# Patient Record
Sex: MALE | Race: ASIAN | NOT HISPANIC OR LATINO
[De-identification: names, ages, dates, MRNs, and addresses within clinical notes are randomized per-mention and may not be internally consistent; named-entity substitution may affect disease eponyms.]

---

## 2017-01-03 ENCOUNTER — RX RENEWAL (OUTPATIENT)
Age: 62
End: 2017-01-03

## 2017-03-10 ENCOUNTER — APPOINTMENT (OUTPATIENT)
Dept: CARDIOLOGY | Facility: CLINIC | Age: 62
End: 2017-03-10

## 2017-03-10 ENCOUNTER — NON-APPOINTMENT (OUTPATIENT)
Age: 62
End: 2017-03-10

## 2017-03-10 VITALS
HEIGHT: 66 IN | WEIGHT: 146 LBS | HEART RATE: 91 BPM | SYSTOLIC BLOOD PRESSURE: 113 MMHG | BODY MASS INDEX: 23.46 KG/M2 | DIASTOLIC BLOOD PRESSURE: 74 MMHG | OXYGEN SATURATION: 97 %

## 2017-03-10 DIAGNOSIS — R07.9 CHEST PAIN, UNSPECIFIED: ICD-10-CM

## 2017-03-25 ENCOUNTER — APPOINTMENT (OUTPATIENT)
Dept: CARDIOLOGY | Facility: CLINIC | Age: 62
End: 2017-03-25

## 2017-04-05 ENCOUNTER — APPOINTMENT (OUTPATIENT)
Dept: CARDIOLOGY | Facility: CLINIC | Age: 62
End: 2017-04-05

## 2017-08-09 ENCOUNTER — RX RENEWAL (OUTPATIENT)
Age: 62
End: 2017-08-09

## 2017-09-06 ENCOUNTER — APPOINTMENT (OUTPATIENT)
Dept: CARDIOLOGY | Facility: CLINIC | Age: 62
End: 2017-09-06
Payer: COMMERCIAL

## 2017-09-06 VITALS
SYSTOLIC BLOOD PRESSURE: 116 MMHG | DIASTOLIC BLOOD PRESSURE: 73 MMHG | HEART RATE: 73 BPM | HEIGHT: 66 IN | WEIGHT: 144 LBS | OXYGEN SATURATION: 95 % | BODY MASS INDEX: 23.14 KG/M2

## 2017-09-06 PROCEDURE — 99214 OFFICE O/P EST MOD 30 MIN: CPT

## 2017-12-12 ENCOUNTER — MEDICATION RENEWAL (OUTPATIENT)
Age: 62
End: 2017-12-12

## 2018-01-09 ENCOUNTER — MEDICATION RENEWAL (OUTPATIENT)
Age: 63
End: 2018-01-09

## 2018-01-16 ENCOUNTER — MEDICATION RENEWAL (OUTPATIENT)
Age: 63
End: 2018-01-16

## 2018-03-12 ENCOUNTER — APPOINTMENT (OUTPATIENT)
Dept: CARDIOLOGY | Facility: CLINIC | Age: 63
End: 2018-03-12
Payer: COMMERCIAL

## 2018-03-12 ENCOUNTER — NON-APPOINTMENT (OUTPATIENT)
Age: 63
End: 2018-03-12

## 2018-03-12 VITALS
DIASTOLIC BLOOD PRESSURE: 72 MMHG | OXYGEN SATURATION: 98 % | HEART RATE: 66 BPM | SYSTOLIC BLOOD PRESSURE: 116 MMHG | BODY MASS INDEX: 22.5 KG/M2 | WEIGHT: 140 LBS | HEIGHT: 66 IN

## 2018-03-12 PROCEDURE — 93000 ELECTROCARDIOGRAM COMPLETE: CPT

## 2018-03-12 PROCEDURE — 99214 OFFICE O/P EST MOD 30 MIN: CPT

## 2018-09-10 ENCOUNTER — APPOINTMENT (OUTPATIENT)
Dept: CARDIOLOGY | Facility: CLINIC | Age: 63
End: 2018-09-10
Payer: COMMERCIAL

## 2018-09-10 VITALS
HEIGHT: 66 IN | SYSTOLIC BLOOD PRESSURE: 116 MMHG | HEART RATE: 74 BPM | OXYGEN SATURATION: 99 % | BODY MASS INDEX: 22.82 KG/M2 | WEIGHT: 142 LBS | DIASTOLIC BLOOD PRESSURE: 72 MMHG

## 2018-09-10 PROCEDURE — 99214 OFFICE O/P EST MOD 30 MIN: CPT

## 2018-12-19 ENCOUNTER — RX RENEWAL (OUTPATIENT)
Age: 63
End: 2018-12-19

## 2018-12-29 ENCOUNTER — APPOINTMENT (OUTPATIENT)
Dept: CARDIOLOGY | Facility: CLINIC | Age: 63
End: 2018-12-29
Payer: COMMERCIAL

## 2018-12-29 PROCEDURE — 93880 EXTRACRANIAL BILAT STUDY: CPT

## 2019-01-21 ENCOUNTER — RX RENEWAL (OUTPATIENT)
Age: 64
End: 2019-01-21

## 2019-03-11 ENCOUNTER — APPOINTMENT (OUTPATIENT)
Dept: CARDIOLOGY | Facility: CLINIC | Age: 64
End: 2019-03-11
Payer: COMMERCIAL

## 2019-03-11 ENCOUNTER — NON-APPOINTMENT (OUTPATIENT)
Age: 64
End: 2019-03-11

## 2019-03-11 VITALS
BODY MASS INDEX: 23.3 KG/M2 | HEART RATE: 68 BPM | OXYGEN SATURATION: 98 % | HEIGHT: 66 IN | WEIGHT: 145 LBS | DIASTOLIC BLOOD PRESSURE: 75 MMHG | SYSTOLIC BLOOD PRESSURE: 120 MMHG

## 2019-03-11 PROCEDURE — 99214 OFFICE O/P EST MOD 30 MIN: CPT

## 2019-03-11 PROCEDURE — 93000 ELECTROCARDIOGRAM COMPLETE: CPT

## 2019-03-11 NOTE — PHYSICAL EXAM
[General Appearance - Well Developed] : well developed [Normal Appearance] : normal appearance [Well Groomed] : well groomed [General Appearance - Well Nourished] : well nourished [No Deformities] : no deformities [General Appearance - In No Acute Distress] : no acute distress [Normal Conjunctiva] : the conjunctiva exhibited no abnormalities [Normal Oral Mucosa] : normal oral mucosa [Normal Jugular Venous A Waves Present] : normal jugular venous A waves present [Normal Jugular Venous V Waves Present] : normal jugular venous V waves present [No Jugular Venous Dyson A Waves] : no jugular venous dyson A waves [Respiration, Rhythm And Depth] : normal respiratory rhythm and effort [Exaggerated Use Of Accessory Muscles For Inspiration] : no accessory muscle use [Auscultation Breath Sounds / Voice Sounds] : lungs were clear to auscultation bilaterally [Bowel Sounds] : normal bowel sounds [Abdomen Soft] : soft [Abdomen Tenderness] : non-tender [Abnormal Walk] : normal gait [Gait - Sufficient For Exercise Testing] : the gait was sufficient for exercise testing [Nail Clubbing] : no clubbing of the fingernails [Cyanosis, Localized] : no localized cyanosis [Skin Color & Pigmentation] : normal skin color and pigmentation [Skin Turgor] : normal skin turgor [] : no rash [Oriented To Time, Place, And Person] : oriented to person, place, and time [Impaired Insight] : insight and judgment were intact [No Anxiety] : not feeling anxious [Normal Rate] : normal [Normal S1] : normal S1 [Normal S2] : normal S2 [No Murmur] : no murmurs heard [2+] : left 2+ [1+] : left 1+ [No Abnormalities] : the abdominal aorta was not enlarged and no bruit was heard [No Pitting Edema] : no pitting edema present [S3] : no S3 [S4] : no S4 [Right Carotid Bruit] : no bruit heard over the right carotid [Left Carotid Bruit] : no bruit heard over the left carotid [Right Femoral Bruit] : no bruit heard over the right femoral artery [Left Femoral Bruit] : no bruit heard over the left femoral artery

## 2019-03-11 NOTE — DISCUSSION/SUMMARY
[FreeTextEntry1] : The patient is doing well without any signs or symptoms of active heart disease. Is on a good healthy diet, and exercises regularly and has good weight control.\par \par I would like to review recent blood work from your office. His last LDL was 75 which is a little high. We did discuss increasing Lipitor to 80 mg. He is already having some muscle toxicity. He will start taking CoQ10.\par \par He will stay on his regular medications and if all is well I will see him in 6 months.

## 2019-03-11 NOTE — REVIEW OF SYSTEMS
[Blurry Vision] : blurred vision [Heartburn] : heartburn [Dizziness] : dizziness [Negative] : Genitourinary [Fever] : no fever [Headache] : no headache [Chills] : no chills [Feeling Fatigued] : not feeling fatigued [Eyeglasses] : not currently wearing eyeglasses [Earache] : no earache [Sore Throat] : no sore throat [Sinus Pressure] : no sinus pressure [Joint Pain] : no joint pain [Skin: A Rash] : no rash: [Skin Lesions] : no skin lesions [Tremor] : no tremor was seen [Convulsions] : no convulsions [Excessive Thirst] : no polydipsia [Easy Bleeding] : no tendency for easy bleeding [Easy Bruising] : no tendency for easy bruising

## 2019-03-11 NOTE — HISTORY OF PRESENT ILLNESS
[FreeTextEntry1] : I saw Aren Ivory in the office today for a follow up visit. He is a 63-year-old male who underwent cardiac catheterization following an abnormal stress test in June 2012. He had a 99% LAD lesion that was treated with a drug-eluting stent. The other arteries were normal. Left ventricular end-diastolic pressure was 25. Also has diabetes and high cholesterol. He is exercising regularly and has no symptoms however he does get slightly lightheaded during exercise. His metoprolol was decreased from 25mg to 12.5 mg a day. He also is getting heartburn and saw his Gastroenterologist. This was well controlled with ranitidine.\par \par Blood work performed 7/18. A1c was 6.4. Cholesterol 126, triglycerides 62, HDL 39, and LDL 75. Chemistry and CBC were unremarkable.\par \par  He had a carotid Doppler in December 12/18 that showed mild plaque. Repeat study 7/15 demonstrated a mild plaque.\par \par The patient underwent a nuclear stress test 6/14 that showed no ischemia and a workload of 11 mets.\par \par The patient in the past has been intolerant to even very small doses of ACE inhibitor because of dizziness. I have no evidence that the small dose of metoprolol is effective and recent studies indicate that people with stable coronary disease do not need to be on a beta blocker. last visit we tried stopping the beta blocker and trying a small dose ramipril. He was intolerant to this.\par \par  He went for a regular stress test 4/17 which showed no ischemia at workload of 12 Mets. Echocardiogram was unremarkable.\par \par Resting electrocardiogram demonstrates sinus rhythm and appears to be normal.\par

## 2019-03-11 NOTE — REASON FOR VISIT
[Follow-Up - Clinic] : a clinic follow-up of [Chest Pain] : chest pain [Coronary Artery Disease] : coronary artery disease [Hyperlipidemia] : hyperlipidemia [FreeTextEntry1] : Diabetes

## 2019-06-12 ENCOUNTER — RX RENEWAL (OUTPATIENT)
Age: 64
End: 2019-06-12

## 2019-06-21 ENCOUNTER — MEDICATION RENEWAL (OUTPATIENT)
Age: 64
End: 2019-06-21

## 2019-07-19 ENCOUNTER — APPOINTMENT (OUTPATIENT)
Dept: INTERNAL MEDICINE | Facility: CLINIC | Age: 64
End: 2019-07-19
Payer: COMMERCIAL

## 2019-07-19 VITALS
TEMPERATURE: 98.8 F | HEART RATE: 80 BPM | SYSTOLIC BLOOD PRESSURE: 117 MMHG | WEIGHT: 141 LBS | OXYGEN SATURATION: 99 % | HEIGHT: 66 IN | BODY MASS INDEX: 22.66 KG/M2 | DIASTOLIC BLOOD PRESSURE: 68 MMHG

## 2019-07-19 PROCEDURE — 99213 OFFICE O/P EST LOW 20 MIN: CPT

## 2019-07-19 NOTE — HISTORY OF PRESENT ILLNESS
[FreeTextEntry8] : Pt comes for back pain after traveling to [place] and sleeping on a small bed  Pain is in llow back and radiates around to rt thigh

## 2019-07-19 NOTE — REVIEW OF SYSTEMS
[Back Pain] : back pain [Fever] : no fever [Chills] : no chills [Chest Pain] : no chest pain [Shortness Of Breath] : no shortness of breath [Wheezing] : no wheezing

## 2019-07-19 NOTE — PHYSICAL EXAM
[No Acute Distress] : no acute distress [Normal Sclera/Conjunctiva] : normal sclera/conjunctiva [Normal Outer Ear/Nose] : the outer ears and nose were normal in appearance [No JVD] : no jugular venous distention [No Respiratory Distress] : no respiratory distress  [No Accessory Muscle Use] : no accessory muscle use [Normal Rate] : normal rate  [Regular Rhythm] : with a regular rhythm [No Edema] : there was no peripheral edema [Soft] : abdomen soft [Non Tender] : non-tender [Non-distended] : non-distended [de-identified] : neg straight leg raise  mild discomfort twisting and sitting up

## 2019-09-07 ENCOUNTER — APPOINTMENT (OUTPATIENT)
Dept: INTERNAL MEDICINE | Facility: CLINIC | Age: 64
End: 2019-09-07
Payer: COMMERCIAL

## 2019-09-07 VITALS
DIASTOLIC BLOOD PRESSURE: 77 MMHG | OXYGEN SATURATION: 99 % | HEART RATE: 71 BPM | HEIGHT: 66 IN | SYSTOLIC BLOOD PRESSURE: 122 MMHG | WEIGHT: 141 LBS | BODY MASS INDEX: 22.66 KG/M2 | TEMPERATURE: 98 F

## 2019-09-07 PROCEDURE — 36415 COLL VENOUS BLD VENIPUNCTURE: CPT

## 2019-09-07 PROCEDURE — 99213 OFFICE O/P EST LOW 20 MIN: CPT | Mod: 25

## 2019-09-07 NOTE — PHYSICAL EXAM
[No Acute Distress] : no acute distress [Normal Outer Ear/Nose] : the outer ears and nose were normal in appearance [No JVD] : no jugular venous distention [No Lymphadenopathy] : no lymphadenopathy [Supple] : supple [No Accessory Muscle Use] : no accessory muscle use [No Respiratory Distress] : no respiratory distress  [Clear to Auscultation] : lungs were clear to auscultation bilaterally [Normal Rate] : normal rate  [Regular Rhythm] : with a regular rhythm [No Edema] : there was no peripheral edema [No Extremity Clubbing/Cyanosis] : no extremity clubbing/cyanosis [Soft] : abdomen soft [Non Tender] : non-tender [Non-distended] : non-distended [No Joint Swelling] : no joint swelling [Grossly Normal Strength/Tone] : grossly normal strength/tone [de-identified] : rt hand wnl

## 2019-09-07 NOTE — REVIEW OF SYSTEMS
[Joint Pain] : joint pain [Joint Stiffness] : joint stiffness [Fever] : no fever [Chills] : no chills [Chest Pain] : no chest pain [Palpitations] : no palpitations [Lower Ext Edema] : no lower extremity edema [Shortness Of Breath] : no shortness of breath [Wheezing] : no wheezing [Cough] : no cough [Abdominal Pain] : no abdominal pain [Joint Swelling] : no joint swelling

## 2019-09-09 LAB
ALBUMIN SERPL ELPH-MCNC: 5 G/DL
ALP BLD-CCNC: 65 U/L
ALT SERPL-CCNC: 22 U/L
ANION GAP SERPL CALC-SCNC: 12 MMOL/L
AST SERPL-CCNC: 22 U/L
BASOPHILS # BLD AUTO: 0.03 K/UL
BASOPHILS NFR BLD AUTO: 0.6 %
BILIRUB SERPL-MCNC: 1 MG/DL
BUN SERPL-MCNC: 29 MG/DL
CALCIUM SERPL-MCNC: 9.8 MG/DL
CHLORIDE SERPL-SCNC: 102 MMOL/L
CHOLEST SERPL-MCNC: 137 MG/DL
CHOLEST/HDLC SERPL: 3 RATIO
CO2 SERPL-SCNC: 26 MMOL/L
CREAT SERPL-MCNC: 1.11 MG/DL
EOSINOPHIL # BLD AUTO: 0.19 K/UL
EOSINOPHIL NFR BLD AUTO: 3.8 %
ESTIMATED AVERAGE GLUCOSE: 140 MG/DL
GLUCOSE SERPL-MCNC: 122 MG/DL
HBA1C MFR BLD HPLC: 6.5 %
HCT VFR BLD CALC: 49 %
HDLC SERPL-MCNC: 46 MG/DL
HGB BLD-MCNC: 15 G/DL
IMM GRANULOCYTES NFR BLD AUTO: 0.2 %
LDLC SERPL CALC-MCNC: 77 MG/DL
LYMPHOCYTES # BLD AUTO: 1.17 K/UL
LYMPHOCYTES NFR BLD AUTO: 23.5 %
MAN DIFF?: NORMAL
MCHC RBC-ENTMCNC: 30.4 PG
MCHC RBC-ENTMCNC: 30.6 GM/DL
MCV RBC AUTO: 99.4 FL
MONOCYTES # BLD AUTO: 0.39 K/UL
MONOCYTES NFR BLD AUTO: 7.8 %
NEUTROPHILS # BLD AUTO: 3.19 K/UL
NEUTROPHILS NFR BLD AUTO: 64.1 %
PLATELET # BLD AUTO: 219 K/UL
POTASSIUM SERPL-SCNC: 5 MMOL/L
PROT SERPL-MCNC: 7.6 G/DL
RBC # BLD: 4.93 M/UL
RBC # FLD: 12.6 %
SODIUM SERPL-SCNC: 140 MMOL/L
TRIGL SERPL-MCNC: 71 MG/DL
TSH SERPL-ACNC: 0.98 UIU/ML
WBC # FLD AUTO: 4.98 K/UL

## 2019-09-11 ENCOUNTER — TRANSCRIPTION ENCOUNTER (OUTPATIENT)
Age: 64
End: 2019-09-11

## 2019-09-11 ENCOUNTER — APPOINTMENT (OUTPATIENT)
Dept: CARDIOLOGY | Facility: CLINIC | Age: 64
End: 2019-09-11
Payer: COMMERCIAL

## 2019-09-11 VITALS
HEIGHT: 66 IN | SYSTOLIC BLOOD PRESSURE: 104 MMHG | HEART RATE: 75 BPM | OXYGEN SATURATION: 99 % | BODY MASS INDEX: 23.3 KG/M2 | DIASTOLIC BLOOD PRESSURE: 66 MMHG | WEIGHT: 145 LBS

## 2019-09-11 PROCEDURE — 99214 OFFICE O/P EST MOD 30 MIN: CPT

## 2019-09-11 NOTE — HISTORY OF PRESENT ILLNESS
[FreeTextEntry1] : I saw Aren Ivory in the office today for a follow up visit. He is a 63-year-old male who underwent cardiac catheterization following an abnormal stress test in June 2012. He had a 99% LAD lesion that was treated with a drug-eluting stent. The other arteries were normal. Left ventricular end-diastolic pressure was 25. Also has diabetes and high cholesterol. He is exercising regularly and has no symptoms however he does get slightly lightheaded during exercise. His metoprolol was decreased from 25mg to 12.5 mg a day. He also is getting heartburn and saw his Gastroenterologist. This was well controlled with ranitidine.\par \par Blood work performed in 9/19 demonstrates a cholesterol 137, triglycerides 77, HDL 46, and LDL 77. A1c was 6.5.. Chemistry and CBC were unremarkable.\par \par  He had a carotid Doppler in December 12/18 that showed mild plaque. Repeat study 7/15 demonstrated a mild plaque.\par \par The patient underwent a nuclear stress test 6/14 that showed no ischemia and a workload of 11 mets.\par \par The patient in the past has been intolerant to even very small doses of ACE inhibitor because of dizziness. I have no evidence that the small dose of metoprolol is effective and recent studies indicate that people with stable coronary disease do not need to be on a beta blocker. last visit we tried stopping the beta blocker and trying a small dose ramipril. He was intolerant to this.\par \par  He went for a regular stress test 4/17 which showed no ischemia at workload of 12 Mets. Echocardiogram was unremarkable.\par

## 2019-09-11 NOTE — DISCUSSION/SUMMARY
[FreeTextEntry1] : The patient is doing well without any signs or symptoms of active heart disease. On his medications his cholesterol and glucose are well controlled and blood pressure is stable. He exercises regularly and has no chest pain or shortness of breath or palpitation.\par \par We went over his recent bloodwork which is excellent. He will stay on his present medication. If he has any symptoms or problems he will call me. If all is well I will see him in 6 months

## 2019-10-04 ENCOUNTER — APPOINTMENT (OUTPATIENT)
Dept: INTERNAL MEDICINE | Facility: CLINIC | Age: 64
End: 2019-10-04
Payer: COMMERCIAL

## 2019-10-04 VITALS
WEIGHT: 143 LBS | SYSTOLIC BLOOD PRESSURE: 114 MMHG | DIASTOLIC BLOOD PRESSURE: 70 MMHG | OXYGEN SATURATION: 99 % | TEMPERATURE: 98.2 F | HEART RATE: 88 BPM | HEIGHT: 66 IN | BODY MASS INDEX: 22.98 KG/M2

## 2019-10-04 PROCEDURE — 90471 IMMUNIZATION ADMIN: CPT

## 2019-10-04 PROCEDURE — 90686 IIV4 VACC NO PRSV 0.5 ML IM: CPT

## 2019-10-19 ENCOUNTER — EMERGENCY (EMERGENCY)
Facility: HOSPITAL | Age: 64
LOS: 1 days | Discharge: HOME | End: 2019-10-19
Attending: EMERGENCY MEDICINE | Admitting: EMERGENCY MEDICINE
Payer: COMMERCIAL

## 2019-10-19 ENCOUNTER — APPOINTMENT (OUTPATIENT)
Dept: INTERNAL MEDICINE | Facility: CLINIC | Age: 64
End: 2019-10-19
Payer: COMMERCIAL

## 2019-10-19 VITALS
HEIGHT: 66 IN | RESPIRATION RATE: 19 BRPM | DIASTOLIC BLOOD PRESSURE: 52 MMHG | HEART RATE: 98 BPM | TEMPERATURE: 101 F | OXYGEN SATURATION: 98 % | WEIGHT: 139.99 LBS | SYSTOLIC BLOOD PRESSURE: 110 MMHG

## 2019-10-19 VITALS
TEMPERATURE: 99 F | HEART RATE: 86 BPM | RESPIRATION RATE: 20 BRPM | DIASTOLIC BLOOD PRESSURE: 54 MMHG | SYSTOLIC BLOOD PRESSURE: 100 MMHG | OXYGEN SATURATION: 98 %

## 2019-10-19 VITALS — WEIGHT: 140 LBS | BODY MASS INDEX: 22.5 KG/M2 | HEIGHT: 66 IN | TEMPERATURE: 100.6 F

## 2019-10-19 VITALS — DIASTOLIC BLOOD PRESSURE: 70 MMHG | SYSTOLIC BLOOD PRESSURE: 114 MMHG

## 2019-10-19 DIAGNOSIS — Z98.89 OTHER SPECIFIED POSTPROCEDURAL STATES: Chronic | ICD-10-CM

## 2019-10-19 LAB
ALBUMIN SERPL ELPH-MCNC: 3.6 G/DL — SIGNIFICANT CHANGE UP (ref 3.3–5)
ALP SERPL-CCNC: 68 U/L — SIGNIFICANT CHANGE UP (ref 30–120)
ALT FLD-CCNC: 24 U/L DA — SIGNIFICANT CHANGE UP (ref 10–60)
ANION GAP SERPL CALC-SCNC: 9 MMOL/L — SIGNIFICANT CHANGE UP (ref 5–17)
APPEARANCE UR: CLEAR — SIGNIFICANT CHANGE UP
APTT BLD: 30.9 SEC — SIGNIFICANT CHANGE UP (ref 28.5–37)
AST SERPL-CCNC: 16 U/L — SIGNIFICANT CHANGE UP (ref 10–40)
BASOPHILS # BLD AUTO: 0.03 K/UL — SIGNIFICANT CHANGE UP (ref 0–0.2)
BASOPHILS NFR BLD AUTO: 0.2 % — SIGNIFICANT CHANGE UP (ref 0–2)
BILIRUB SERPL-MCNC: 2.4 MG/DL — HIGH (ref 0.2–1.2)
BILIRUB UR-MCNC: NEGATIVE — SIGNIFICANT CHANGE UP
BUN SERPL-MCNC: 15 MG/DL — SIGNIFICANT CHANGE UP (ref 7–23)
CALCIUM SERPL-MCNC: 8.8 MG/DL — SIGNIFICANT CHANGE UP (ref 8.4–10.5)
CHLORIDE SERPL-SCNC: 98 MMOL/L — SIGNIFICANT CHANGE UP (ref 96–108)
CO2 SERPL-SCNC: 27 MMOL/L — SIGNIFICANT CHANGE UP (ref 22–31)
COLOR SPEC: YELLOW — SIGNIFICANT CHANGE UP
CREAT SERPL-MCNC: 1.23 MG/DL — SIGNIFICANT CHANGE UP (ref 0.5–1.3)
DIFF PNL FLD: ABNORMAL
EOSINOPHIL # BLD AUTO: 0.01 K/UL — SIGNIFICANT CHANGE UP (ref 0–0.5)
EOSINOPHIL NFR BLD AUTO: 0.1 % — SIGNIFICANT CHANGE UP (ref 0–6)
GLUCOSE SERPL-MCNC: 148 MG/DL — HIGH (ref 70–99)
GLUCOSE UR QL: NEGATIVE MG/DL — SIGNIFICANT CHANGE UP
HCT VFR BLD CALC: 40.6 % — SIGNIFICANT CHANGE UP (ref 39–50)
HGB BLD-MCNC: 13.3 G/DL — SIGNIFICANT CHANGE UP (ref 13–17)
IMM GRANULOCYTES NFR BLD AUTO: 0.7 % — SIGNIFICANT CHANGE UP (ref 0–1.5)
INR BLD: 1.42 RATIO — HIGH (ref 0.88–1.16)
KETONES UR-MCNC: NEGATIVE — SIGNIFICANT CHANGE UP
LACTATE SERPL-SCNC: 1.8 MMOL/L — SIGNIFICANT CHANGE UP (ref 0.7–2)
LEUKOCYTE ESTERASE UR-ACNC: ABNORMAL
LYMPHOCYTES # BLD AUTO: 0.84 K/UL — LOW (ref 1–3.3)
LYMPHOCYTES # BLD AUTO: 5.7 % — LOW (ref 13–44)
MCHC RBC-ENTMCNC: 30.2 PG — SIGNIFICANT CHANGE UP (ref 27–34)
MCHC RBC-ENTMCNC: 32.8 GM/DL — SIGNIFICANT CHANGE UP (ref 32–36)
MCV RBC AUTO: 92.3 FL — SIGNIFICANT CHANGE UP (ref 80–100)
MONOCYTES # BLD AUTO: 1.22 K/UL — HIGH (ref 0–0.9)
MONOCYTES NFR BLD AUTO: 8.3 % — SIGNIFICANT CHANGE UP (ref 2–14)
NEUTROPHILS # BLD AUTO: 12.52 K/UL — HIGH (ref 1.8–7.4)
NEUTROPHILS NFR BLD AUTO: 85 % — HIGH (ref 43–77)
NITRITE UR-MCNC: POSITIVE
NRBC # BLD: 0 /100 WBCS — SIGNIFICANT CHANGE UP (ref 0–0)
PH UR: 6.5 — SIGNIFICANT CHANGE UP (ref 5–8)
PLATELET # BLD AUTO: 147 K/UL — LOW (ref 150–400)
POTASSIUM SERPL-MCNC: 3.8 MMOL/L — SIGNIFICANT CHANGE UP (ref 3.5–5.3)
POTASSIUM SERPL-SCNC: 3.8 MMOL/L — SIGNIFICANT CHANGE UP (ref 3.5–5.3)
PROT SERPL-MCNC: 7.2 G/DL — SIGNIFICANT CHANGE UP (ref 6–8.3)
PROT UR-MCNC: 30 MG/DL
PROTHROM AB SERPL-ACNC: 15.7 SEC — HIGH (ref 10–12.9)
RBC # BLD: 4.4 M/UL — SIGNIFICANT CHANGE UP (ref 4.2–5.8)
RBC # FLD: 12 % — SIGNIFICANT CHANGE UP (ref 10.3–14.5)
SODIUM SERPL-SCNC: 134 MMOL/L — LOW (ref 135–145)
SP GR SPEC: 1.01 — SIGNIFICANT CHANGE UP (ref 1.01–1.02)
UROBILINOGEN FLD QL: NEGATIVE MG/DL — SIGNIFICANT CHANGE UP
WBC # BLD: 14.73 K/UL — HIGH (ref 3.8–10.5)
WBC # FLD AUTO: 14.73 K/UL — HIGH (ref 3.8–10.5)

## 2019-10-19 PROCEDURE — 99284 EMERGENCY DEPT VISIT MOD MDM: CPT | Mod: 25

## 2019-10-19 PROCEDURE — 99213 OFFICE O/P EST LOW 20 MIN: CPT | Mod: 25

## 2019-10-19 PROCEDURE — 87040 BLOOD CULTURE FOR BACTERIA: CPT

## 2019-10-19 PROCEDURE — 93010 ELECTROCARDIOGRAM REPORT: CPT

## 2019-10-19 PROCEDURE — 71046 X-RAY EXAM CHEST 2 VIEWS: CPT

## 2019-10-19 PROCEDURE — 87086 URINE CULTURE/COLONY COUNT: CPT

## 2019-10-19 PROCEDURE — 74176 CT ABD & PELVIS W/O CONTRAST: CPT

## 2019-10-19 PROCEDURE — 83605 ASSAY OF LACTIC ACID: CPT

## 2019-10-19 PROCEDURE — 71046 X-RAY EXAM CHEST 2 VIEWS: CPT | Mod: 26

## 2019-10-19 PROCEDURE — 85610 PROTHROMBIN TIME: CPT

## 2019-10-19 PROCEDURE — 81001 URINALYSIS AUTO W/SCOPE: CPT

## 2019-10-19 PROCEDURE — 36415 COLL VENOUS BLD VENIPUNCTURE: CPT

## 2019-10-19 PROCEDURE — 87186 SC STD MICRODIL/AGAR DIL: CPT

## 2019-10-19 PROCEDURE — 85730 THROMBOPLASTIN TIME PARTIAL: CPT

## 2019-10-19 PROCEDURE — 74176 CT ABD & PELVIS W/O CONTRAST: CPT | Mod: 26

## 2019-10-19 PROCEDURE — 81003 URINALYSIS AUTO W/O SCOPE: CPT | Mod: QW

## 2019-10-19 PROCEDURE — 93005 ELECTROCARDIOGRAM TRACING: CPT

## 2019-10-19 PROCEDURE — 99284 EMERGENCY DEPT VISIT MOD MDM: CPT

## 2019-10-19 PROCEDURE — 96361 HYDRATE IV INFUSION ADD-ON: CPT

## 2019-10-19 PROCEDURE — 96365 THER/PROPH/DIAG IV INF INIT: CPT

## 2019-10-19 PROCEDURE — 85027 COMPLETE CBC AUTOMATED: CPT

## 2019-10-19 PROCEDURE — 80053 COMPREHEN METABOLIC PANEL: CPT

## 2019-10-19 RX ORDER — CEFUROXIME AXETIL 250 MG
1 TABLET ORAL
Qty: 20 | Refills: 0
Start: 2019-10-19 | End: 2019-10-28

## 2019-10-19 RX ORDER — ACETAMINOPHEN 500 MG
650 TABLET ORAL ONCE
Refills: 0 | Status: COMPLETED | OUTPATIENT
Start: 2019-10-19 | End: 2019-10-19

## 2019-10-19 RX ORDER — SODIUM CHLORIDE 9 MG/ML
1950 INJECTION INTRAMUSCULAR; INTRAVENOUS; SUBCUTANEOUS ONCE
Refills: 0 | Status: COMPLETED | OUTPATIENT
Start: 2019-10-19 | End: 2019-10-19

## 2019-10-19 RX ORDER — CEFTRIAXONE 500 MG/1
1000 INJECTION, POWDER, FOR SOLUTION INTRAMUSCULAR; INTRAVENOUS ONCE
Refills: 0 | Status: COMPLETED | OUTPATIENT
Start: 2019-10-19 | End: 2019-10-19

## 2019-10-19 RX ADMIN — SODIUM CHLORIDE 1950 MILLILITER(S): 9 INJECTION INTRAMUSCULAR; INTRAVENOUS; SUBCUTANEOUS at 14:25

## 2019-10-19 RX ADMIN — Medication 650 MILLIGRAM(S): at 12:53

## 2019-10-19 RX ADMIN — CEFTRIAXONE 100 MILLIGRAM(S): 500 INJECTION, POWDER, FOR SOLUTION INTRAMUSCULAR; INTRAVENOUS at 12:54

## 2019-10-19 RX ADMIN — Medication 650 MILLIGRAM(S): at 12:59

## 2019-10-19 RX ADMIN — CEFTRIAXONE 1000 MILLIGRAM(S): 500 INJECTION, POWDER, FOR SOLUTION INTRAMUSCULAR; INTRAVENOUS at 13:24

## 2019-10-19 RX ADMIN — SODIUM CHLORIDE 1950 MILLILITER(S): 9 INJECTION INTRAMUSCULAR; INTRAVENOUS; SUBCUTANEOUS at 12:54

## 2019-10-19 NOTE — ED PROVIDER NOTE - PMH
CAD (coronary artery disease)    Diabetes mellitus type II    GERD (gastroesophageal reflux disease)    H/O hyperlipidemia    History of seasonal allergies

## 2019-10-19 NOTE — PHYSICAL EXAM
[No Acute Distress] : no acute distress [Normal Sclera/Conjunctiva] : normal sclera/conjunctiva [No JVD] : no jugular venous distention [Normal Outer Ear/Nose] : the outer ears and nose were normal in appearance [No Lymphadenopathy] : no lymphadenopathy [Normal Rate] : normal rate  [No Accessory Muscle Use] : no accessory muscle use [No Respiratory Distress] : no respiratory distress  [Regular Rhythm] : with a regular rhythm [No Edema] : there was no peripheral edema [No Extremity Clubbing/Cyanosis] : no extremity clubbing/cyanosis [Soft] : abdomen soft [Non-distended] : non-distended [Non Tender] : non-tender

## 2019-10-19 NOTE — ED PROVIDER NOTE - CARE PROVIDER_API CALL
Homero uNgent)  Urology  [address], [address]  [place] [address]  Phone: [phone]  Fax: [phone]  Follow Up Time: 1-3 Days    Ab Belle)  Urology  [address], [address]  [place] [address]  Phone: [phone]  Fax: [phone]  Follow Up Time: 1-3 Days    Hector Delaney)  Internal Medicine  [address] [place] [address]  Phone: [phone]  Fax: [phone]  Follow Up Time: 1-3 Days

## 2019-10-19 NOTE — ED PROVIDER NOTE - PROVIDER TOKENS
PROVIDER:[TOKEN:[[id number]],FOLLOWUP:[1-3 Days]],PROVIDER:[TOKEN:[[id number]],FOLLOWUP:[1-3 Days]],PROVIDER:[TOKEN:[[id number]],FOLLOWUP:[1-3 Days]]

## 2019-10-19 NOTE — ED PROVIDER NOTE - PROGRESS NOTE DETAILS
Rizwan SAMS for ED attending, Dr. Crenshaw : 65 y/o male sent by Dr. Delaney for evaluation of fever and suprapubic pain and difficulty urinating x several days, denies n/v , cough, SOB, diarrhea, hematuria. Had UA done in office which revealed blood and leukocyte esterace in urine today, denies hx of prostate enlargement or prior UTI  PE: temp 100.9, NAD, heart and lungs nl, abd soft, no suprapubic tenderness, no cvat  Impression:  r/o UTI, plan - labs, UA, and abx pt improved. labs and imaging reviewed. pt with uti. advised prompt urology follow up and return precautions. will rx ceftin. All imaging and labs reviewed. all results reviewed with pt including abnormal results. pt given a copy of results. pt advised to follow up with pmd regarding abnormal results. All questions answered and concerns addressed. pt verbalized understanding and agreement with plan and dx. pt advised on next step and when/where to follow up. pt advised on all take home and otc medications. pt advised to follow up with PMD. pt advised to return to ed for worsenng symptoms including fever, cp, sob. will dc.

## 2019-10-19 NOTE — ED PROVIDER NOTE - PATIENT PORTAL LINK FT
You can access the FollowMyHealth Patient Portal offered by [facility] by registering at the following website: [email or web address]. By joining [facility]’s FollowMyHealth portal, you will also be able to view your health information using other applications (apps) compatible with our system.

## 2019-10-19 NOTE — ED PROVIDER NOTE - NONTENDER LOCATION
right upper quadrant/right costovertebral angle/periumbilical/right lower quadrant/left costovertebral angle/left upper quadrant/left lower quadrant/umbilical

## 2019-10-19 NOTE — ED PROVIDER NOTE - NSFOLLOWUPINSTRUCTIONS_ED_ALL_ED_FT
1. FOLLOW UP WITH YOUR PRIMARY DOCTOR IN 24-48 HOURS.   2. FOLLOW UP WITH ALL SPECIALIST DISCUSSED DURING YOUR VISIT.   3. TAKE ALL MEDICATIONS PRESCRIBED IN THE ER IF ANY ARE PRESCRIBED. CONTINUE YOUR HOME MEDICATIONS UNLESS OTHERWISE ADVISED DIFFERENTLY.   4. RETURN FOR WORSENING SYMPTOMS OR CONCERNS INCLUDING BUT NOT LIMITED TO FEVER, CHEST PAIN, OR TROUBLE BREATHING OR ANY OTHER CONCERNS   5. take ceftin daily as prescribed until finished'  6. take tylenol 650mg every 6 hours as needed for fever. can take ibuprofen 600mg every 6 hours if needed.

## 2019-10-19 NOTE — REVIEW OF SYSTEMS
[Fever] : fever [Fatigue] : fatigue [Chills] : chills [Dysuria] : dysuria [Frequency] : frequency [Shortness Of Breath] : no shortness of breath [Wheezing] : no wheezing [Cough] : no cough [Abdominal Pain] : no abdominal pain

## 2019-10-19 NOTE — ED PROVIDER NOTE - CARE PROVIDERS DIRECT ADDRESSES
[email or web address],DirectAddress_Unknown

## 2019-10-19 NOTE — ED PROVIDER NOTE - OBJECTIVE STATEMENT
pt is a 63yo male with pmhx of cad, dm on metformin, hld, gerd presents with dysuria x days. pt reports urinary frequency with retention and suprapubic discomfort radiating to his back. pt reports associated fever 103.0 tmax tx with ibuprofen. pt went to pmd, dr sal, who advised pt to come to ed for eval.

## 2019-10-21 LAB
-  AMIKACIN: SIGNIFICANT CHANGE UP
-  AMPICILLIN/SULBACTAM: SIGNIFICANT CHANGE UP
-  AMPICILLIN: SIGNIFICANT CHANGE UP
-  AZTREONAM: SIGNIFICANT CHANGE UP
-  CEFAZOLIN: SIGNIFICANT CHANGE UP
-  CEFEPIME: SIGNIFICANT CHANGE UP
-  CEFOXITIN: SIGNIFICANT CHANGE UP
-  CEFTRIAXONE: SIGNIFICANT CHANGE UP
-  CIPROFLOXACIN: SIGNIFICANT CHANGE UP
-  GENTAMICIN: SIGNIFICANT CHANGE UP
-  IMIPENEM: SIGNIFICANT CHANGE UP
-  LEVOFLOXACIN: SIGNIFICANT CHANGE UP
-  MEROPENEM: SIGNIFICANT CHANGE UP
-  NITROFURANTOIN: SIGNIFICANT CHANGE UP
-  PIPERACILLIN/TAZOBACTAM: SIGNIFICANT CHANGE UP
-  TIGECYCLINE: SIGNIFICANT CHANGE UP
-  TOBRAMYCIN: SIGNIFICANT CHANGE UP
-  TRIMETHOPRIM/SULFAMETHOXAZOLE: SIGNIFICANT CHANGE UP
BILIRUB UR QL STRIP: NEGATIVE
CLARITY UR: CLEAR
COLLECTION METHOD: NORMAL
CULTURE RESULTS: SIGNIFICANT CHANGE UP
GLUCOSE UR-MCNC: NEGATIVE
HCG UR QL: 0.2 EU/DL
HGB UR QL STRIP.AUTO: NORMAL
KETONES UR-MCNC: NEGATIVE
LEUKOCYTE ESTERASE UR QL STRIP: NORMAL
METHOD TYPE: SIGNIFICANT CHANGE UP
NITRITE UR QL STRIP: POSITIVE
ORGANISM # SPEC MICROSCOPIC CNT: SIGNIFICANT CHANGE UP
ORGANISM # SPEC MICROSCOPIC CNT: SIGNIFICANT CHANGE UP
PH UR STRIP: 6
PROT UR STRIP-MCNC: 100
SP GR UR STRIP: 1.01
SPECIMEN SOURCE: SIGNIFICANT CHANGE UP

## 2019-10-25 LAB
CULTURE RESULTS: SIGNIFICANT CHANGE UP
CULTURE RESULTS: SIGNIFICANT CHANGE UP
SPECIMEN SOURCE: SIGNIFICANT CHANGE UP
SPECIMEN SOURCE: SIGNIFICANT CHANGE UP

## 2019-10-28 ENCOUNTER — APPOINTMENT (OUTPATIENT)
Dept: INTERNAL MEDICINE | Facility: CLINIC | Age: 64
End: 2019-10-28
Payer: COMMERCIAL

## 2019-10-28 VITALS
OXYGEN SATURATION: 98 % | HEART RATE: 88 BPM | WEIGHT: 140 LBS | BODY MASS INDEX: 22.5 KG/M2 | HEIGHT: 66 IN | TEMPERATURE: 97.9 F

## 2019-10-28 VITALS — DIASTOLIC BLOOD PRESSURE: 70 MMHG | SYSTOLIC BLOOD PRESSURE: 114 MMHG

## 2019-10-28 DIAGNOSIS — Z87.440 PERSONAL HISTORY OF URINARY (TRACT) INFECTIONS: ICD-10-CM

## 2019-10-28 PROCEDURE — 99213 OFFICE O/P EST LOW 20 MIN: CPT | Mod: 25

## 2019-10-28 PROCEDURE — 81003 URINALYSIS AUTO W/O SCOPE: CPT | Mod: QW

## 2019-10-28 PROCEDURE — 36415 COLL VENOUS BLD VENIPUNCTURE: CPT

## 2019-10-28 NOTE — HISTORY OF PRESENT ILLNESS
[de-identified] : Pt is s/p sepsis related to prostate infection  saw Dr French 34 days of ceftin 500mg bid  Feels wlee

## 2019-10-28 NOTE — REVIEW OF SYSTEMS
[Fever] : no fever [Chills] : no chills [Dysuria] : no dysuria [Incontinence] : no incontinence [Hematuria] : no hematuria [Frequency] : no frequency

## 2019-10-29 LAB
ALBUMIN SERPL ELPH-MCNC: 4.3 G/DL
ALP BLD-CCNC: 66 U/L
ALT SERPL-CCNC: 14 U/L
ANION GAP SERPL CALC-SCNC: 13 MMOL/L
AST SERPL-CCNC: 16 U/L
BASOPHILS # BLD AUTO: 0.03 K/UL
BASOPHILS NFR BLD AUTO: 0.6 %
BILIRUB SERPL-MCNC: 0.5 MG/DL
BILIRUB UR QL STRIP: NEGATIVE
BUN SERPL-MCNC: 19 MG/DL
CALCIUM SERPL-MCNC: 9.3 MG/DL
CHLORIDE SERPL-SCNC: 102 MMOL/L
CLARITY UR: CLEAR
CO2 SERPL-SCNC: 27 MMOL/L
COLLECTION METHOD: NORMAL
CREAT SERPL-MCNC: 1.17 MG/DL
EOSINOPHIL # BLD AUTO: 0.07 K/UL
EOSINOPHIL NFR BLD AUTO: 1.4 %
GLUCOSE SERPL-MCNC: 142 MG/DL
GLUCOSE UR-MCNC: NEGATIVE
HCG UR QL: 0.2 EU/DL
HCT VFR BLD CALC: 42.9 %
HGB BLD-MCNC: 13.5 G/DL
HGB UR QL STRIP.AUTO: NEGATIVE
IMM GRANULOCYTES NFR BLD AUTO: 0.6 %
KETONES UR-MCNC: NEGATIVE
LEUKOCYTE ESTERASE UR QL STRIP: NEGATIVE
LYMPHOCYTES # BLD AUTO: 1.41 K/UL
LYMPHOCYTES NFR BLD AUTO: 28.3 %
MAN DIFF?: NORMAL
MCHC RBC-ENTMCNC: 29.1 PG
MCHC RBC-ENTMCNC: 31.5 GM/DL
MCV RBC AUTO: 92.5 FL
MONOCYTES # BLD AUTO: 0.38 K/UL
MONOCYTES NFR BLD AUTO: 7.6 %
NEUTROPHILS # BLD AUTO: 3.06 K/UL
NEUTROPHILS NFR BLD AUTO: 61.5 %
NITRITE UR QL STRIP: NEGATIVE
PH UR STRIP: 7.5
PLATELET # BLD AUTO: 363 K/UL
POTASSIUM SERPL-SCNC: 4.2 MMOL/L
PROT SERPL-MCNC: 6.8 G/DL
PROT UR STRIP-MCNC: NORMAL
RBC # BLD: 4.64 M/UL
RBC # FLD: 12 %
SODIUM SERPL-SCNC: 142 MMOL/L
SP GR UR STRIP: 1.01
WBC # FLD AUTO: 4.98 K/UL

## 2019-11-21 ENCOUNTER — RX RENEWAL (OUTPATIENT)
Age: 64
End: 2019-11-21

## 2019-12-23 ENCOUNTER — MEDICATION RENEWAL (OUTPATIENT)
Age: 64
End: 2019-12-23

## 2020-02-26 ENCOUNTER — RX RENEWAL (OUTPATIENT)
Age: 65
End: 2020-02-26

## 2020-03-14 ENCOUNTER — APPOINTMENT (OUTPATIENT)
Dept: INTERNAL MEDICINE | Facility: CLINIC | Age: 65
End: 2020-03-14
Payer: COMMERCIAL

## 2020-03-14 ENCOUNTER — APPOINTMENT (OUTPATIENT)
Dept: RADIOLOGY | Facility: CLINIC | Age: 65
End: 2020-03-14
Payer: COMMERCIAL

## 2020-03-14 ENCOUNTER — OUTPATIENT (OUTPATIENT)
Dept: OUTPATIENT SERVICES | Facility: HOSPITAL | Age: 65
LOS: 1 days | End: 2020-03-14
Payer: COMMERCIAL

## 2020-03-14 VITALS — BODY MASS INDEX: 22.98 KG/M2 | OXYGEN SATURATION: 98 % | HEART RATE: 90 BPM | HEIGHT: 66 IN | WEIGHT: 143 LBS

## 2020-03-14 VITALS — DIASTOLIC BLOOD PRESSURE: 70 MMHG | SYSTOLIC BLOOD PRESSURE: 118 MMHG

## 2020-03-14 DIAGNOSIS — M54.9 DORSALGIA, UNSPECIFIED: ICD-10-CM

## 2020-03-14 DIAGNOSIS — Z98.89 OTHER SPECIFIED POSTPROCEDURAL STATES: Chronic | ICD-10-CM

## 2020-03-14 PROCEDURE — 99213 OFFICE O/P EST LOW 20 MIN: CPT

## 2020-03-14 PROCEDURE — 71046 X-RAY EXAM CHEST 2 VIEWS: CPT | Mod: 26

## 2020-03-14 PROCEDURE — 71046 X-RAY EXAM CHEST 2 VIEWS: CPT

## 2020-03-14 NOTE — PHYSICAL EXAM
[No Acute Distress] : no acute distress [Normal Outer Ear/Nose] : the outer ears and nose were normal in appearance [No JVD] : no jugular venous distention [No Lymphadenopathy] : no lymphadenopathy [Supple] : supple [No Respiratory Distress] : no respiratory distress  [No Accessory Muscle Use] : no accessory muscle use [Clear to Auscultation] : lungs were clear to auscultation bilaterally [Normal Rate] : normal rate  [Regular Rhythm] : with a regular rhythm [No Edema] : there was no peripheral edema [No Extremity Clubbing/Cyanosis] : no extremity clubbing/cyanosis

## 2020-03-14 NOTE — HISTORY OF PRESENT ILLNESS
[de-identified] : Pt comes for fleeting pain in mid spine when twisting or moving  Pain lasts a few seconds no hx trauma

## 2020-03-16 ENCOUNTER — APPOINTMENT (OUTPATIENT)
Dept: CARDIOLOGY | Facility: CLINIC | Age: 65
End: 2020-03-16
Payer: COMMERCIAL

## 2020-03-16 ENCOUNTER — NON-APPOINTMENT (OUTPATIENT)
Age: 65
End: 2020-03-16

## 2020-03-16 VITALS
SYSTOLIC BLOOD PRESSURE: 126 MMHG | DIASTOLIC BLOOD PRESSURE: 76 MMHG | OXYGEN SATURATION: 98 % | BODY MASS INDEX: 23.46 KG/M2 | WEIGHT: 146 LBS | HEIGHT: 66 IN | HEART RATE: 67 BPM

## 2020-03-16 PROCEDURE — 93000 ELECTROCARDIOGRAM COMPLETE: CPT

## 2020-03-16 PROCEDURE — 99214 OFFICE O/P EST MOD 30 MIN: CPT

## 2020-03-16 NOTE — HISTORY OF PRESENT ILLNESS
[FreeTextEntry1] : I saw Aren Ivory in the office today for a follow up visit. He is a 64-year-old male who underwent cardiac catheterization following an abnormal stress test in June 2012. He had a 99% LAD lesion that was treated with a drug-eluting stent. The other arteries were normal. Left ventricular end-diastolic pressure was 25. Also has diabetes and high cholesterol. He is exercising regularly and has no symptoms however he does get slightly lightheaded during exercise. His metoprolol was decreased from 25mg to 12.5 mg a day. He also is getting heartburn and saw his Gastroenterologist. This was well controlled with ranitidine.\par \par Blood work performed in 9/19 demonstrates a cholesterol 137, triglycerides 77, HDL 46, and LDL 77. A1c was 6.5.. Chemistry and CBC were unremarkable.\par \par  He had a carotid Doppler in December 12/18 that showed mild plaque. Repeat study 7/15 demonstrated a mild plaque.\par \par The patient underwent a nuclear stress test 6/14 that showed no ischemia and a workload of 11 mets.\par \par The patient in the past has been intolerant to even very small doses of ACE inhibitor because of dizziness. I have no evidence that the small dose of metoprolol is effective and recent studies indicate that people with stable coronary disease do not need to be on a beta blocker. last visit we tried stopping the beta blocker and trying a small dose ramipril. He was intolerant to this.\par \par  He went for a regular stress test 4/17 which showed no ischemia at workload of 12 Mets. Echocardiogram was unremarkable.\par \par The patient did fairly well without any chest or shortness of breath. One episode of dizziness associated with a spike in blood pressure. Blood pressure otherwise has been stable. ECG shows sinus rhythm without change.\par

## 2020-03-16 NOTE — DISCUSSION/SUMMARY
[FreeTextEntry1] : The patient is doing well without any signs or symptoms of active heart disease. His medication his blood sugar, cholesterol, and blood pressure under good control.\par \par I would appreciate your sending copy of his most recent blood my review. If he has any problems or symptoms he will call me. He will stay on his present medication. I will see him in 6 months at which time he'll be ready for a stress test

## 2020-05-17 ENCOUNTER — EMERGENCY (EMERGENCY)
Facility: HOSPITAL | Age: 65
LOS: 1 days | Discharge: HOME | End: 2020-05-17
Attending: EMERGENCY MEDICINE | Admitting: EMERGENCY MEDICINE
Payer: COMMERCIAL

## 2020-05-17 VITALS
HEART RATE: 79 BPM | RESPIRATION RATE: 16 BRPM | SYSTOLIC BLOOD PRESSURE: 133 MMHG | HEIGHT: 66 IN | TEMPERATURE: 97 F | DIASTOLIC BLOOD PRESSURE: 75 MMHG | OXYGEN SATURATION: 98 % | WEIGHT: 139.99 LBS

## 2020-05-17 DIAGNOSIS — Z98.89 OTHER SPECIFIED POSTPROCEDURAL STATES: Chronic | ICD-10-CM

## 2020-05-17 LAB
ALBUMIN SERPL ELPH-MCNC: 4 G/DL — SIGNIFICANT CHANGE UP (ref 3.3–5)
ALP SERPL-CCNC: 64 U/L — SIGNIFICANT CHANGE UP (ref 40–120)
ALT FLD-CCNC: 27 U/L — SIGNIFICANT CHANGE UP (ref 12–78)
ANION GAP SERPL CALC-SCNC: 9 MMOL/L — SIGNIFICANT CHANGE UP (ref 5–17)
AST SERPL-CCNC: 22 U/L — SIGNIFICANT CHANGE UP (ref 15–37)
BILIRUB SERPL-MCNC: 0.8 MG/DL — SIGNIFICANT CHANGE UP (ref 0.2–1.2)
BUN SERPL-MCNC: 27 MG/DL — HIGH (ref 7–23)
CALCIUM SERPL-MCNC: 8.5 MG/DL — SIGNIFICANT CHANGE UP (ref 8.5–10.1)
CHLORIDE SERPL-SCNC: 105 MMOL/L — SIGNIFICANT CHANGE UP (ref 96–108)
CO2 SERPL-SCNC: 25 MMOL/L — SIGNIFICANT CHANGE UP (ref 22–31)
CREAT SERPL-MCNC: 0.94 MG/DL — SIGNIFICANT CHANGE UP (ref 0.5–1.3)
GLUCOSE SERPL-MCNC: 140 MG/DL — HIGH (ref 70–99)
HCT VFR BLD CALC: 39 % — SIGNIFICANT CHANGE UP (ref 39–50)
HGB BLD-MCNC: 12.8 G/DL — LOW (ref 13–17)
MAGNESIUM SERPL-MCNC: 1.9 MG/DL — SIGNIFICANT CHANGE UP (ref 1.6–2.6)
MCHC RBC-ENTMCNC: 30.3 PG — SIGNIFICANT CHANGE UP (ref 27–34)
MCHC RBC-ENTMCNC: 32.8 GM/DL — SIGNIFICANT CHANGE UP (ref 32–36)
MCV RBC AUTO: 92.2 FL — SIGNIFICANT CHANGE UP (ref 80–100)
NRBC # BLD: 0 /100 WBCS — SIGNIFICANT CHANGE UP (ref 0–0)
PLATELET # BLD AUTO: 205 K/UL — SIGNIFICANT CHANGE UP (ref 150–400)
POTASSIUM SERPL-MCNC: 3.9 MMOL/L — SIGNIFICANT CHANGE UP (ref 3.5–5.3)
POTASSIUM SERPL-SCNC: 3.9 MMOL/L — SIGNIFICANT CHANGE UP (ref 3.5–5.3)
PROT SERPL-MCNC: 7.1 G/DL — SIGNIFICANT CHANGE UP (ref 6–8.3)
RBC # BLD: 4.23 M/UL — SIGNIFICANT CHANGE UP (ref 4.2–5.8)
RBC # FLD: 12.3 % — SIGNIFICANT CHANGE UP (ref 10.3–14.5)
SARS-COV-2 RNA SPEC QL NAA+PROBE: SIGNIFICANT CHANGE UP
SODIUM SERPL-SCNC: 139 MMOL/L — SIGNIFICANT CHANGE UP (ref 135–145)
TROPONIN I SERPL-MCNC: <.015 NG/ML — SIGNIFICANT CHANGE UP (ref 0.01–0.04)
WBC # BLD: 9.72 K/UL — SIGNIFICANT CHANGE UP (ref 3.8–10.5)
WBC # FLD AUTO: 9.72 K/UL — SIGNIFICANT CHANGE UP (ref 3.8–10.5)

## 2020-05-17 PROCEDURE — 71046 X-RAY EXAM CHEST 2 VIEWS: CPT

## 2020-05-17 PROCEDURE — 71046 X-RAY EXAM CHEST 2 VIEWS: CPT | Mod: 26

## 2020-05-17 PROCEDURE — 84484 ASSAY OF TROPONIN QUANT: CPT

## 2020-05-17 PROCEDURE — 85027 COMPLETE CBC AUTOMATED: CPT

## 2020-05-17 PROCEDURE — 93010 ELECTROCARDIOGRAM REPORT: CPT

## 2020-05-17 PROCEDURE — 99285 EMERGENCY DEPT VISIT HI MDM: CPT

## 2020-05-17 PROCEDURE — 70450 CT HEAD/BRAIN W/O DYE: CPT | Mod: 26

## 2020-05-17 PROCEDURE — 87635 SARS-COV-2 COVID-19 AMP PRB: CPT

## 2020-05-17 PROCEDURE — 93005 ELECTROCARDIOGRAM TRACING: CPT

## 2020-05-17 PROCEDURE — 70450 CT HEAD/BRAIN W/O DYE: CPT

## 2020-05-17 PROCEDURE — 99284 EMERGENCY DEPT VISIT MOD MDM: CPT | Mod: 25

## 2020-05-17 PROCEDURE — 80053 COMPREHEN METABOLIC PANEL: CPT

## 2020-05-17 PROCEDURE — 83735 ASSAY OF MAGNESIUM: CPT

## 2020-05-17 PROCEDURE — 36415 COLL VENOUS BLD VENIPUNCTURE: CPT

## 2020-05-17 RX ORDER — SODIUM CHLORIDE 9 MG/ML
1000 INJECTION INTRAMUSCULAR; INTRAVENOUS; SUBCUTANEOUS ONCE
Refills: 0 | Status: COMPLETED | OUTPATIENT
Start: 2020-05-17 | End: 2020-05-17

## 2020-05-17 RX ADMIN — SODIUM CHLORIDE 1000 MILLILITER(S): 9 INJECTION INTRAMUSCULAR; INTRAVENOUS; SUBCUTANEOUS at 20:25

## 2020-05-17 NOTE — ED ADULT NURSE NOTE - CHIEF COMPLAINT QUOTE
woke up from nap at 1700 felt dizzy, room spinning and diaphoretic.  went to bathroom to have a BM, wife pt had near syncopal episode and assisted pt to floor

## 2020-05-17 NOTE — ED PROVIDER NOTE - OBJECTIVE STATEMENT
64 year old male with history of DM, HTN, HLD, and CAD (stent 2012) presents with syncope. patient was feeling a little lightheaded around 4:00 pm. went to take a nap. woke up around 1700. felt dizzy, room was spinning, and slightly diaphoretic. went to bathroom to have a BM and had syncopal event. wife was present and states she believes he may have lost LOC for 1-2 sec. helped him to the floor. overall patient feels improved on arrival to ED. slightly lightheaded, but denies any spinning sensation, HA, chest pain, shortness of breath, fevers, cough, or recent illnessess. reports feeling lightheaded 2 weeks ago with spinning sensation, but symptoms resolved.   PCP Camila Delaney

## 2020-05-17 NOTE — ED PROVIDER NOTE - ATTENDING CONTRIBUTION TO CARE
pt bib wife s/p syncope. pt relates felt lightheaded at approx 1600, took a nap for 1 hr, awoke with room spinning dizziness, went to bathroom, had bm, got diaphoretic, dizzy, passed out for 1-2 seconds, caught by wife, lowered to floor. pt no longer feels room spinning dizziness, but still a little lightheaded. no fevers, chills, cough, sob, cp, palp, sob, abd pain, n/v, focal weakness or numbness.  wd, wn male, nad perrl, eomi, mmm, s1s2 rrr, normal pulses, lungs cta, abd soft, nt, ext nt, full rom, cn 2-12 intact, no motor or sensory deficits, skin warm dry no rash

## 2020-05-17 NOTE — ED PROVIDER NOTE - NSFOLLOWUPINSTRUCTIONS_ED_ALL_ED_FT
Syncope    Syncope is when you temporarily lose consciousness, also called fainting or passing out. It is caused by a sudden decrease in blood flow to the brain. Even though most causes of syncope are not dangerous, syncope can possibly be a sign of a serious medical problem. Signs that you may be about to faint include feeling dizzy, lightheaded, nausea, visual changes, or cold/clammy skin. Do not drive, operate heavy machinery, or play sports until your health care provider says it is okay.    SEEK IMMEDIATE MEDICAL CARE IF YOU HAVE ANY OF THE FOLLOWING SYMPTOMS: severe headache, pain in your chest/abdomen/back, bleeding from your mouth or rectum, palpitations, shortness of breath, pain with breathing, seizure, confusion, or trouble walking.     Dizziness    Dizziness can manifest as a feeling of unsteadiness or light-headedness. You may feel like you are about to faint. This condition can be caused by a number of things, including medicines, dehydration, or illness. Drink enough fluid to keep your urine clear or pale yellow. Do not drink alcohol and limit your caffeine intake. Avoid quick or sudden movements.  Rise slowly from chairs and steady yourself until you feel okay. In the morning, first sit up on the side of the bed.    SEEK IMMEDIATE MEDICAL CARE IF YOU HAVE ANY OF THE FOLLOWING SYMPTOMS: vomiting, changes in your vision or speech, weakness in your arms or legs, trouble speaking or swallowing, chest pain, abdominal pain, shortness of breath, sweating, bleeding, headache, neck pain, or fever.

## 2020-05-17 NOTE — ED PROVIDER NOTE - CARE PROVIDER_API CALL
Viral Coy  CARDIOVASCULAR DISEASE  [address] [place] [address]  Phone: [phone]  Fax: [phone]  Follow Up Time: 1-3 Days

## 2020-05-17 NOTE — ED PROVIDER NOTE - PATIENT PORTAL LINK FT
You can access the FollowMyHealth Patient Portal offered by [facility] by registering at the following website: [email or web address]. By joining [facility]’s FollowMyHealth portal, you will also be able to view your health information using other applications (apps) compatible with our system.

## 2020-05-17 NOTE — ED PROVIDER NOTE - CLINICAL SUMMARY MEDICAL DECISION MAKING FREE TEXT BOX
syncopal event PTA. felt lightheaded. no chest pain, shortness of breath, focal weakness, or HA. differentials include but not limited to ACS, arrythmia, vasovagal, vertigo, dehydration, anemia, electrolyte abnormality. will check labs, CXR, EKG, head CT, orthostatics

## 2020-05-18 ENCOUNTER — APPOINTMENT (OUTPATIENT)
Dept: INTERNAL MEDICINE | Facility: CLINIC | Age: 65
End: 2020-05-18
Payer: COMMERCIAL

## 2020-05-18 VITALS — SYSTOLIC BLOOD PRESSURE: 118 MMHG | DIASTOLIC BLOOD PRESSURE: 72 MMHG

## 2020-05-18 VITALS
RESPIRATION RATE: 18 BRPM | DIASTOLIC BLOOD PRESSURE: 59 MMHG | OXYGEN SATURATION: 98 % | HEART RATE: 67 BPM | SYSTOLIC BLOOD PRESSURE: 105 MMHG

## 2020-05-18 VITALS
RESPIRATION RATE: 14 BRPM | HEART RATE: 71 BPM | BODY MASS INDEX: 22.5 KG/M2 | TEMPERATURE: 97.8 F | WEIGHT: 140 LBS | OXYGEN SATURATION: 100 % | HEIGHT: 66 IN

## 2020-05-18 LAB — TROPONIN I SERPL-MCNC: <.015 NG/ML — SIGNIFICANT CHANGE UP (ref 0.01–0.04)

## 2020-05-18 PROCEDURE — 99214 OFFICE O/P EST MOD 30 MIN: CPT

## 2020-05-18 RX ORDER — METAXALONE 800 MG/1
800 TABLET ORAL AT BEDTIME
Qty: 10 | Refills: 1 | Status: DISCONTINUED | COMMUNITY
Start: 2019-07-19 | End: 2020-05-18

## 2020-05-18 RX ORDER — DICLOFENAC SODIUM 75 MG/1
75 TABLET, DELAYED RELEASE ORAL TWICE DAILY
Qty: 20 | Refills: 0 | Status: DISCONTINUED | COMMUNITY
Start: 2019-07-19 | End: 2020-05-18

## 2020-05-18 RX ORDER — FAMOTIDINE 20 MG/1
20 TABLET, FILM COATED ORAL
Qty: 90 | Refills: 3 | Status: DISCONTINUED | COMMUNITY
Start: 2019-10-21 | End: 2020-05-18

## 2020-05-18 NOTE — HISTORY OF PRESENT ILLNESS
[de-identified] : Pt is s/p hospital for syncope   Had ct brain and troponins and neg covid  Slightly dehydrated  This is second time in 3 weeks where he had sweating and then BM and then syncope.  Seeing cardio tomorrow

## 2020-05-18 NOTE — REVIEW OF SYSTEMS
[Fever] : no fever [Chills] : no chills [Vision Problems] : no vision problems [Chest Pain] : no chest pain [Palpitations] : no palpitations [Lower Ext Edema] : no lower extremity edema [Shortness Of Breath] : no shortness of breath [Wheezing] : no wheezing [Headache] : no headache [Cough] : no cough [Dizziness] : no dizziness [Unsteady Walk] : no ataxia

## 2020-05-18 NOTE — PHYSICAL EXAM
[No Acute Distress] : no acute distress [PERRL] : pupils equal round and reactive to light [Well Nourished] : well nourished [Normal Sclera/Conjunctiva] : normal sclera/conjunctiva [EOMI] : extraocular movements intact [No JVD] : no jugular venous distention [Normal Outer Ear/Nose] : the outer ears and nose were normal in appearance [No Respiratory Distress] : no respiratory distress  [No Lymphadenopathy] : no lymphadenopathy [No Accessory Muscle Use] : no accessory muscle use [Clear to Auscultation] : lungs were clear to auscultation bilaterally [Regular Rhythm] : with a regular rhythm [Normal Rate] : normal rate  [Normal S1, S2] : normal S1 and S2 [Coordination Grossly Intact] : coordination grossly intact [No Focal Deficits] : no focal deficits [Normal Gait] : normal gait [de-identified] : finger to nose normal  Neg romberg  Point localization normal

## 2020-05-19 ENCOUNTER — APPOINTMENT (OUTPATIENT)
Dept: CARDIOLOGY | Facility: CLINIC | Age: 65
End: 2020-05-19
Payer: COMMERCIAL

## 2020-05-19 VITALS
HEIGHT: 66 IN | BODY MASS INDEX: 23.63 KG/M2 | WEIGHT: 147 LBS | DIASTOLIC BLOOD PRESSURE: 75 MMHG | OXYGEN SATURATION: 99 % | HEART RATE: 74 BPM | SYSTOLIC BLOOD PRESSURE: 122 MMHG

## 2020-05-19 DIAGNOSIS — R55 SYNCOPE AND COLLAPSE: ICD-10-CM

## 2020-05-19 PROCEDURE — 99214 OFFICE O/P EST MOD 30 MIN: CPT

## 2020-05-19 NOTE — REVIEW OF SYSTEMS
[Blurry Vision] : blurred vision [Heartburn] : heartburn [Dizziness] : dizziness [Negative] : Genitourinary [Headache] : no headache [Chills] : no chills [Fever] : no fever [Eyeglasses] : not currently wearing eyeglasses [Feeling Fatigued] : not feeling fatigued [Earache] : no earache [Sinus Pressure] : no sinus pressure [Sore Throat] : no sore throat [Joint Pain] : no joint pain [Skin: A Rash] : no rash: [Skin Lesions] : no skin lesions [Tremor] : no tremor was seen [Excessive Thirst] : no polydipsia [Convulsions] : no convulsions [Easy Bruising] : no tendency for easy bruising [Easy Bleeding] : no tendency for easy bleeding

## 2020-05-19 NOTE — DISCUSSION/SUMMARY
[FreeTextEntry1] : I would doubt this episode was cardiac in etiology. He had severe vertigo. He will make an appointment to see the neurologist. I would interest I to see if the CAT scan is consistent with a small infarct.\par \par In the meantime the patient will stay on his present medication. Further recommendation made pending the neurological evaluation. Will also schedule a followup carotid Doppler. The Doppler from 2018 showed a mild plaque.\par \par We went over the ER visit and I answered his questions.

## 2020-05-19 NOTE — REASON FOR VISIT
[Follow-Up - Clinic] : a clinic follow-up of [Chest Pain] : chest pain [Coronary Artery Disease] : coronary artery disease [Hyperlipidemia] : hyperlipidemia [FreeTextEntry1] : Diabetes, vertigo

## 2020-05-19 NOTE — PHYSICAL EXAM
[General Appearance - Well Developed] : well developed [Normal Appearance] : normal appearance [Well Groomed] : well groomed [No Deformities] : no deformities [General Appearance - Well Nourished] : well nourished [General Appearance - In No Acute Distress] : no acute distress [Normal Conjunctiva] : the conjunctiva exhibited no abnormalities [Normal Oral Mucosa] : normal oral mucosa [Normal Jugular Venous A Waves Present] : normal jugular venous A waves present [Normal Jugular Venous V Waves Present] : normal jugular venous V waves present [No Jugular Venous Dyson A Waves] : no jugular venous dyson A waves [Respiration, Rhythm And Depth] : normal respiratory rhythm and effort [Exaggerated Use Of Accessory Muscles For Inspiration] : no accessory muscle use [Auscultation Breath Sounds / Voice Sounds] : lungs were clear to auscultation bilaterally [Bowel Sounds] : normal bowel sounds [Abdomen Soft] : soft [Abdomen Tenderness] : non-tender [Abnormal Walk] : normal gait [Gait - Sufficient For Exercise Testing] : the gait was sufficient for exercise testing [Nail Clubbing] : no clubbing of the fingernails [Cyanosis, Localized] : no localized cyanosis [Skin Color & Pigmentation] : normal skin color and pigmentation [Skin Turgor] : normal skin turgor [] : no rash [Oriented To Time, Place, And Person] : oriented to person, place, and time [Impaired Insight] : insight and judgment were intact [No Anxiety] : not feeling anxious [Normal S1] : normal S1 [Normal Rate] : normal [Normal S2] : normal S2 [No Murmur] : no murmurs heard [2+] : left 2+ [1+] : left 1+ [No Pitting Edema] : no pitting edema present [No Abnormalities] : the abdominal aorta was not enlarged and no bruit was heard [S3] : no S3 [Left Carotid Bruit] : no bruit heard over the left carotid [Right Carotid Bruit] : no bruit heard over the right carotid [S4] : no S4 [Left Femoral Bruit] : no bruit heard over the left femoral artery [Right Femoral Bruit] : no bruit heard over the right femoral artery

## 2020-05-19 NOTE — HISTORY OF PRESENT ILLNESS
[FreeTextEntry1] : I saw Aren Ivory in the office today for a follow up visit. He is a 64-year-old male who underwent cardiac catheterization following an abnormal stress test in June 2012. He had a 99% LAD lesion that was treated with a drug-eluting stent. The other arteries were normal. Left ventricular end-diastolic pressure was 25. Also has diabetes and high cholesterol. He is exercising regularly and has no symptoms however he does get slightly lightheaded during exercise. His metoprolol was decreased from 25mg to 12.5 mg a day. He also is getting heartburn and saw his Gastroenterologist. \par \par The patient had been doing well. Approximately 2 weeks ago, after working long hours he went to sleep. He felt lightheaded. Went to the bathroom and had an episode of severe vertigo. Room spinning. He felt nauseous, and had a lot of balance issues. This resolved. This past week he had another episode. This occurred after taking a nap during the day. On Sunday he felt lightheaded and laid down. When he woke up he had another episode of severe vertigo with room spinning, nausea, and lightheadedness. He went to the bathroom and passed out for a few seconds. He went to the emergency room where his EKG, blood work including troponin were negative. He had no chest pain or shortness of breath. A CAT scan showed a hyperlucent area in the right frontal horn that was felt to possibly represent a prior infarct. I looked at his previous CAT scan and MRI scan and this was not commented on.\par \par Blood work performed in 9/19 demonstrates a cholesterol 137, triglycerides 77, HDL 46, and LDL 77. A1c was 6.5.. Chemistry and CBC were unremarkable.\par \par  He had a carotid Doppler in December 12/18 that showed mild plaque. Repeat study 7/15 demonstrated a mild plaque.\par \par The patient underwent a nuclear stress test 6/14 that showed no ischemia and a workload of 11 mets.\par \par The patient in the past has been intolerant to even very small doses of ACE inhibitor because of dizziness. I have no evidence that the small dose of metoprolol is effective and recent studies indicate that people with stable coronary disease do not need to be on a beta blocker. last visit we tried stopping the beta blocker and trying a small dose ramipril. He was intolerant to this.\par \par  He went for a regular stress test 4/17 which showed no ischemia at workload of 12 Mets. Echocardiogram was unremarkable.\par \par The patient did fairly well without any chest or shortness of breath. One episode of dizziness associated with a spike in blood pressure. Blood pressure otherwise has been stable. ECG shows sinus rhythm without change.\par

## 2020-05-20 ENCOUNTER — APPOINTMENT (OUTPATIENT)
Dept: INTERNAL MEDICINE | Facility: CLINIC | Age: 65
End: 2020-05-20

## 2020-05-26 ENCOUNTER — APPOINTMENT (OUTPATIENT)
Dept: ULTRASOUND IMAGING | Facility: CLINIC | Age: 65
End: 2020-05-26
Payer: COMMERCIAL

## 2020-05-26 ENCOUNTER — OUTPATIENT (OUTPATIENT)
Dept: OUTPATIENT SERVICES | Facility: HOSPITAL | Age: 65
LOS: 1 days | End: 2020-05-26
Payer: COMMERCIAL

## 2020-05-26 DIAGNOSIS — R55 SYNCOPE AND COLLAPSE: ICD-10-CM

## 2020-05-26 DIAGNOSIS — Z98.89 OTHER SPECIFIED POSTPROCEDURAL STATES: Chronic | ICD-10-CM

## 2020-05-26 PROCEDURE — 93880 EXTRACRANIAL BILAT STUDY: CPT

## 2020-05-26 PROCEDURE — 93880 EXTRACRANIAL BILAT STUDY: CPT | Mod: 26

## 2020-05-29 ENCOUNTER — APPOINTMENT (OUTPATIENT)
Dept: ULTRASOUND IMAGING | Facility: CLINIC | Age: 65
End: 2020-05-29

## 2020-06-29 ENCOUNTER — APPOINTMENT (OUTPATIENT)
Dept: GASTROENTEROLOGY | Facility: CLINIC | Age: 65
End: 2020-06-29

## 2020-07-28 ENCOUNTER — APPOINTMENT (OUTPATIENT)
Dept: INTERNAL MEDICINE | Facility: CLINIC | Age: 65
End: 2020-07-28
Payer: COMMERCIAL

## 2020-07-28 ENCOUNTER — NON-APPOINTMENT (OUTPATIENT)
Age: 65
End: 2020-07-28

## 2020-07-28 VITALS
OXYGEN SATURATION: 99 % | WEIGHT: 137 LBS | HEIGHT: 66 IN | HEART RATE: 80 BPM | BODY MASS INDEX: 22.02 KG/M2 | TEMPERATURE: 98.5 F

## 2020-07-28 VITALS — SYSTOLIC BLOOD PRESSURE: 112 MMHG | DIASTOLIC BLOOD PRESSURE: 62 MMHG

## 2020-07-28 DIAGNOSIS — Z20.828 CONTACT WITH AND (SUSPECTED) EXPOSURE TO OTHER VIRAL COMMUNICABLE DISEASES: ICD-10-CM

## 2020-07-28 PROCEDURE — 36415 COLL VENOUS BLD VENIPUNCTURE: CPT

## 2020-07-28 PROCEDURE — 90732 PPSV23 VACC 2 YRS+ SUBQ/IM: CPT

## 2020-07-28 PROCEDURE — 90471 IMMUNIZATION ADMIN: CPT

## 2020-07-28 PROCEDURE — 93000 ELECTROCARDIOGRAM COMPLETE: CPT

## 2020-07-28 PROCEDURE — 99396 PREV VISIT EST AGE 40-64: CPT | Mod: 25

## 2020-07-28 RX ORDER — RANITIDINE 150 MG/1
150 TABLET ORAL DAILY
Refills: 0 | Status: DISCONTINUED | COMMUNITY
End: 2020-07-28

## 2020-07-28 NOTE — REVIEW OF SYSTEMS
[Fever] : no fever [Chills] : no chills [Discharge] : no discharge [Pain] : no pain [Earache] : no earache [Vision Problems] : no vision problems [Sore Throat] : no sore throat [Hoarseness] : no hoarseness [Chest Pain] : no chest pain [Palpitations] : no palpitations [Lower Ext Edema] : no lower extremity edema [Shortness Of Breath] : no shortness of breath [Wheezing] : no wheezing [Cough] : no cough [Nausea] : no nausea [Constipation] : no constipation [Abdominal Pain] : no abdominal pain [Incontinence] : no incontinence [Dysuria] : no dysuria [Frequency] : no frequency [Joint Pain] : no joint pain [Joint Stiffness] : no joint stiffness [Joint Swelling] : no joint swelling [Mole Changes] : no mole changes [Skin Rash] : no skin rash [Headache] : no headache [Dizziness] : no dizziness [Memory Loss] : no memory loss [Anxiety] : no anxiety [Depression] : no depression [Easy Bleeding] : no easy bleeding [Easy Bruising] : no easy bruising [FreeTextEntry7] : last colonoscopy 2014  Dr Luque

## 2020-07-28 NOTE — PHYSICAL EXAM
[No Acute Distress] : no acute distress [Well Nourished] : well nourished [Normal Sclera/Conjunctiva] : normal sclera/conjunctiva [PERRL] : pupils equal round and reactive to light [EOMI] : extraocular movements intact [Normal Outer Ear/Nose] : the outer ears and nose were normal in appearance [Normal Oropharynx] : the oropharynx was normal [No JVD] : no jugular venous distention [No Lymphadenopathy] : no lymphadenopathy [Supple] : supple [No Respiratory Distress] : no respiratory distress  [No Accessory Muscle Use] : no accessory muscle use [Clear to Auscultation] : lungs were clear to auscultation bilaterally [Normal Rate] : normal rate  [Regular Rhythm] : with a regular rhythm [Normal S1, S2] : normal S1 and S2 [No Carotid Bruits] : no carotid bruits [No Varicosities] : no varicosities [No Edema] : there was no peripheral edema [No Extremity Clubbing/Cyanosis] : no extremity clubbing/cyanosis [Non Tender] : non-tender [Soft] : abdomen soft [Non-distended] : non-distended [No Masses] : no abdominal mass palpated [No HSM] : no HSM [Declined Rectal Exam] : declined rectal exam [Normal Bowel Sounds] : normal bowel sounds [Normal Posterior Cervical Nodes] : no posterior cervical lymphadenopathy [Normal Anterior Cervical Nodes] : no anterior cervical lymphadenopathy [No CVA Tenderness] : no CVA  tenderness [No Spinal Tenderness] : no spinal tenderness [Grossly Normal Strength/Tone] : grossly normal strength/tone [No Joint Swelling] : no joint swelling [Coordination Grossly Intact] : coordination grossly intact [No Focal Deficits] : no focal deficits [Normal Gait] : normal gait [Normal Affect] : the affect was normal [Normal Insight/Judgement] : insight and judgment were intact

## 2020-07-29 LAB
ALBUMIN SERPL ELPH-MCNC: 4.9 G/DL
ALP BLD-CCNC: 62 U/L
ALT SERPL-CCNC: 17 U/L
ANION GAP SERPL CALC-SCNC: 14 MMOL/L
AST SERPL-CCNC: 20 U/L
BASOPHILS # BLD AUTO: 0.03 K/UL
BASOPHILS NFR BLD AUTO: 0.6 %
BILIRUB SERPL-MCNC: 1 MG/DL
BUN SERPL-MCNC: 20 MG/DL
CALCIUM SERPL-MCNC: 9.6 MG/DL
CHLORIDE SERPL-SCNC: 99 MMOL/L
CHOLEST SERPL-MCNC: 130 MG/DL
CHOLEST/HDLC SERPL: 3.1 RATIO
CO2 SERPL-SCNC: 28 MMOL/L
CREAT SERPL-MCNC: 0.94 MG/DL
CREAT SPEC-SCNC: 179 MG/DL
EOSINOPHIL # BLD AUTO: 0.17 K/UL
EOSINOPHIL NFR BLD AUTO: 3.4 %
ESTIMATED AVERAGE GLUCOSE: 140 MG/DL
GLUCOSE SERPL-MCNC: 110 MG/DL
HBA1C MFR BLD HPLC: 6.5 %
HCT VFR BLD CALC: 44.6 %
HDLC SERPL-MCNC: 42 MG/DL
HGB BLD-MCNC: 13.8 G/DL
IMM GRANULOCYTES NFR BLD AUTO: 0.2 %
LDLC SERPL CALC-MCNC: 78 MG/DL
LYMPHOCYTES # BLD AUTO: 1.1 K/UL
LYMPHOCYTES NFR BLD AUTO: 22 %
MAN DIFF?: NORMAL
MCHC RBC-ENTMCNC: 29.7 PG
MCHC RBC-ENTMCNC: 30.9 GM/DL
MCV RBC AUTO: 96.1 FL
MICROALBUMIN 24H UR DL<=1MG/L-MCNC: 1.4 MG/DL
MICROALBUMIN/CREAT 24H UR-RTO: 8 MG/G
MONOCYTES # BLD AUTO: 0.27 K/UL
MONOCYTES NFR BLD AUTO: 5.4 %
NEUTROPHILS # BLD AUTO: 3.42 K/UL
NEUTROPHILS NFR BLD AUTO: 68.4 %
PLATELET # BLD AUTO: 197 K/UL
POTASSIUM SERPL-SCNC: 4.3 MMOL/L
PROT SERPL-MCNC: 6.9 G/DL
PSA SERPL-MCNC: 0.55 NG/ML
RBC # BLD: 4.64 M/UL
RBC # FLD: 11.9 %
SARS-COV-2 IGG SERPL IA-ACNC: 0.01 INDEX
SARS-COV-2 IGG SERPL QL IA: NEGATIVE
SODIUM SERPL-SCNC: 141 MMOL/L
TRIGL SERPL-MCNC: 48 MG/DL
TSH SERPL-ACNC: 0.71 UIU/ML
WBC # FLD AUTO: 5 K/UL

## 2020-08-07 ENCOUNTER — APPOINTMENT (OUTPATIENT)
Dept: NEUROLOGY | Facility: CLINIC | Age: 65
End: 2020-08-07

## 2020-08-24 ENCOUNTER — RX RENEWAL (OUTPATIENT)
Age: 65
End: 2020-08-24

## 2020-10-07 ENCOUNTER — APPOINTMENT (OUTPATIENT)
Dept: GASTROENTEROLOGY | Facility: CLINIC | Age: 65
End: 2020-10-07
Payer: COMMERCIAL

## 2020-10-07 VITALS
DIASTOLIC BLOOD PRESSURE: 64 MMHG | TEMPERATURE: 98 F | SYSTOLIC BLOOD PRESSURE: 120 MMHG | HEIGHT: 66 IN | RESPIRATION RATE: 16 BRPM | OXYGEN SATURATION: 96 % | BODY MASS INDEX: 22.5 KG/M2 | HEART RATE: 72 BPM | WEIGHT: 140 LBS

## 2020-10-07 PROCEDURE — 99203 OFFICE O/P NEW LOW 30 MIN: CPT

## 2020-10-07 NOTE — ASSESSMENT
[FreeTextEntry1] : This is a 64-year-old man with chronic GERD responding to famotidine 20 mg only once a day. I recommend famotidine 20 mg twice a day. A prescription was sent to the pharmacy. I reviewed with him results of endoscopy and colonoscopy. At this point in time he does not need a screening colonoscopy given that he had a colonoscopy 6 years ago and he has no alarm GI symptoms or family history of colon cancer. I recommend he calls me if he has more frequent symptoms of coughing or feeling like he is choking on his foods. At which time I will order a modified barium swallow. He states that he will hold off on the test and call me if he has worsening symptoms. I will see him. next year for followup visit but he is instructed to call me with questions or concerns

## 2020-10-07 NOTE — HISTORY OF PRESENT ILLNESS
[FreeTextEntry1] : This is a pleasant 65 yo man with hx of CAD, HTN, DM Presenting with symptoms of chronic GERD. He was last seen in 2014 at which time he underwent both an upper endoscopy as well as a colonoscopy for GERD and for colon cancer screening. The upper endoscopy showing normal esophagus, gastritis negative for H. pylori. The colonoscopy would good colonic preparation otherwise normal exam. For the GERD, he was taking ranitidine which was pulled off the shells. Since then he has been taking over-the-counter Pepcid with response. His PMD had placed him on famotidine 20 mg daily for which she states alleviates symptoms of heartburn. He denies nausea vomiting. He denies abdominal pain. He states that his wife tells him that at times he chokes on his food. This happens infrequently. He denies changes in bowel habits. He denies rectal bleeding or melena. He states that he had lost approximately 5-6 pounds but has since leveled off. Review recent blood work showing no evidence of anemia, normal electrolytes and liver enzymes. He denies family history of colon cancer or colon polyps.

## 2020-11-03 ENCOUNTER — APPOINTMENT (OUTPATIENT)
Dept: INTERNAL MEDICINE | Facility: CLINIC | Age: 65
End: 2020-11-03
Payer: COMMERCIAL

## 2020-11-03 VITALS
HEIGHT: 66 IN | WEIGHT: 137 LBS | BODY MASS INDEX: 22.02 KG/M2 | RESPIRATION RATE: 16 BRPM | OXYGEN SATURATION: 99 % | HEART RATE: 80 BPM | TEMPERATURE: 97.8 F

## 2020-11-03 VITALS — SYSTOLIC BLOOD PRESSURE: 114 MMHG | DIASTOLIC BLOOD PRESSURE: 78 MMHG

## 2020-11-03 DIAGNOSIS — Z23 ENCOUNTER FOR IMMUNIZATION: ICD-10-CM

## 2020-11-03 PROCEDURE — 99072 ADDL SUPL MATRL&STAF TM PHE: CPT

## 2020-11-03 PROCEDURE — 90471 IMMUNIZATION ADMIN: CPT

## 2020-11-03 PROCEDURE — 99213 OFFICE O/P EST LOW 20 MIN: CPT | Mod: 25

## 2020-11-03 PROCEDURE — 90686 IIV4 VACC NO PRSV 0.5 ML IM: CPT

## 2020-11-03 PROCEDURE — 36415 COLL VENOUS BLD VENIPUNCTURE: CPT

## 2020-11-03 RX ORDER — METFORMIN ER 500 MG 500 MG/1
500 TABLET ORAL
Qty: 270 | Refills: 0 | Status: DISCONTINUED | COMMUNITY
Start: 2019-12-18 | End: 2020-11-03

## 2020-11-03 NOTE — REVIEW OF SYSTEMS
[Fever] : no fever [Chest Pain] : no chest pain [Palpitations] : no palpitations [Lower Ext Edema] : no lower extremity edema [Shortness Of Breath] : no shortness of breath [Wheezing] : no wheezing [Cough] : no cough [Abdominal Pain] : no abdominal pain

## 2020-11-03 NOTE — HISTORY OF PRESENT ILLNESS
[de-identified] : Pt comes for FBW and med renewal and flu shot   Had episode of near syncope and saw neuro  Had MRI brain which he will have sent to me

## 2020-11-03 NOTE — PHYSICAL EXAM
[No Acute Distress] : no acute distress [Normal Sclera/Conjunctiva] : normal sclera/conjunctiva [Normal Outer Ear/Nose] : the outer ears and nose were normal in appearance [No JVD] : no jugular venous distention [No Lymphadenopathy] : no lymphadenopathy [No Respiratory Distress] : no respiratory distress  [No Accessory Muscle Use] : no accessory muscle use [Clear to Auscultation] : lungs were clear to auscultation bilaterally [Normal Rate] : normal rate  [Regular Rhythm] : with a regular rhythm [No Edema] : there was no peripheral edema [No Extremity Clubbing/Cyanosis] : no extremity clubbing/cyanosis [Soft] : abdomen soft [Non Tender] : non-tender [Non-distended] : non-distended [No Masses] : no abdominal mass palpated [Coordination Grossly Intact] : coordination grossly intact [No Focal Deficits] : no focal deficits [Normal Gait] : normal gait

## 2020-11-04 ENCOUNTER — APPOINTMENT (OUTPATIENT)
Dept: CARDIOLOGY | Facility: CLINIC | Age: 65
End: 2020-11-04
Payer: COMMERCIAL

## 2020-11-04 VITALS
OXYGEN SATURATION: 99 % | HEIGHT: 65 IN | DIASTOLIC BLOOD PRESSURE: 64 MMHG | WEIGHT: 141 LBS | SYSTOLIC BLOOD PRESSURE: 104 MMHG | HEART RATE: 76 BPM | BODY MASS INDEX: 23.49 KG/M2

## 2020-11-04 LAB
25(OH)D3 SERPL-MCNC: 29.1 NG/ML
ALBUMIN SERPL ELPH-MCNC: 4.8 G/DL
ALP BLD-CCNC: 70 U/L
ALT SERPL-CCNC: 18 U/L
ANION GAP SERPL CALC-SCNC: 11 MMOL/L
AST SERPL-CCNC: 20 U/L
BASOPHILS # BLD AUTO: 0.04 K/UL
BASOPHILS NFR BLD AUTO: 1 %
BILIRUB SERPL-MCNC: 1 MG/DL
BUN SERPL-MCNC: 17 MG/DL
CALCIUM SERPL-MCNC: 9.4 MG/DL
CHLORIDE SERPL-SCNC: 102 MMOL/L
CHOLEST SERPL-MCNC: 127 MG/DL
CO2 SERPL-SCNC: 26 MMOL/L
CREAT SERPL-MCNC: 0.91 MG/DL
EOSINOPHIL # BLD AUTO: 0.18 K/UL
EOSINOPHIL NFR BLD AUTO: 4.3 %
ESTIMATED AVERAGE GLUCOSE: 143 MG/DL
GLUCOSE SERPL-MCNC: 111 MG/DL
HBA1C MFR BLD HPLC: 6.6 %
HCT VFR BLD CALC: 44.6 %
HDLC SERPL-MCNC: 43 MG/DL
HGB BLD-MCNC: 14.1 G/DL
IMM GRANULOCYTES NFR BLD AUTO: 0 %
LDLC SERPL CALC-MCNC: 69 MG/DL
LYMPHOCYTES # BLD AUTO: 1.21 K/UL
LYMPHOCYTES NFR BLD AUTO: 29.2 %
MAN DIFF?: NORMAL
MCHC RBC-ENTMCNC: 30 PG
MCHC RBC-ENTMCNC: 31.6 GM/DL
MCV RBC AUTO: 94.9 FL
MONOCYTES # BLD AUTO: 0.28 K/UL
MONOCYTES NFR BLD AUTO: 6.8 %
NEUTROPHILS # BLD AUTO: 2.43 K/UL
NEUTROPHILS NFR BLD AUTO: 58.7 %
NONHDLC SERPL-MCNC: 84 MG/DL
PLATELET # BLD AUTO: 228 K/UL
POTASSIUM SERPL-SCNC: 4.1 MMOL/L
PROT SERPL-MCNC: 6.8 G/DL
RBC # BLD: 4.7 M/UL
RBC # FLD: 12.2 %
SODIUM SERPL-SCNC: 138 MMOL/L
TRIGL SERPL-MCNC: 73 MG/DL
TSH SERPL-ACNC: 1.31 UIU/ML
WBC # FLD AUTO: 4.14 K/UL

## 2020-11-04 PROCEDURE — 99072 ADDL SUPL MATRL&STAF TM PHE: CPT

## 2020-11-04 PROCEDURE — 99214 OFFICE O/P EST MOD 30 MIN: CPT

## 2020-11-04 NOTE — DISCUSSION/SUMMARY
[FreeTextEntry1] : The patient's cardiac status is stable without any signs or symptoms of active heart disease. Exercise and has no chest pain, shortness of breath, palpitations.  These episodes of lightheadedness, may be due to low blood pressure. He gets one every 3 months. Will try stopping losartan and see if the episodes to occur. He'll call me if he has recurrent episodes.\par \par He will stay on his other medications. Blood pressure otherwise is okay  and his cholesterol and blood sugar are excellent.\par \par He will schedule a nuclear stress test sometime in the spring and I will see him in 6 months. He will call me if he does have any additional symptoms.\par \par We did go over his blood work, medication, and I answered all of his questions.

## 2020-11-04 NOTE — HISTORY OF PRESENT ILLNESS
[FreeTextEntry1] : I saw Aren Ivory in the office today for a follow up visit. He is a 65-year-old male who underwent cardiac catheterization following an abnormal stress test in June 2012. He had a 99% LAD lesion that was treated with a drug-eluting stent. The other arteries were normal. Left ventricular end-diastolic pressure was 25. Also has diabetes and high cholesterol. He is exercising regularly and has no symptoms however he does get slightly lightheaded during exercise. His metoprolol was decreased from 25mg to 12.5 mg a day. He also is getting heartburn and saw his Gastroenterologist. \par \par Last visit  5/20 after working long hours he went to sleep. He felt lightheaded. Went to the bathroom and had an episode of severe vertigo. Room spinning. He felt nauseous, and had a lot of balance issues. This resolved. This past week he had another episode. This occurred after taking a nap during the day. On Sunday he felt lightheaded and laid down. When he woke up he had another episode of severe vertigo with room spinning, nausea, and lightheadedness. He went to the bathroom and passed out for a few seconds. He went to the emergency room where his EKG, blood work including troponin were negative. He had no chest pain or shortness of breath. A CAT scan showed a hyperlucent area in the right frontal horn that was felt to possibly represent a prior infarct. I looked at his previous CAT scan and MRI scan and this was not commented on.Went to see the neurologist felt it was vertigo and a stroke. A carotid Doppler performed 5/20 showed a mild plaque without change.\par \par Since that time the patient has had 2 more episodes where he felt he would pass out. Each episode got better when he laid down. Associated with some diaphoresis but no chest pain, shortness of breath, palpitation. Hemay be having orthostatic hypotension which the losartan is exacerbating. Today's blood pressure lying down was 106/70 and fell to 100/70 standing up.\par \par Blood work 11/20  demonstrated a cholesterol of 127, triglycerides 73, HDL 43, and LDL 69. A1c was 6.6... Chemistry and CBC were unremarkable.\par \par The patient underwent a nuclear stress test 6/14 that showed no ischemia and a workload of 11 mets.\par \par  He went for a regular stress test 4/17 which showed no ischemia at workload of 12 Mets. Echocardiogram was unremarkable.\par \par The patient did fairly well without any chest or shortness of breath. One episode of dizziness associated with a spike in blood pressure. Blood pressure otherwise has been stable. ECG shows sinus rhythm without change.\par

## 2020-11-13 ENCOUNTER — RX RENEWAL (OUTPATIENT)
Age: 65
End: 2020-11-13

## 2020-11-13 ENCOUNTER — TRANSCRIPTION ENCOUNTER (OUTPATIENT)
Age: 65
End: 2020-11-13

## 2020-11-16 ENCOUNTER — RX RENEWAL (OUTPATIENT)
Age: 65
End: 2020-11-16

## 2020-12-09 NOTE — ED PROVIDER NOTE - PROGRESS NOTE DETAILS
Recommended artificial tears to use: 1 drop 4x a day in both eyes. patient stable. overall feeling better. no complaints at this time. EKG and trop unremarkable. will order second trop at midnight. spoke with Dr. Orourke (cards). agrees with plan and will follow up patient in office

## 2020-12-21 PROBLEM — Z87.440 HISTORY OF URINARY TRACT INFECTION: Status: RESOLVED | Noted: 2019-10-19 | Resolved: 2020-12-21

## 2021-01-08 ENCOUNTER — NON-APPOINTMENT (OUTPATIENT)
Age: 66
End: 2021-01-08

## 2021-01-08 DIAGNOSIS — R13.10 DYSPHAGIA, UNSPECIFIED: ICD-10-CM

## 2021-01-12 ENCOUNTER — NON-APPOINTMENT (OUTPATIENT)
Age: 66
End: 2021-01-12

## 2021-01-15 ENCOUNTER — RX RENEWAL (OUTPATIENT)
Age: 66
End: 2021-01-15

## 2021-01-20 ENCOUNTER — NON-APPOINTMENT (OUTPATIENT)
Age: 66
End: 2021-01-20

## 2021-01-29 DIAGNOSIS — Z01.818 ENCOUNTER FOR OTHER PREPROCEDURAL EXAMINATION: ICD-10-CM

## 2021-01-30 ENCOUNTER — APPOINTMENT (OUTPATIENT)
Dept: DISASTER EMERGENCY | Facility: CLINIC | Age: 66
End: 2021-01-30

## 2021-01-30 LAB — SARS-COV-2 N GENE NPH QL NAA+PROBE: NOT DETECTED

## 2021-02-02 ENCOUNTER — NON-APPOINTMENT (OUTPATIENT)
Age: 66
End: 2021-02-02

## 2021-02-02 ENCOUNTER — RESULT REVIEW (OUTPATIENT)
Age: 66
End: 2021-02-02

## 2021-02-02 ENCOUNTER — OUTPATIENT (OUTPATIENT)
Dept: OUTPATIENT SERVICES | Facility: HOSPITAL | Age: 66
LOS: 1 days | End: 2021-02-02
Payer: COMMERCIAL

## 2021-02-02 DIAGNOSIS — R13.10 DYSPHAGIA, UNSPECIFIED: ICD-10-CM

## 2021-02-02 DIAGNOSIS — K21.9 GASTRO-ESOPHAGEAL REFLUX DISEASE WITHOUT ESOPHAGITIS: ICD-10-CM

## 2021-02-02 DIAGNOSIS — Z98.89 OTHER SPECIFIED POSTPROCEDURAL STATES: Chronic | ICD-10-CM

## 2021-02-02 PROCEDURE — 92611 MOTION FLUOROSCOPY/SWALLOW: CPT

## 2021-02-02 PROCEDURE — 74230 X-RAY XM SWLNG FUNCJ C+: CPT

## 2021-02-02 PROCEDURE — 74230 X-RAY XM SWLNG FUNCJ C+: CPT | Mod: 26

## 2021-02-02 NOTE — ASSESSMENT
[FreeTextEntry1] : MODIFIED BARIUM SWALLOW STUDY\par \par Date of Report: 02/02/2021\par Date of Evaluation: 02/02/2021\par Patient Name: Aren Ivory\par YOB: 1955\par Date of Onset: ~1 year\par Primary Diagnosis: Dysphagia\par Treatment Diagnosis: Dysphagia \par Referring Physician: Dr. Breanna Luque\par \par Results/Impressions: There was no laryngeal penetration/aspiration for puree, solid, or honey thick liquid textures. There was trace laryngeal penetration with and without full retrieval on larger, consecutive cup sips of nectar thick and thin liquid textures. Laryngeal penetration was eliminated when the patient self-fed small, single cup sips.\par \par Recommendations:\par 1) The patient was advised to consume regular solids with thin liquids via SMALL, SINGLE cup sips, as tolerated.\par 2) The patient was advised to consume small bites/sips and small, single cup sips, to chew solids well, slow pacing during meals, and to maintain upright positioning during and ~30 min post meals.\par 3) Maintain strict aspiration precautions.\par 4) Maintain strict oral care\par 5) Consider swallowing therapy to maximize overall swallow function\par 6) Follow-up with referring MD as directed. \par \par History: This 65 year old male was seen today for a Modified Barium Swallow to: _x__rule out aspiration; __x_assess for diet texture change as appropriate; and/or _x__ explore positional strategies and/or compensatory techniques to eliminate aspiration. \par \par Reason for Referral: To determine patient's ability to safely tolerate an oral diet.\par \par The physician ordered this procedure because she wants the patient to meet their nutrition/hydration needs by mouth without compromising respiratory status. The patient arrived to today’s Modified Barium Swallow Study to objectively asses swallow function, rule out aspiration and assess for diet texture change as appropriate. The patient is admitting to dysphagia for ~one year marked by intermittent coughing during meals and feeling as if he needs to put “pressure” into swallowing certain foods/liquids. Upon clinician questioning, the patient is denying any change in breathing pattern during or following meals, odynophagia, feelings of pharyngeal stasis, unintentional weight loss, or any recent/recurrent pneumonia. Per chart review, the patient has undergone a GI work-up, “Patient seen in 2014 at which time he underwent both an upper endoscopy as well as a colonoscopy for GERD and for colon cancer screening. The upper endoscopy showing normal esophagus, gastritis negative for H. pylori. The colonoscopy would good colonic preparation otherwise normal exam.”\par \par Current Nutritional Intake: As per patient report, the patient consumes a regular solid with thin liquid diet at baseline. \par \par Medical History: Mr. Ivory’s medical and surgical history are significant for:\par \par Active Problems\par Angina pectoris (413.9) (I20.9)\par Atherosclerosis of coronary artery (414.00) (I25.10)\par Back pain, acute (724.5) (M54.9)\par Carotid artery plaque (433.10) (I65.29)\par Chest pain (786.50) (R07.9)\par Chronic GERD (530.81) (K21.9)\par Constipation (564.00) (K59.00)\par Diabetes mellitus (250.00) (E11.9)\par Dyslipidemia (272.4) (E78.5)\par Encounter for immunization (V03.89) (Z23)\par Encounter for screening for malignant neoplasm of colon (V76.51) (Z12.11)\par Esophageal reflux (530.81) (K21.9)\par Exposure to COVID-19 virus (V01.79) (Z20.828)\par Lightheadedness (780.4) (R42)\par Nonspecific abnormal cardiovascular function study (794.30) (R94.30)\par Numbness of right jaw (782.0) (R20.0)\par Syncope (780.2) (R55)\par UTI (urinary tract infection) (599.0) (N39.0)\par Vertigo (780.4) (R42)\par \par Past Medical History\par History of Dyslipidemia (272.4) (E78.5)\par History of diabetes mellitus (V12.29) (Z86.39)\par History of mitral valve prolapse (V12.59) (Z86.79)\par \par Past Surgical History\par History of Cath Stent Placement\par \par Current Respiratory Status: __x_ Normal ___ Tracheostomy Tube \par ASSESSMENT\par \par The patient was assessed standing in the lateral plane in the [facility], with Radiologist present.\par \par The patient was _x_ alert ___lethargic _x__cooperative ___uncooperative.\par Secretion management was __x_adequate ___inadequate. \par \par Consistencies Administered: \par Solids: _x_ Regular ___Mechanical Soft __ Soft solid _x_Puree \par Liquids: _x__ Thin _x__ Nectar Thick _x__Honey Thick \par _x_ single cup sips _x__ consecutive cup __ tsp presentations\par \par SUMMARY & IMPRESSION\par The patient demonstrated the following:\par \par Preliminary Fluoroscopy reveals:\par 1) At baseline, the patient demonstrated velopharyngeal movement that was within functional limits.\par 2) At baseline, the patient demonstrated a timely pharyngeal swallow trigger and adequate and complete hyolaryngeal elevation and excursion.\par \par Videofluoroscopic Evaluation Reveals:\par \par 1) The patient demonstrated functional oral management of puree, solid, honey thick, nectar thick, and thin liquid textures marked by adequate oral acceptance with adequate bolus collection, transfer, and AP transit time. Trace lingual and palatal residue noted subsequent to deglutition which cleared with a spontaneous re-swallow.\par 2) The patient demonstrated a mild pharyngeal dysphagia for puree, solid, and honey thick liquid textures marked by a timely pharyngeal swallow trigger with reduced base of tongue retraction, reduced epiglottic deflection, mildly reduced hyolaryngeal elevation, and reduced pharyngeal contractility resulting in trace stasis along the base of tongue, mild stasis in the vallecula, trace stasis along the posterior pharyngeal wall, and trace stasis in the pyriforms which reduced with a spontaneous re-swallow. No laryngeal penetration/aspiration observed.\par 3) The patient demonstrated a mild-moderate pharyngeal dysphagia for nectar thick and thin liquid textures marked by a delayed pharyngeal swallow trigger with reduced base of tongue retraction, reduced epiglottic deflection, mildly reduced hyolaryngeal elevation, and reduced pharyngeal contractility resulting in trace stasis along the base of tongue, trace-mild stasis in the vallecula and in the pyriforms, and trace stasis along the posterior pharyngeal wall which reduced with a spontaneous re-swallow. There was evidence of trace laryngeal penetration with and without full retrieval on larger, consecutive cup sips of nectar thick and thin liquid textures which was eliminated when the patient self-fed small, single cup sips.\par  \par Aspiration - Penetration Scale: 1- Puree, solid, honey thick liquids; 2, 3- Nectar thick and thin liquids via larger, consecutive cup sips; 1- Nectar thick and thin liquids via small, single cup sips\par \par Aspiration - Penetration Scale (Julianna et al Dysphagia 11:93-98 (April 1996), Aspiration-Penetration Scale)\par 1. Material does not enter the airway\par 2. Material enters the airway, remains above the vocal folds, and is\par ejected from the airway\par 3. Material enters the airway, remains above the vocal folds, and is not\par ejected\par 4. Material enters the airway, contacts the vocal folds, and is ejected\par from the airway\par 5. Material enters the airway, contacts the vocal folds, and is not ejected\par from the airway\par 6. Material enters the airway, passes below the vocal folds and is ejected\par into the larynx or out of the airway\par 7. Material enters the airway, passes below the vocal folds, and is not\par ejected from the trachea despite effort\par 8. Material enters the airway, passes below the vocal folds, and no effort\par is made to eject\par \par The above results and recommendations have been discussed with the patient, who verbalized full understanding.\par \par Should you have any additional concerns, please contact the Center at [phone].\par \par Ondina Haider M.S. CCC-SLP\par Speech-Language Pathologist\par [facility].

## 2021-02-06 ENCOUNTER — APPOINTMENT (OUTPATIENT)
Dept: INTERNAL MEDICINE | Facility: CLINIC | Age: 66
End: 2021-02-06
Payer: COMMERCIAL

## 2021-02-06 VITALS — SYSTOLIC BLOOD PRESSURE: 118 MMHG | DIASTOLIC BLOOD PRESSURE: 72 MMHG

## 2021-02-06 VITALS
HEART RATE: 74 BPM | WEIGHT: 140 LBS | OXYGEN SATURATION: 99 % | TEMPERATURE: 98 F | BODY MASS INDEX: 23.32 KG/M2 | RESPIRATION RATE: 16 BRPM | HEIGHT: 65 IN

## 2021-02-06 PROCEDURE — 36415 COLL VENOUS BLD VENIPUNCTURE: CPT

## 2021-02-06 PROCEDURE — 99213 OFFICE O/P EST LOW 20 MIN: CPT | Mod: 25

## 2021-02-06 PROCEDURE — 99072 ADDL SUPL MATRL&STAF TM PHE: CPT

## 2021-02-06 NOTE — PHYSICAL EXAM
[No Acute Distress] : no acute distress [Normal Sclera/Conjunctiva] : normal sclera/conjunctiva [Normal Outer Ear/Nose] : the outer ears and nose were normal in appearance [No JVD] : no jugular venous distention [No Lymphadenopathy] : no lymphadenopathy [Supple] : supple [No Respiratory Distress] : no respiratory distress  [No Accessory Muscle Use] : no accessory muscle use [Normal Rate] : normal rate  [Regular Rhythm] : with a regular rhythm [No Edema] : there was no peripheral edema [No Extremity Clubbing/Cyanosis] : no extremity clubbing/cyanosis [Soft] : abdomen soft [Non Tender] : non-tender

## 2021-02-08 LAB
ALBUMIN SERPL ELPH-MCNC: 4.6 G/DL
ALP BLD-CCNC: 65 U/L
ALT SERPL-CCNC: 23 U/L
ANION GAP SERPL CALC-SCNC: 14 MMOL/L
AST SERPL-CCNC: 20 U/L
BASOPHILS # BLD AUTO: 0.03 K/UL
BASOPHILS NFR BLD AUTO: 0.7 %
BILIRUB SERPL-MCNC: 0.9 MG/DL
BUN SERPL-MCNC: 22 MG/DL
CALCIUM SERPL-MCNC: 9.5 MG/DL
CHLORIDE SERPL-SCNC: 103 MMOL/L
CHOLEST SERPL-MCNC: 138 MG/DL
CO2 SERPL-SCNC: 22 MMOL/L
CREAT SERPL-MCNC: 0.93 MG/DL
EOSINOPHIL # BLD AUTO: 0.11 K/UL
EOSINOPHIL NFR BLD AUTO: 2.6 %
ESTIMATED AVERAGE GLUCOSE: 140 MG/DL
GLUCOSE SERPL-MCNC: 112 MG/DL
HBA1C MFR BLD HPLC: 6.5 %
HCT VFR BLD CALC: 43.1 %
HDLC SERPL-MCNC: 39 MG/DL
HGB BLD-MCNC: 13.9 G/DL
IMM GRANULOCYTES NFR BLD AUTO: 0.2 %
LDLC SERPL CALC-MCNC: 84 MG/DL
LYMPHOCYTES # BLD AUTO: 1.39 K/UL
LYMPHOCYTES NFR BLD AUTO: 33.3 %
MAN DIFF?: NORMAL
MCHC RBC-ENTMCNC: 30 PG
MCHC RBC-ENTMCNC: 32.3 GM/DL
MCV RBC AUTO: 93.1 FL
MONOCYTES # BLD AUTO: 0.33 K/UL
MONOCYTES NFR BLD AUTO: 7.9 %
NEUTROPHILS # BLD AUTO: 2.31 K/UL
NEUTROPHILS NFR BLD AUTO: 55.3 %
NONHDLC SERPL-MCNC: 100 MG/DL
PLATELET # BLD AUTO: 226 K/UL
POTASSIUM SERPL-SCNC: 4.3 MMOL/L
PROT SERPL-MCNC: 7 G/DL
RBC # BLD: 4.63 M/UL
RBC # FLD: 11.9 %
SODIUM SERPL-SCNC: 139 MMOL/L
TRIGL SERPL-MCNC: 77 MG/DL
TSH SERPL-ACNC: 1.04 UIU/ML
WBC # FLD AUTO: 4.18 K/UL

## 2021-02-24 ENCOUNTER — NON-APPOINTMENT (OUTPATIENT)
Age: 66
End: 2021-02-24

## 2021-03-01 ENCOUNTER — RX RENEWAL (OUTPATIENT)
Age: 66
End: 2021-03-01

## 2021-03-03 ENCOUNTER — OUTPATIENT (OUTPATIENT)
Dept: OUTPATIENT SERVICES | Facility: HOSPITAL | Age: 66
LOS: 1 days | Discharge: HOME | End: 2021-03-03

## 2021-03-03 ENCOUNTER — APPOINTMENT (OUTPATIENT)
Dept: SPEECH THERAPY | Facility: CLINIC | Age: 66
End: 2021-03-03

## 2021-03-03 DIAGNOSIS — Z98.89 OTHER SPECIFIED POSTPROCEDURAL STATES: Chronic | ICD-10-CM

## 2021-03-07 ENCOUNTER — EMERGENCY (EMERGENCY)
Facility: HOSPITAL | Age: 66
LOS: 1 days | Discharge: HOME | End: 2021-03-07
Attending: EMERGENCY MEDICINE | Admitting: EMERGENCY MEDICINE
Payer: COMMERCIAL

## 2021-03-07 VITALS
RESPIRATION RATE: 18 BRPM | HEART RATE: 90 BPM | DIASTOLIC BLOOD PRESSURE: 66 MMHG | OXYGEN SATURATION: 97 % | TEMPERATURE: 98 F | SYSTOLIC BLOOD PRESSURE: 147 MMHG

## 2021-03-07 VITALS
RESPIRATION RATE: 18 BRPM | WEIGHT: 139.99 LBS | SYSTOLIC BLOOD PRESSURE: 143 MMHG | TEMPERATURE: 97 F | OXYGEN SATURATION: 100 % | HEIGHT: 66 IN | HEART RATE: 89 BPM | DIASTOLIC BLOOD PRESSURE: 85 MMHG

## 2021-03-07 DIAGNOSIS — Z98.89 OTHER SPECIFIED POSTPROCEDURAL STATES: Chronic | ICD-10-CM

## 2021-03-07 LAB
ALBUMIN SERPL ELPH-MCNC: 4.2 G/DL — SIGNIFICANT CHANGE UP (ref 3.3–5)
ALP SERPL-CCNC: 93 U/L — SIGNIFICANT CHANGE UP (ref 40–120)
ALT FLD-CCNC: 28 U/L — SIGNIFICANT CHANGE UP (ref 12–78)
ANION GAP SERPL CALC-SCNC: 8 MMOL/L — SIGNIFICANT CHANGE UP (ref 5–17)
APTT BLD: 28.7 SEC — SIGNIFICANT CHANGE UP (ref 27.5–35.5)
AST SERPL-CCNC: 23 U/L — SIGNIFICANT CHANGE UP (ref 15–37)
BASOPHILS # BLD AUTO: 0.04 K/UL — SIGNIFICANT CHANGE UP (ref 0–0.2)
BASOPHILS NFR BLD AUTO: 0.7 % — SIGNIFICANT CHANGE UP (ref 0–2)
BILIRUB SERPL-MCNC: 0.9 MG/DL — SIGNIFICANT CHANGE UP (ref 0.2–1.2)
BUN SERPL-MCNC: 25 MG/DL — HIGH (ref 7–23)
CALCIUM SERPL-MCNC: 8.9 MG/DL — SIGNIFICANT CHANGE UP (ref 8.5–10.1)
CHLORIDE SERPL-SCNC: 104 MMOL/L — SIGNIFICANT CHANGE UP (ref 96–108)
CO2 SERPL-SCNC: 27 MMOL/L — SIGNIFICANT CHANGE UP (ref 22–31)
CREAT SERPL-MCNC: 1.1 MG/DL — SIGNIFICANT CHANGE UP (ref 0.5–1.3)
EOSINOPHIL # BLD AUTO: 0.21 K/UL — SIGNIFICANT CHANGE UP (ref 0–0.5)
EOSINOPHIL NFR BLD AUTO: 3.6 % — SIGNIFICANT CHANGE UP (ref 0–6)
GLUCOSE SERPL-MCNC: 165 MG/DL — HIGH (ref 70–99)
HCT VFR BLD CALC: 46.2 % — SIGNIFICANT CHANGE UP (ref 39–50)
HGB BLD-MCNC: 15.4 G/DL — SIGNIFICANT CHANGE UP (ref 13–17)
IMM GRANULOCYTES NFR BLD AUTO: 0.5 % — SIGNIFICANT CHANGE UP (ref 0–1.5)
INR BLD: 1.06 RATIO — SIGNIFICANT CHANGE UP (ref 0.88–1.16)
LYMPHOCYTES # BLD AUTO: 1.02 K/UL — SIGNIFICANT CHANGE UP (ref 1–3.3)
LYMPHOCYTES # BLD AUTO: 17.4 % — SIGNIFICANT CHANGE UP (ref 13–44)
MAGNESIUM SERPL-MCNC: 2.1 MG/DL — SIGNIFICANT CHANGE UP (ref 1.6–2.6)
MCHC RBC-ENTMCNC: 30.1 PG — SIGNIFICANT CHANGE UP (ref 27–34)
MCHC RBC-ENTMCNC: 33.3 GM/DL — SIGNIFICANT CHANGE UP (ref 32–36)
MCV RBC AUTO: 90.4 FL — SIGNIFICANT CHANGE UP (ref 80–100)
MONOCYTES # BLD AUTO: 0.44 K/UL — SIGNIFICANT CHANGE UP (ref 0–0.9)
MONOCYTES NFR BLD AUTO: 7.5 % — SIGNIFICANT CHANGE UP (ref 2–14)
NEUTROPHILS # BLD AUTO: 4.13 K/UL — SIGNIFICANT CHANGE UP (ref 1.8–7.4)
NEUTROPHILS NFR BLD AUTO: 70.3 % — SIGNIFICANT CHANGE UP (ref 43–77)
NRBC # BLD: 0 /100 WBCS — SIGNIFICANT CHANGE UP (ref 0–0)
NT-PROBNP SERPL-SCNC: 33 PG/ML — SIGNIFICANT CHANGE UP (ref 0–125)
PLATELET # BLD AUTO: 235 K/UL — SIGNIFICANT CHANGE UP (ref 150–400)
POTASSIUM SERPL-MCNC: 4 MMOL/L — SIGNIFICANT CHANGE UP (ref 3.5–5.3)
POTASSIUM SERPL-SCNC: 4 MMOL/L — SIGNIFICANT CHANGE UP (ref 3.5–5.3)
PROT SERPL-MCNC: 8.1 G/DL — SIGNIFICANT CHANGE UP (ref 6–8.3)
PROTHROM AB SERPL-ACNC: 12.4 SEC — SIGNIFICANT CHANGE UP (ref 10.6–13.6)
RBC # BLD: 5.11 M/UL — SIGNIFICANT CHANGE UP (ref 4.2–5.8)
RBC # FLD: 11.9 % — SIGNIFICANT CHANGE UP (ref 10.3–14.5)
SODIUM SERPL-SCNC: 139 MMOL/L — SIGNIFICANT CHANGE UP (ref 135–145)
TROPONIN I SERPL-MCNC: <.015 NG/ML — SIGNIFICANT CHANGE UP (ref 0.01–0.04)
WBC # BLD: 5.87 K/UL — SIGNIFICANT CHANGE UP (ref 3.8–10.5)
WBC # FLD AUTO: 5.87 K/UL — SIGNIFICANT CHANGE UP (ref 3.8–10.5)

## 2021-03-07 PROCEDURE — 84484 ASSAY OF TROPONIN QUANT: CPT

## 2021-03-07 PROCEDURE — 71045 X-RAY EXAM CHEST 1 VIEW: CPT

## 2021-03-07 PROCEDURE — 99284 EMERGENCY DEPT VISIT MOD MDM: CPT | Mod: 25

## 2021-03-07 PROCEDURE — 36415 COLL VENOUS BLD VENIPUNCTURE: CPT

## 2021-03-07 PROCEDURE — 85730 THROMBOPLASTIN TIME PARTIAL: CPT

## 2021-03-07 PROCEDURE — 71045 X-RAY EXAM CHEST 1 VIEW: CPT | Mod: 26

## 2021-03-07 PROCEDURE — 93005 ELECTROCARDIOGRAM TRACING: CPT

## 2021-03-07 PROCEDURE — 85610 PROTHROMBIN TIME: CPT

## 2021-03-07 PROCEDURE — 93010 ELECTROCARDIOGRAM REPORT: CPT

## 2021-03-07 PROCEDURE — 99285 EMERGENCY DEPT VISIT HI MDM: CPT

## 2021-03-07 PROCEDURE — 83880 ASSAY OF NATRIURETIC PEPTIDE: CPT

## 2021-03-07 PROCEDURE — 80053 COMPREHEN METABOLIC PANEL: CPT

## 2021-03-07 PROCEDURE — 83735 ASSAY OF MAGNESIUM: CPT

## 2021-03-07 PROCEDURE — 85025 COMPLETE CBC W/AUTO DIFF WBC: CPT

## 2021-03-07 NOTE — ED PROVIDER NOTE - CARE PROVIDER_API CALL
Viral Coy)  Cardiovascular Disease; Internal Medicine  [address] [place] [address]  Phone: [phone]  Fax: [phone]  Follow Up Time:

## 2021-03-07 NOTE — ED PROVIDER NOTE - OBJECTIVE STATEMENT
64 yo male with hx htn, cad with stent bibems due to syncope episode at work. Patient states he was at work, and was ate a big sandwich. He didn't drink much water. Patient states he felt nauseous, then went to bathroom and had BM. Patient then sat in a chair and reportedly passed out for 30 seconds. Patient didn't fall out of chair. denies any chest pain or sob  denies fever or chills  currently asymptomatc  patient reports similar episode in past, and was told it was vasovagal

## 2021-03-07 NOTE — ED ADULT NURSE NOTE - NSIMPLEMENTINTERV_GEN_ALL_ED
Implemented All Fall Risk Interventions:  [place] to call system. Call bell, personal items and telephone within reach. Instruct patient to call for assistance. Room bathroom lighting operational. Non-slip footwear when patient is off stretcher. Physically safe environment: no spills, clutter or unnecessary equipment. Stretcher in lowest position, wheels locked, appropriate side rails in place. Provide visual cue, wrist band, yellow gown, etc. Monitor gait and stability. Monitor for mental status changes and reorient to person, place, and time. Review medications for side effects contributing to fall risk. Reinforce activity limits and safety measures with patient and family.

## 2021-03-07 NOTE — ED PROVIDER NOTE - CLINICAL SUMMARY MEDICAL DECISION MAKING FREE TEXT BOX
64 yo male with syncopal episode today at work  currently asymptomatic  hx same in past  labs, ekg, monitor

## 2021-03-07 NOTE — ED PROVIDER NOTE - PROGRESS NOTE DETAILS
patient remains asymptomatic  results d/w patient and need for cardiology followup  patient agrees with plan and verbalized understanding  -md cuate

## 2021-03-07 NOTE — ED ADULT NURSE NOTE - OBJECTIVE STATEMENT
Patient states he ate lunch at work, then had a bowel movement, when he returned to his desk he felt dizzy, he sent fot the supervisor, when the supervisor arrived, he had passed out in his chair, he was out about 30 seconds, now awake and alert

## 2021-03-07 NOTE — ED PROVIDER NOTE - PATIENT PORTAL LINK FT
You can access the FollowMyHealth Patient Portal offered by [facility] by registering at the following website: [email or web address]. By joining [employer]’s FollowMyHealth portal, you will also be able to view your health information using other applications (apps) compatible with our system.

## 2021-03-10 ENCOUNTER — APPOINTMENT (OUTPATIENT)
Dept: SPEECH THERAPY | Facility: CLINIC | Age: 66
End: 2021-03-10

## 2021-03-16 DIAGNOSIS — R13.12 DYSPHAGIA, OROPHARYNGEAL PHASE: ICD-10-CM

## 2021-04-12 ENCOUNTER — RX RENEWAL (OUTPATIENT)
Age: 66
End: 2021-04-12

## 2021-04-13 ENCOUNTER — OUTPATIENT (OUTPATIENT)
Dept: OUTPATIENT SERVICES | Facility: HOSPITAL | Age: 66
LOS: 1 days | End: 2021-04-13
Payer: COMMERCIAL

## 2021-04-13 VITALS
DIASTOLIC BLOOD PRESSURE: 83 MMHG | WEIGHT: 141.1 LBS | HEART RATE: 81 BPM | OXYGEN SATURATION: 97 % | RESPIRATION RATE: 16 BRPM | HEIGHT: 66 IN | SYSTOLIC BLOOD PRESSURE: 124 MMHG | TEMPERATURE: 97 F

## 2021-04-13 DIAGNOSIS — Z01.818 ENCOUNTER FOR OTHER PREPROCEDURAL EXAMINATION: ICD-10-CM

## 2021-04-13 DIAGNOSIS — S43.422A SPRAIN OF LEFT ROTATOR CUFF CAPSULE, INITIAL ENCOUNTER: ICD-10-CM

## 2021-04-13 DIAGNOSIS — Z98.89 OTHER SPECIFIED POSTPROCEDURAL STATES: Chronic | ICD-10-CM

## 2021-04-13 LAB
ANION GAP SERPL CALC-SCNC: 4 MMOL/L — LOW (ref 5–17)
BUN SERPL-MCNC: 17 MG/DL — SIGNIFICANT CHANGE UP (ref 7–23)
CALCIUM SERPL-MCNC: 9.3 MG/DL — SIGNIFICANT CHANGE UP (ref 8.5–10.1)
CHLORIDE SERPL-SCNC: 105 MMOL/L — SIGNIFICANT CHANGE UP (ref 96–108)
CO2 SERPL-SCNC: 31 MMOL/L — SIGNIFICANT CHANGE UP (ref 22–31)
CREAT SERPL-MCNC: 0.91 MG/DL — SIGNIFICANT CHANGE UP (ref 0.5–1.3)
GLUCOSE SERPL-MCNC: 115 MG/DL — HIGH (ref 70–99)
HCT VFR BLD CALC: 42.2 % — SIGNIFICANT CHANGE UP (ref 39–50)
HGB BLD-MCNC: 13.6 G/DL — SIGNIFICANT CHANGE UP (ref 13–17)
MCHC RBC-ENTMCNC: 29.3 PG — SIGNIFICANT CHANGE UP (ref 27–34)
MCHC RBC-ENTMCNC: 32.2 GM/DL — SIGNIFICANT CHANGE UP (ref 32–36)
MCV RBC AUTO: 90.9 FL — SIGNIFICANT CHANGE UP (ref 80–100)
NRBC # BLD: 0 /100 WBCS — SIGNIFICANT CHANGE UP (ref 0–0)
PLATELET # BLD AUTO: 247 K/UL — SIGNIFICANT CHANGE UP (ref 150–400)
POTASSIUM SERPL-MCNC: 4.1 MMOL/L — SIGNIFICANT CHANGE UP (ref 3.5–5.3)
POTASSIUM SERPL-SCNC: 4.1 MMOL/L — SIGNIFICANT CHANGE UP (ref 3.5–5.3)
RBC # BLD: 4.64 M/UL — SIGNIFICANT CHANGE UP (ref 4.2–5.8)
RBC # FLD: 11.9 % — SIGNIFICANT CHANGE UP (ref 10.3–14.5)
SODIUM SERPL-SCNC: 140 MMOL/L — SIGNIFICANT CHANGE UP (ref 135–145)
WBC # BLD: 5.01 K/UL — SIGNIFICANT CHANGE UP (ref 3.8–10.5)
WBC # FLD AUTO: 5.01 K/UL — SIGNIFICANT CHANGE UP (ref 3.8–10.5)

## 2021-04-13 PROCEDURE — 83036 HEMOGLOBIN GLYCOSYLATED A1C: CPT

## 2021-04-13 PROCEDURE — G0463: CPT

## 2021-04-13 PROCEDURE — 80048 BASIC METABOLIC PNL TOTAL CA: CPT

## 2021-04-13 PROCEDURE — 36415 COLL VENOUS BLD VENIPUNCTURE: CPT

## 2021-04-13 PROCEDURE — 85027 COMPLETE CBC AUTOMATED: CPT

## 2021-04-13 RX ORDER — LOSARTAN POTASSIUM 100 MG/1
12.5 TABLET, FILM COATED ORAL
Qty: 0 | Refills: 0 | DISCHARGE

## 2021-04-13 RX ORDER — METFORMIN HYDROCHLORIDE 850 MG/1
1500 TABLET ORAL
Qty: 0 | Refills: 0 | DISCHARGE

## 2021-04-13 NOTE — H&P PST ADULT - HISTORY OF PRESENT ILLNESS
64 yo male with HLD, T2D and CAD with a JERSON in the LAD (2012), on ASA daily, presents to PST scheduled for a slipped and fell in the snow feb 18th and sustained ligament injury. C/O left shoulder pain. Denies LOC. S/P syncopal episode March 7, presents to ED, noted in [place]. Work up normal. Discharged and instructed to F/U with PCP. Reports H/O syncopal episodes in the past. Seen by neurologist and was dx with vertigo. Denies CVA and TIAs. Scheduled for medical and cardiology clearance. Denies CP, SOB and Palps.  64 yo male with HLD, T2D and CAD with a JERSON in the LAD (2012), on ASA daily, presents to PST scheduled for a left shoulder arthroscopy on 4/23 with Dr. Alexander. Patient slipped and fell in the snow feb 18th and sustained ligament injury. C/O left shoulder pain. Denies LOC. S/P syncopal episode March 7, presents to ED, noted in [place]. Work up normal. Discharged and instructed to F/U with PCP. Reports H/O syncopal episodes in the past. Seen by neurologist and was dx with vertigo. Denies CVA and TIAs. Scheduled for medical and cardiology clearance. Denies CP, SOB and Palps.

## 2021-04-13 NOTE — H&P PST ADULT - NSICDXPASTMEDICALHX_GEN_ALL_CORE_FT
PAST MEDICAL HISTORY:  CAD (coronary artery disease)     Diabetes mellitus type II     GERD (gastroesophageal reflux disease)     H/O hyperlipidemia     History of seasonal allergies      PAST MEDICAL HISTORY:  CAD (coronary artery disease)     Diabetes mellitus type II     GERD (gastroesophageal reflux disease)     H/O hyperlipidemia     H/O syncope March 7    History of seasonal allergies     Sprain of left rotator cuff capsule, initial encounter

## 2021-04-13 NOTE — H&P PST ADULT - NSICDXPROBLEM_GEN_ALL_CORE_FT
PROBLEM DIAGNOSES  Problem: Sprain of left rotator cuff capsule, initial encounter  Assessment and Plan: scheduled for a left shoulder arthroscopy on 4/23 with Dr. Alexander.    Problem: Pre-op evaluation  Assessment and Plan: Labs - CBC, BMP and A1c. CXR  and EKG on chart from March. To make appt for COVID PCR 48-72 before DOS.   MC with Dr. Delaney  Pre op and Hibiclens instructions reviewed and given. Take routine am meds DOS with sip of water. Instructed to hold and/or avoid other NSAIDs and OTC supplements. Tylenol is ok. Verbalized understanding     Problem: Stented coronary artery  Assessment and Plan: CC with Dr. Coy. Continue ASA. Verbalized understanding.

## 2021-04-14 ENCOUNTER — APPOINTMENT (OUTPATIENT)
Dept: SPEECH THERAPY | Facility: CLINIC | Age: 66
End: 2021-04-14

## 2021-04-14 LAB
A1C WITH ESTIMATED AVERAGE GLUCOSE RESULT: 6.7 % — HIGH (ref 4–5.6)
ESTIMATED AVERAGE GLUCOSE: 146 MG/DL — HIGH (ref 68–114)

## 2021-04-17 ENCOUNTER — APPOINTMENT (OUTPATIENT)
Dept: INTERNAL MEDICINE | Facility: CLINIC | Age: 66
End: 2021-04-17
Payer: COMMERCIAL

## 2021-04-17 VITALS — SYSTOLIC BLOOD PRESSURE: 118 MMHG | DIASTOLIC BLOOD PRESSURE: 72 MMHG

## 2021-04-17 PROCEDURE — 99213 OFFICE O/P EST LOW 20 MIN: CPT

## 2021-04-17 PROCEDURE — 99072 ADDL SUPL MATRL&STAF TM PHE: CPT

## 2021-04-19 ENCOUNTER — APPOINTMENT (OUTPATIENT)
Dept: CARDIOLOGY | Facility: CLINIC | Age: 66
End: 2021-04-19
Payer: COMMERCIAL

## 2021-04-19 ENCOUNTER — NON-APPOINTMENT (OUTPATIENT)
Age: 66
End: 2021-04-19

## 2021-04-19 VITALS
HEIGHT: 65 IN | DIASTOLIC BLOOD PRESSURE: 86 MMHG | WEIGHT: 144 LBS | HEART RATE: 68 BPM | OXYGEN SATURATION: 98 % | BODY MASS INDEX: 23.99 KG/M2 | SYSTOLIC BLOOD PRESSURE: 142 MMHG

## 2021-04-19 DIAGNOSIS — S43.422A SPRAIN OF LEFT ROTATOR CUFF CAPSULE, INITIAL ENCOUNTER: ICD-10-CM

## 2021-04-19 DIAGNOSIS — Z95.5 PRESENCE OF CORONARY ANGIOPLASTY IMPLANT AND GRAFT: ICD-10-CM

## 2021-04-19 DIAGNOSIS — Z01.818 ENCOUNTER FOR OTHER PREPROCEDURAL EXAMINATION: ICD-10-CM

## 2021-04-19 PROCEDURE — 78452 HT MUSCLE IMAGE SPECT MULT: CPT

## 2021-04-19 PROCEDURE — 99214 OFFICE O/P EST MOD 30 MIN: CPT

## 2021-04-19 PROCEDURE — 99072 ADDL SUPL MATRL&STAF TM PHE: CPT

## 2021-04-19 PROCEDURE — 93000 ELECTROCARDIOGRAM COMPLETE: CPT

## 2021-04-19 PROCEDURE — A9500: CPT

## 2021-04-19 PROCEDURE — 93015 CV STRESS TEST SUPVJ I&R: CPT

## 2021-04-20 NOTE — HISTORY OF PRESENT ILLNESS
[FreeTextEntry1] : I saw Aren Ivory in the office today for a follow up visit in anticipation of left shoulder surgery.. He is a 65-year-old male who underwent cardiac catheterization following an abnormal stress test in June 2012. He had a 99% LAD lesion that was treated with a drug-eluting stent. The other arteries were normal. Left ventricular end-diastolic pressure was 25. Also has diabetes and high cholesterol. He is exercising regularly and has no symptoms however he does get slightly lightheaded during exercise. His metoprolol was decreased from 25mg to 12.5 mg a day. He also is getting heartburn and saw his Gastroenterologist. \par \par 5/20, patient episode of vertigo and then passed out.. He went to the emergency room where his EKG, blood work including troponin were negative. He had no chest pain or shortness of breath. A CAT scan showed a hyperlucent area in the right frontal horn that was felt to possibly represent a prior infarct. I looked at his previous CAT scan and MRI scan and this was not commented on.Went to see the neurologist felt it was vertigo and not a stroke. A carotid Doppler performed 5/20 showed a mild plaque without change.\par \par Since that time the patient has had 2 more episodes where he felt he would pass out. Each episode got better when he laid down. Associated with some diaphoresis but no chest pain, shortness of breath, palpitation.  He was having orthostatic hypotension which the losartan was exacerbating.  Erendira was stopped and his had no further events.\par \par Blood work 11/20  demonstrated a cholesterol of 127, triglycerides 73, HDL 43, and LDL 69. A1c was 6.6... Chemistry and CBC were unremarkable.\par \par The patient underwent a nuclear stress test 6/14 that showed no ischemia and a workload of 11 mets.\par \par  He went for a regular stress test today which showed no ischemia at workload of 10 Mets. Echocardiogram 2017 was unremarkable.\par \par ECG shows sinus rhythm without change. Stress test 4/21 demonstrates no ischemia at 10 mets.\par

## 2021-04-20 NOTE — PHYSICAL EXAM
[General Appearance - Well Developed] : well developed [Normal Appearance] : normal appearance [Well Groomed] : well groomed [General Appearance - Well Nourished] : well nourished [No Deformities] : no deformities [General Appearance - In No Acute Distress] : no acute distress [Normal Conjunctiva] : the conjunctiva exhibited no abnormalities [Normal Oral Mucosa] : normal oral mucosa [Normal Jugular Venous A Waves Present] : normal jugular venous A waves present [No Jugular Venous Dyson A Waves] : no jugular venous dyson A waves [Normal Jugular Venous V Waves Present] : normal jugular venous V waves present [Respiration, Rhythm And Depth] : normal respiratory rhythm and effort [Exaggerated Use Of Accessory Muscles For Inspiration] : no accessory muscle use [Auscultation Breath Sounds / Voice Sounds] : lungs were clear to auscultation bilaterally [Bowel Sounds] : normal bowel sounds [Abdomen Soft] : soft [Abdomen Tenderness] : non-tender [Abnormal Walk] : normal gait [Gait - Sufficient For Exercise Testing] : the gait was sufficient for exercise testing [Nail Clubbing] : no clubbing of the fingernails [Cyanosis, Localized] : no localized cyanosis [Skin Turgor] : normal skin turgor [Skin Color & Pigmentation] : normal skin color and pigmentation [] : no rash [Oriented To Time, Place, And Person] : oriented to person, place, and time [Impaired Insight] : insight and judgment were intact [No Anxiety] : not feeling anxious [Normal Rate] : normal [Normal S1] : normal S1 [Normal S2] : normal S2 [No Murmur] : no murmurs heard [2+] : left 2+ [1+] : left 1+ [No Abnormalities] : the abdominal aorta was not enlarged and no bruit was heard [No Pitting Edema] : no pitting edema present [S3] : no S3 [S4] : no S4 [Right Carotid Bruit] : no bruit heard over the right carotid [Left Carotid Bruit] : no bruit heard over the left carotid [Right Femoral Bruit] : no bruit heard over the right femoral artery [Left Femoral Bruit] : no bruit heard over the left femoral artery

## 2021-04-20 NOTE — REASON FOR VISIT
[Follow-Up - Clinic] : a clinic follow-up of [Chest Pain] : chest pain [Coronary Artery Disease] : coronary artery disease [Hyperlipidemia] : hyperlipidemia [FreeTextEntry1] : Diabetes, vertigo, Preop Exam

## 2021-04-20 NOTE — DISCUSSION/SUMMARY
[FreeTextEntry1] : Clinically the patient is doing well without any signs or symptoms of active heart disease.  On his medication his blood sugar and cholesterol have been well controlled.  His stress test today showed no ischemia at workload of 10 METS.\par \par The patient's cardiac status is stable and he represents a satisfactory candidate for the planned left shoulder surgery.  No special precautions other than routine hemodynamic monitoring should be required.

## 2021-04-21 ENCOUNTER — OUTPATIENT (OUTPATIENT)
Dept: OUTPATIENT SERVICES | Facility: HOSPITAL | Age: 66
LOS: 1 days | End: 2021-04-21
Payer: COMMERCIAL

## 2021-04-21 DIAGNOSIS — Z20.828 CONTACT WITH AND (SUSPECTED) EXPOSURE TO OTHER VIRAL COMMUNICABLE DISEASES: ICD-10-CM

## 2021-04-21 DIAGNOSIS — Z98.89 OTHER SPECIFIED POSTPROCEDURAL STATES: Chronic | ICD-10-CM

## 2021-04-21 LAB — SARS-COV-2 RNA SPEC QL NAA+PROBE: SIGNIFICANT CHANGE UP

## 2021-04-21 PROCEDURE — U0003: CPT

## 2021-04-21 PROCEDURE — U0005: CPT

## 2021-04-22 ENCOUNTER — TRANSCRIPTION ENCOUNTER (OUTPATIENT)
Age: 66
End: 2021-04-22

## 2021-04-22 NOTE — ASU PATIENT PROFILE, ADULT - PMH
CAD (coronary artery disease)    Diabetes mellitus type II    GERD (gastroesophageal reflux disease)    H/O hyperlipidemia    H/O syncope  March 7  History of seasonal allergies    Sprain of left rotator cuff capsule, initial encounter

## 2021-04-23 ENCOUNTER — OUTPATIENT (OUTPATIENT)
Dept: OUTPATIENT SERVICES | Facility: HOSPITAL | Age: 66
LOS: 1 days | End: 2021-04-23
Payer: COMMERCIAL

## 2021-04-23 ENCOUNTER — RESULT REVIEW (OUTPATIENT)
Age: 66
End: 2021-04-23

## 2021-04-23 VITALS
DIASTOLIC BLOOD PRESSURE: 72 MMHG | HEART RATE: 67 BPM | TEMPERATURE: 98 F | SYSTOLIC BLOOD PRESSURE: 124 MMHG | RESPIRATION RATE: 16 BRPM | OXYGEN SATURATION: 97 %

## 2021-04-23 VITALS
RESPIRATION RATE: 14 BRPM | WEIGHT: 141.1 LBS | TEMPERATURE: 98 F | SYSTOLIC BLOOD PRESSURE: 112 MMHG | OXYGEN SATURATION: 100 % | HEIGHT: 66 IN | DIASTOLIC BLOOD PRESSURE: 79 MMHG | HEART RATE: 68 BPM

## 2021-04-23 DIAGNOSIS — Z01.818 ENCOUNTER FOR OTHER PREPROCEDURAL EXAMINATION: ICD-10-CM

## 2021-04-23 DIAGNOSIS — Z98.89 OTHER SPECIFIED POSTPROCEDURAL STATES: Chronic | ICD-10-CM

## 2021-04-23 DIAGNOSIS — S43.422A SPRAIN OF LEFT ROTATOR CUFF CAPSULE, INITIAL ENCOUNTER: ICD-10-CM

## 2021-04-23 PROCEDURE — C1713: CPT

## 2021-04-23 PROCEDURE — 88304 TISSUE EXAM BY PATHOLOGIST: CPT | Mod: 26

## 2021-04-23 PROCEDURE — 29827 SHO ARTHRS SRG RT8TR CUF RPR: CPT | Mod: LT

## 2021-04-23 PROCEDURE — 29826 SHO ARTHRS SRG DECOMPRESSION: CPT | Mod: LT

## 2021-04-23 PROCEDURE — 88304 TISSUE EXAM BY PATHOLOGIST: CPT

## 2021-04-23 PROCEDURE — 82962 GLUCOSE BLOOD TEST: CPT

## 2021-04-23 RX ORDER — MORPHINE SULFATE 50 MG/1
4 CAPSULE, EXTENDED RELEASE ORAL ONCE
Refills: 0 | Status: DISCONTINUED | OUTPATIENT
Start: 2021-04-23 | End: 2021-04-23

## 2021-04-23 RX ORDER — ONDANSETRON 8 MG/1
4 TABLET, FILM COATED ORAL ONCE
Refills: 0 | Status: DISCONTINUED | OUTPATIENT
Start: 2021-04-23 | End: 2021-04-23

## 2021-04-23 RX ORDER — METFORMIN HYDROCHLORIDE 850 MG/1
1 TABLET ORAL
Qty: 0 | Refills: 0 | DISCHARGE

## 2021-04-23 RX ORDER — SODIUM CHLORIDE 9 MG/ML
1000 INJECTION, SOLUTION INTRAVENOUS
Refills: 0 | Status: DISCONTINUED | OUTPATIENT
Start: 2021-04-23 | End: 2021-04-23

## 2021-04-23 RX ORDER — HYDROMORPHONE HYDROCHLORIDE 2 MG/ML
1 INJECTION INTRAMUSCULAR; INTRAVENOUS; SUBCUTANEOUS
Refills: 0 | Status: DISCONTINUED | OUTPATIENT
Start: 2021-04-23 | End: 2021-04-23

## 2021-04-23 RX ORDER — CEFAZOLIN SODIUM 1 G
2000 VIAL (EA) INJECTION ONCE
Refills: 0 | Status: COMPLETED | OUTPATIENT
Start: 2021-04-23 | End: 2021-04-23

## 2021-04-23 RX ORDER — METFORMIN HYDROCHLORIDE 850 MG/1
2 TABLET ORAL
Qty: 0 | Refills: 0 | DISCHARGE

## 2021-04-23 RX ORDER — FAMOTIDINE 10 MG/ML
1 INJECTION INTRAVENOUS
Qty: 0 | Refills: 0 | DISCHARGE

## 2021-04-23 RX ORDER — HYDROMORPHONE HYDROCHLORIDE 2 MG/ML
0.5 INJECTION INTRAMUSCULAR; INTRAVENOUS; SUBCUTANEOUS
Refills: 0 | Status: DISCONTINUED | OUTPATIENT
Start: 2021-04-23 | End: 2021-04-23

## 2021-04-23 RX ADMIN — SODIUM CHLORIDE 60 MILLILITER(S): 9 INJECTION, SOLUTION INTRAVENOUS at 07:02

## 2021-04-23 RX ADMIN — SODIUM CHLORIDE 75 MILLILITER(S): 9 INJECTION, SOLUTION INTRAVENOUS at 09:48

## 2021-04-23 NOTE — BRIEF OPERATIVE NOTE - NSICDXBRIEFPOSTOP_GEN_ALL_CORE_FT
POST-OP DIAGNOSIS:  Complete rotator cuff tear of left shoulder 23-Apr-2021 09:21:15  George Cortes

## 2021-04-23 NOTE — BRIEF OPERATIVE NOTE - NSICDXBRIEFPROCEDURE_GEN_ALL_CORE_FT
PROCEDURES:  Arthroscopic decompression of left shoulder with rotator cuff repair 23-Apr-2021 09:20:35  George Cortes

## 2021-04-23 NOTE — ASU DISCHARGE PLAN (ADULT/PEDIATRIC) - ASU DC SPECIAL INSTRUCTIONSFT
Follow up with Dr Alexander in 7-10 days. Call office for appointment. Take medications as prescribed. Keep dressing clean, dry, and intact. Rest, ice, and elevate affected extremity.    Non weightbearing left upper extremity in sling

## 2021-04-23 NOTE — ASU DISCHARGE PLAN (ADULT/PEDIATRIC) - CARE PROVIDER_API CALL
Marty Alexander)  Orthopaedic Surgery  [address], [address]  [place] [address]  Phone: [phone]  Fax: [phone]  Follow Up Time:

## 2021-04-23 NOTE — BRIEF OPERATIVE NOTE - NSICDXBRIEFPREOP_GEN_ALL_CORE_FT
PRE-OP DIAGNOSIS:  Complete rotator cuff tear of left shoulder 23-Apr-2021 09:20:52  George Cortes

## 2021-04-23 NOTE — ASU PREOP CHECKLIST - SKIN PREP
Acceptable eye movement; lids with acceptable appearance and movement; conjunctiva clear; iris acceptable shape and color; cornea clear; pupils equally round and react to light. Pupil red reflexes present and equal.
done

## 2021-04-26 LAB — SURGICAL PATHOLOGY STUDY: SIGNIFICANT CHANGE UP

## 2021-05-11 PROBLEM — S43.422A SPRAIN OF LEFT ROTATOR CUFF CAPSULE, INITIAL ENCOUNTER: Chronic | Status: ACTIVE | Noted: 2021-04-19

## 2021-05-11 PROBLEM — Z87.898 PERSONAL HISTORY OF OTHER SPECIFIED CONDITIONS: Chronic | Status: ACTIVE | Noted: 2021-04-19

## 2021-05-26 ENCOUNTER — RX RENEWAL (OUTPATIENT)
Age: 66
End: 2021-05-26

## 2021-06-01 ENCOUNTER — APPOINTMENT (OUTPATIENT)
Dept: CARDIOLOGY | Facility: CLINIC | Age: 66
End: 2021-06-01

## 2021-06-05 ENCOUNTER — APPOINTMENT (OUTPATIENT)
Dept: INTERNAL MEDICINE | Facility: CLINIC | Age: 66
End: 2021-06-05
Payer: COMMERCIAL

## 2021-06-05 VITALS — DIASTOLIC BLOOD PRESSURE: 80 MMHG | SYSTOLIC BLOOD PRESSURE: 140 MMHG

## 2021-06-05 VITALS
TEMPERATURE: 98 F | BODY MASS INDEX: 23.82 KG/M2 | HEART RATE: 78 BPM | RESPIRATION RATE: 16 BRPM | OXYGEN SATURATION: 98 % | HEIGHT: 65 IN | WEIGHT: 143 LBS

## 2021-06-05 PROCEDURE — 99072 ADDL SUPL MATRL&STAF TM PHE: CPT

## 2021-06-05 PROCEDURE — 99214 OFFICE O/P EST MOD 30 MIN: CPT | Mod: 25

## 2021-06-05 PROCEDURE — 36415 COLL VENOUS BLD VENIPUNCTURE: CPT

## 2021-06-05 NOTE — REVIEW OF SYSTEMS
[Fever] : no fever [Chills] : no chills [Chest Pain] : no chest pain [Palpitations] : no palpitations [Lower Ext Edema] : no lower extremity edema [Shortness Of Breath] : no shortness of breath [Wheezing] : no wheezing [Cough] : no cough [Abdominal Pain] : no abdominal pain [Dysuria] : no dysuria [Hematuria] : no hematuria [Headache] : no headache [Dizziness] : no dizziness [Fainting] : no fainting [Anxiety] : no anxiety [Depression] : no depression

## 2021-06-05 NOTE — PLAN
[FreeTextEntry1] : both arms revealed bp 142 / 80  will restart losartan at 1/2 tab of 25 mg daily and pt will monitor his bp and call if too low \par previous labs reviewed\par will get updated labs\par recheck 3 months

## 2021-06-05 NOTE — PHYSICAL EXAM
[No Acute Distress] : no acute distress [Normal Sclera/Conjunctiva] : normal sclera/conjunctiva [Normal Outer Ear/Nose] : the outer ears and nose were normal in appearance [No JVD] : no jugular venous distention [No Lymphadenopathy] : no lymphadenopathy [Supple] : supple [No Respiratory Distress] : no respiratory distress  [Normal Rate] : normal rate  [No Accessory Muscle Use] : no accessory muscle use [Regular Rhythm] : with a regular rhythm [No Edema] : there was no peripheral edema [No Extremity Clubbing/Cyanosis] : no extremity clubbing/cyanosis [Soft] : abdomen soft [Non Tender] : non-tender [Non-distended] : non-distended [No Masses] : no abdominal mass palpated [Grossly Normal Strength/Tone] : grossly normal strength/tone [No Focal Deficits] : no focal deficits [Normal Affect] : the affect was normal [Normal Insight/Judgement] : insight and judgment were intact

## 2021-06-05 NOTE — HISTORY OF PRESENT ILLNESS
[de-identified] : Pt comes for FBW and med renewal  Pt has noticed BP running a little higher  No chest pain or SOB  Had been on losartan 25 mg 1/2 tab daily but cardio stopped it a while ago due to low bp

## 2021-06-05 NOTE — HEALTH RISK ASSESSMENT
[Never (0 pts)] : Never (0 points) [No] : In the past 12 months have you used drugs other than those required for medical reasons? No [No falls in past year] : Patient reported no falls in the past year [0] : 2) Feeling down, depressed, or hopeless: Not at all (0) [] : No [Audit-CScore] : 0 [[id number]] : 0

## 2021-06-07 LAB
ALBUMIN SERPL ELPH-MCNC: 4.5 G/DL
ALP BLD-CCNC: 75 U/L
ALT SERPL-CCNC: 41 U/L
ANION GAP SERPL CALC-SCNC: 14 MMOL/L
AST SERPL-CCNC: 30 U/L
BASOPHILS # BLD AUTO: 0.03 K/UL
BASOPHILS NFR BLD AUTO: 0.7 %
BILIRUB SERPL-MCNC: 0.7 MG/DL
BUN SERPL-MCNC: 20 MG/DL
CALCIUM SERPL-MCNC: 9.3 MG/DL
CHLORIDE SERPL-SCNC: 103 MMOL/L
CHOLEST SERPL-MCNC: 145 MG/DL
CO2 SERPL-SCNC: 22 MMOL/L
CREAT SERPL-MCNC: 0.89 MG/DL
EOSINOPHIL # BLD AUTO: 0.17 K/UL
EOSINOPHIL NFR BLD AUTO: 3.8 %
ESTIMATED AVERAGE GLUCOSE: 143 MG/DL
GLUCOSE SERPL-MCNC: 120 MG/DL
HBA1C MFR BLD HPLC: 6.6 %
HCT VFR BLD CALC: 47.7 %
HDLC SERPL-MCNC: 42 MG/DL
HGB BLD-MCNC: 14.1 G/DL
IMM GRANULOCYTES NFR BLD AUTO: 0.2 %
LDLC SERPL CALC-MCNC: 80 MG/DL
LYMPHOCYTES # BLD AUTO: 1.16 K/UL
LYMPHOCYTES NFR BLD AUTO: 26 %
MAN DIFF?: NORMAL
MCHC RBC-ENTMCNC: 29.6 GM/DL
MCHC RBC-ENTMCNC: 29.6 PG
MCV RBC AUTO: 100 FL
MONOCYTES # BLD AUTO: 0.34 K/UL
MONOCYTES NFR BLD AUTO: 7.6 %
NEUTROPHILS # BLD AUTO: 2.76 K/UL
NEUTROPHILS NFR BLD AUTO: 61.7 %
NONHDLC SERPL-MCNC: 102 MG/DL
PLATELET # BLD AUTO: 214 K/UL
POTASSIUM SERPL-SCNC: 4.3 MMOL/L
PROT SERPL-MCNC: 6.9 G/DL
RBC # BLD: 4.77 M/UL
RBC # FLD: 13.3 %
SODIUM SERPL-SCNC: 138 MMOL/L
TRIGL SERPL-MCNC: 110 MG/DL
TSH SERPL-ACNC: 1.47 UIU/ML
WBC # FLD AUTO: 4.47 K/UL

## 2021-08-25 ENCOUNTER — RX RENEWAL (OUTPATIENT)
Age: 66
End: 2021-08-25

## 2021-08-30 ENCOUNTER — APPOINTMENT (OUTPATIENT)
Dept: INTERNAL MEDICINE | Facility: CLINIC | Age: 66
End: 2021-08-30
Payer: COMMERCIAL

## 2021-08-30 VITALS — SYSTOLIC BLOOD PRESSURE: 124 MMHG | DIASTOLIC BLOOD PRESSURE: 74 MMHG

## 2021-08-30 VITALS
TEMPERATURE: 97.9 F | OXYGEN SATURATION: 99 % | BODY MASS INDEX: 23.75 KG/M2 | HEIGHT: 65 IN | HEART RATE: 87 BPM | WEIGHT: 142.56 LBS

## 2021-08-30 DIAGNOSIS — K21.9 GASTRO-ESOPHAGEAL REFLUX DISEASE W/OUT ESOPHAGITIS: ICD-10-CM

## 2021-08-30 PROCEDURE — 36415 COLL VENOUS BLD VENIPUNCTURE: CPT

## 2021-08-30 PROCEDURE — 99396 PREV VISIT EST AGE 40-64: CPT | Mod: 25

## 2021-08-30 NOTE — HISTORY OF PRESENT ILLNESS
[de-identified] : Pt comes for adult well visit .  Pt  feels well  DM well controlled  Sees Dr Marino every 6 months   Received covid vaccine

## 2021-08-30 NOTE — PHYSICAL EXAM
[No Acute Distress] : no acute distress [Well Nourished] : well nourished [Normal Sclera/Conjunctiva] : normal sclera/conjunctiva [PERRL] : pupils equal round and reactive to light [EOMI] : extraocular movements intact [Normal Outer Ear/Nose] : the outer ears and nose were normal in appearance [Normal Oropharynx] : the oropharynx was normal [No JVD] : no jugular venous distention [No Lymphadenopathy] : no lymphadenopathy [Supple] : supple [No Respiratory Distress] : no respiratory distress  [No Accessory Muscle Use] : no accessory muscle use [Clear to Auscultation] : lungs were clear to auscultation bilaterally [Normal Rate] : normal rate  [Regular Rhythm] : with a regular rhythm [No Carotid Bruits] : no carotid bruits [Pedal Pulses Present] : the pedal pulses are present [No Edema] : there was no peripheral edema [No Extremity Clubbing/Cyanosis] : no extremity clubbing/cyanosis [Soft] : abdomen soft [Non Tender] : non-tender [Non-distended] : non-distended [No Masses] : no abdominal mass palpated [Normal Bowel Sounds] : normal bowel sounds [Declined Rectal Exam] : declined rectal exam [Normal Posterior Cervical Nodes] : no posterior cervical lymphadenopathy [Normal Anterior Cervical Nodes] : no anterior cervical lymphadenopathy [No Spinal Tenderness] : no spinal tenderness [No Joint Swelling] : no joint swelling [Grossly Normal Strength/Tone] : grossly normal strength/tone [No Rash] : no rash [No Focal Deficits] : no focal deficits [Normal Gait] : normal gait [Normal Affect] : the affect was normal [Normal Insight/Judgement] : insight and judgment were intact

## 2021-08-30 NOTE — HEALTH RISK ASSESSMENT
[Never (0 pts)] : Never (0 points) [No] : In the past 12 months have you used drugs other than those required for medical reasons? No [No falls in past year] : Patient reported no falls in the past year [0] : 2) Feeling down, depressed, or hopeless: Not at all (0) [PHQ-2 Negative - No further assessment needed] : PHQ-2 Negative - No further assessment needed [] : No [Audit-CScore] : 0 [[id number]] : 0

## 2021-08-30 NOTE — REVIEW OF SYSTEMS
[Fever] : no fever [Chills] : no chills [Discharge] : no discharge [Vision Problems] : no vision problems [Earache] : no earache [Sore Throat] : no sore throat [Chest Pain] : no chest pain [Palpitations] : no palpitations [Lower Ext Edema] : no lower extremity edema [Shortness Of Breath] : no shortness of breath [Wheezing] : no wheezing [Cough] : no cough [Abdominal Pain] : no abdominal pain [Nausea] : no nausea [Heartburn] : no heartburn [Dysuria] : no dysuria [Hematuria] : no hematuria [Joint Pain] : no joint pain [Joint Stiffness] : no joint stiffness [Joint Swelling] : no joint swelling [Mole Changes] : no mole changes [Skin Rash] : no skin rash [Headache] : no headache [Dizziness] : no dizziness [Unsteady Walk] : no ataxia [Depression] : no depression [Easy Bleeding] : no easy bleeding [Easy Bruising] : no easy bruising

## 2021-08-31 LAB
ALBUMIN SERPL ELPH-MCNC: 4.7 G/DL
ALP BLD-CCNC: 75 U/L
ALT SERPL-CCNC: 18 U/L
ANION GAP SERPL CALC-SCNC: 14 MMOL/L
APPEARANCE: CLEAR
AST SERPL-CCNC: 18 U/L
BACTERIA: NEGATIVE
BASOPHILS # BLD AUTO: 0.04 K/UL
BASOPHILS NFR BLD AUTO: 0.8 %
BILIRUB SERPL-MCNC: 0.9 MG/DL
BILIRUBIN URINE: NEGATIVE
BLOOD URINE: NEGATIVE
BUN SERPL-MCNC: 17 MG/DL
CALCIUM SERPL-MCNC: 9.4 MG/DL
CHLORIDE SERPL-SCNC: 102 MMOL/L
CHOLEST SERPL-MCNC: 137 MG/DL
CO2 SERPL-SCNC: 22 MMOL/L
COLOR: NORMAL
CREAT SERPL-MCNC: 0.89 MG/DL
CREAT SPEC-SCNC: 88 MG/DL
EOSINOPHIL # BLD AUTO: 0.24 K/UL
EOSINOPHIL NFR BLD AUTO: 4.6 %
ESTIMATED AVERAGE GLUCOSE: 148 MG/DL
GLUCOSE QUALITATIVE U: NEGATIVE
GLUCOSE SERPL-MCNC: 97 MG/DL
HBA1C MFR BLD HPLC: 6.8 %
HCT VFR BLD CALC: 43.4 %
HDLC SERPL-MCNC: 42 MG/DL
HGB BLD-MCNC: 13.7 G/DL
HYALINE CASTS: 0 /LPF
IMM GRANULOCYTES NFR BLD AUTO: 0.2 %
KETONES URINE: NEGATIVE
LDLC SERPL CALC-MCNC: 70 MG/DL
LEUKOCYTE ESTERASE URINE: NEGATIVE
LYMPHOCYTES # BLD AUTO: 1.25 K/UL
LYMPHOCYTES NFR BLD AUTO: 23.9 %
MAN DIFF?: NORMAL
MCHC RBC-ENTMCNC: 30 PG
MCHC RBC-ENTMCNC: 31.6 GM/DL
MCV RBC AUTO: 95 FL
MICROALBUMIN 24H UR DL<=1MG/L-MCNC: <1.2 MG/DL
MICROALBUMIN/CREAT 24H UR-RTO: NORMAL MG/G
MICROSCOPIC-UA: NORMAL
MONOCYTES # BLD AUTO: 0.39 K/UL
MONOCYTES NFR BLD AUTO: 7.5 %
NEUTROPHILS # BLD AUTO: 3.29 K/UL
NEUTROPHILS NFR BLD AUTO: 63 %
NITRITE URINE: NEGATIVE
NONHDLC SERPL-MCNC: 95 MG/DL
PH URINE: 6
PLATELET # BLD AUTO: 209 K/UL
POTASSIUM SERPL-SCNC: 4 MMOL/L
PROT SERPL-MCNC: 7 G/DL
PROTEIN URINE: NEGATIVE
PSA SERPL-MCNC: 0.65 NG/ML
RBC # BLD: 4.57 M/UL
RBC # FLD: 12.4 %
RED BLOOD CELLS URINE: 2 /HPF
SODIUM SERPL-SCNC: 138 MMOL/L
SPECIFIC GRAVITY URINE: 1.01
SQUAMOUS EPITHELIAL CELLS: 0 /HPF
TRIGL SERPL-MCNC: 125 MG/DL
TSH SERPL-ACNC: 0.92 UIU/ML
UROBILINOGEN URINE: NORMAL
WBC # FLD AUTO: 5.22 K/UL
WHITE BLOOD CELLS URINE: 0 /HPF

## 2021-09-17 ENCOUNTER — APPOINTMENT (OUTPATIENT)
Dept: INTERNAL MEDICINE | Facility: CLINIC | Age: 66
End: 2021-09-17
Payer: COMMERCIAL

## 2021-09-17 VITALS
OXYGEN SATURATION: 99 % | HEIGHT: 66 IN | WEIGHT: 142 LBS | HEART RATE: 70 BPM | BODY MASS INDEX: 22.82 KG/M2 | RESPIRATION RATE: 16 BRPM | TEMPERATURE: 98 F

## 2021-09-17 VITALS — SYSTOLIC BLOOD PRESSURE: 124 MMHG | DIASTOLIC BLOOD PRESSURE: 74 MMHG

## 2021-09-17 DIAGNOSIS — R10.2 PELVIC AND PERINEAL PAIN: ICD-10-CM

## 2021-09-17 PROCEDURE — G0008: CPT

## 2021-09-17 PROCEDURE — 99213 OFFICE O/P EST LOW 20 MIN: CPT | Mod: 25

## 2021-09-17 PROCEDURE — 90662 IIV NO PRSV INCREASED AG IM: CPT

## 2021-09-17 PROCEDURE — 36415 COLL VENOUS BLD VENIPUNCTURE: CPT

## 2021-09-17 NOTE — REVIEW OF SYSTEMS
[Fever] : no fever [Chills] : no chills [Chest Pain] : no chest pain [Palpitations] : no palpitations [Lower Ext Edema] : no lower extremity edema [Shortness Of Breath] : no shortness of breath [Wheezing] : no wheezing [Cough] : no cough [FreeTextEntry9] : pain in upper right hip

## 2021-09-17 NOTE — PHYSICAL EXAM
[No Acute Distress] : no acute distress [Normal Sclera/Conjunctiva] : normal sclera/conjunctiva [Normal Outer Ear/Nose] : the outer ears and nose were normal in appearance [No JVD] : no jugular venous distention [No Respiratory Distress] : no respiratory distress  [No Accessory Muscle Use] : no accessory muscle use [Clear to Auscultation] : lungs were clear to auscultation bilaterally [Normal Rate] : normal rate  [Regular Rhythm] : with a regular rhythm [No Edema] : there was no peripheral edema [No Extremity Clubbing/Cyanosis] : no extremity clubbing/cyanosis [Soft] : abdomen soft [Non Tender] : non-tender [Non-distended] : non-distended [de-identified] : tender right iliac crest

## 2021-09-20 LAB
COVID-19 SPIKE DOMAIN ANTIBODY INTERPRETATION: POSITIVE
SARS-COV-2 AB SERPL IA-ACNC: >250 U/ML

## 2021-09-21 ENCOUNTER — OUTPATIENT (OUTPATIENT)
Dept: OUTPATIENT SERVICES | Facility: HOSPITAL | Age: 66
LOS: 1 days | End: 2021-09-21
Payer: COMMERCIAL

## 2021-09-21 ENCOUNTER — APPOINTMENT (OUTPATIENT)
Dept: RADIOLOGY | Facility: CLINIC | Age: 66
End: 2021-09-21
Payer: COMMERCIAL

## 2021-09-21 DIAGNOSIS — Z00.8 ENCOUNTER FOR OTHER GENERAL EXAMINATION: ICD-10-CM

## 2021-09-21 DIAGNOSIS — Z98.89 OTHER SPECIFIED POSTPROCEDURAL STATES: Chronic | ICD-10-CM

## 2021-09-21 DIAGNOSIS — R10.2 PELVIC AND PERINEAL PAIN: ICD-10-CM

## 2021-09-21 PROCEDURE — 73502 X-RAY EXAM HIP UNI 2-3 VIEWS: CPT

## 2021-09-21 PROCEDURE — 73502 X-RAY EXAM HIP UNI 2-3 VIEWS: CPT | Mod: 26,RT

## 2021-10-13 ENCOUNTER — APPOINTMENT (OUTPATIENT)
Dept: CARDIOLOGY | Facility: CLINIC | Age: 66
End: 2021-10-13
Payer: COMMERCIAL

## 2021-10-13 VITALS
HEART RATE: 76 BPM | WEIGHT: 142 LBS | HEIGHT: 66 IN | OXYGEN SATURATION: 100 % | SYSTOLIC BLOOD PRESSURE: 128 MMHG | BODY MASS INDEX: 22.82 KG/M2 | DIASTOLIC BLOOD PRESSURE: 80 MMHG

## 2021-10-13 PROCEDURE — 99214 OFFICE O/P EST MOD 30 MIN: CPT

## 2021-10-13 NOTE — REVIEW OF SYSTEMS
[Heartburn] : heartburn [Negative] : Genitourinary [Joint Pain] : no joint pain [Rash] : no rash [Dizziness] : no dizziness [Depression] : no depression [Anxiety] : no anxiety [Easy Bleeding] : no tendency for easy bleeding [Easy Bruising] : no tendency for easy bruising

## 2021-10-13 NOTE — PHYSICAL EXAM
[General Appearance - Well Developed] : well developed [Normal Appearance] : normal appearance [Well Groomed] : well groomed [General Appearance - Well Nourished] : well nourished [No Deformities] : no deformities [General Appearance - In No Acute Distress] : no acute distress [Normal Conjunctiva] : the conjunctiva exhibited no abnormalities [Normal Oral Mucosa] : normal oral mucosa [Normal Jugular Venous A Waves Present] : normal jugular venous A waves present [Normal Jugular Venous V Waves Present] : normal jugular venous V waves present [No Jugular Venous Dyson A Waves] : no jugular venous dyson A waves [Respiration, Rhythm And Depth] : normal respiratory rhythm and effort [Exaggerated Use Of Accessory Muscles For Inspiration] : no accessory muscle use [Auscultation Breath Sounds / Voice Sounds] : lungs were clear to auscultation bilaterally [Abdomen Soft] : soft [Bowel Sounds] : normal bowel sounds [Abdomen Tenderness] : non-tender [Abnormal Walk] : normal gait [Gait - Sufficient For Exercise Testing] : the gait was sufficient for exercise testing [Nail Clubbing] : no clubbing of the fingernails [Cyanosis, Localized] : no localized cyanosis [Skin Color & Pigmentation] : normal skin color and pigmentation [Skin Turgor] : normal skin turgor [Oriented To Time, Place, And Person] : oriented to person, place, and time [] : no rash [Impaired Insight] : insight and judgment were intact [No Anxiety] : not feeling anxious [Normal Rate] : normal [Normal S1] : normal S1 [Normal S2] : normal S2 [No Murmur] : no murmurs heard [2+] : left 2+ [1+] : left 1+ [No Abnormalities] : the abdominal aorta was not enlarged and no bruit was heard [No Pitting Edema] : no pitting edema present [S3] : no S3 [S4] : no S4 [Right Carotid Bruit] : no bruit heard over the right carotid [Left Carotid Bruit] : no bruit heard over the left carotid [Right Femoral Bruit] : no bruit heard over the right femoral artery [Left Femoral Bruit] : no bruit heard over the left femoral artery

## 2021-10-13 NOTE — HISTORY OF PRESENT ILLNESS
[FreeTextEntry1] : I saw Aren Ivory in the office today for a follow up visit.. He is a 66-year-old male who underwent cardiac catheterization following an abnormal stress test in June 2012. He had a 99% LAD lesion that was treated with a drug-eluting stent. The other arteries were normal. Left ventricular end-diastolic pressure was 25. Also has diabetes and high cholesterol. He is exercising regularly and has no symptoms however he does get slightly lightheaded during exercise. His metoprolol was decreased from 25mg to 12.5 mg a day. He also is getting heartburn and saw his Gastroenterologist. \par \par 5/20, patient episode of vertigo and then passed out.. He went to the emergency room where his EKG, blood work including troponin were negative. He had no chest pain or shortness of breath. A CAT scan showed a hyperlucent area in the right frontal horn that was felt to possibly represent a prior infarct. I looked at his previous CAT scan and MRI scan and this was not commented on.Went to see the neurologist felt it was vertigo and not a stroke. A carotid Doppler performed 5/20 showed a mild plaque without change.\par \par Since that time the patient has had 2 more episodes where he felt he would pass out. Each episode got better when he laid down. Associated with some diaphoresis but no chest pain, shortness of breath, palpitation.  He was having orthostatic hypotension which the losartan was exacerbating.  Losartan was stopped and his had no further events.\par \par Blood work 8/21 demonstrated an A1c of 6.8.  Cholesterol 137, triglycerides 125, HDL 42, and LDL 70.. Chemistry and CBC were unremarkable.\par \par Nuclear stress test 4/21 demonstrated no ischemia to workload of 10 METS.  EF was 56%..\par \par Patient most recently had shoulder surgery 4/21 that went well.  He still getting physical therapy but is almost back to normal activities.\par \par

## 2021-10-13 NOTE — DISCUSSION/SUMMARY
[FreeTextEntry1] : Clinically the patient is doing well.  He is active and has no chest pain, shortness of breath, or palpitation.  His blood pressure tends to be low and he does not take the losartan on a regular basis.  We went over his medication, his blood work, and most recent stress test, and his lifestyle.  I answered all of his questions.\par \par He will stay on his present medication.  If he does have any problems he would call me.  If all is well I would see him in 6 months.

## 2021-10-19 ENCOUNTER — RX RENEWAL (OUTPATIENT)
Age: 66
End: 2021-10-19

## 2021-10-27 ENCOUNTER — APPOINTMENT (OUTPATIENT)
Dept: SPEECH THERAPY | Facility: CLINIC | Age: 66
End: 2021-10-27

## 2022-01-12 ENCOUNTER — NON-APPOINTMENT (OUTPATIENT)
Age: 67
End: 2022-01-12

## 2022-01-13 ENCOUNTER — RX RENEWAL (OUTPATIENT)
Age: 67
End: 2022-01-13

## 2022-01-15 ENCOUNTER — APPOINTMENT (OUTPATIENT)
Dept: INTERNAL MEDICINE | Facility: CLINIC | Age: 67
End: 2022-01-15
Payer: COMMERCIAL

## 2022-01-15 VITALS
HEIGHT: 66 IN | BODY MASS INDEX: 22.5 KG/M2 | WEIGHT: 140 LBS | OXYGEN SATURATION: 98 % | HEART RATE: 80 BPM | TEMPERATURE: 98 F

## 2022-01-15 VITALS — DIASTOLIC BLOOD PRESSURE: 70 MMHG | SYSTOLIC BLOOD PRESSURE: 122 MMHG

## 2022-01-15 PROCEDURE — 90670 PCV13 VACCINE IM: CPT

## 2022-01-15 PROCEDURE — 90471 IMMUNIZATION ADMIN: CPT

## 2022-01-15 PROCEDURE — 99213 OFFICE O/P EST LOW 20 MIN: CPT | Mod: 25

## 2022-01-15 PROCEDURE — 36415 COLL VENOUS BLD VENIPUNCTURE: CPT

## 2022-01-15 RX ORDER — LOSARTAN POTASSIUM 25 MG/1
25 TABLET, FILM COATED ORAL
Qty: 45 | Refills: 0 | Status: DISCONTINUED | COMMUNITY
Start: 2021-06-05 | End: 2022-01-15

## 2022-01-15 NOTE — REVIEW OF SYSTEMS
[Fever] : no fever [Chills] : no chills [Chest Pain] : no chest pain [Palpitations] : no palpitations [Lower Ext Edema] : no lower extremity edema [Shortness Of Breath] : no shortness of breath [Wheezing] : no wheezing [Cough] : no cough [Abdominal Pain] : no abdominal pain [Vomiting] : no vomiting [Dysuria] : no dysuria [Joint Pain] : no joint pain [Joint Stiffness] : no joint stiffness

## 2022-01-15 NOTE — PHYSICAL EXAM
[No Acute Distress] : no acute distress [Normal Sclera/Conjunctiva] : normal sclera/conjunctiva [Normal Outer Ear/Nose] : the outer ears and nose were normal in appearance [Normal Oropharynx] : the oropharynx was normal [No JVD] : no jugular venous distention [No Lymphadenopathy] : no lymphadenopathy [No Respiratory Distress] : no respiratory distress  [No Accessory Muscle Use] : no accessory muscle use [Normal Rate] : normal rate  [Regular Rhythm] : with a regular rhythm [No Edema] : there was no peripheral edema [No Extremity Clubbing/Cyanosis] : no extremity clubbing/cyanosis [Soft] : abdomen soft [Non Tender] : non-tender [Non-distended] : non-distended [No Masses] : no abdominal mass palpated [Grossly Normal Strength/Tone] : grossly normal strength/tone [No Focal Deficits] : no focal deficits

## 2022-01-17 LAB
ALBUMIN SERPL ELPH-MCNC: 4.5 G/DL
ALP BLD-CCNC: 68 U/L
ALT SERPL-CCNC: 29 U/L
ANION GAP SERPL CALC-SCNC: 13 MMOL/L
AST SERPL-CCNC: 25 U/L
BASOPHILS # BLD AUTO: 0.04 K/UL
BASOPHILS NFR BLD AUTO: 0.9 %
BILIRUB SERPL-MCNC: 0.8 MG/DL
BUN SERPL-MCNC: 21 MG/DL
CALCIUM SERPL-MCNC: 9.3 MG/DL
CHLORIDE SERPL-SCNC: 102 MMOL/L
CHOLEST SERPL-MCNC: 132 MG/DL
CO2 SERPL-SCNC: 26 MMOL/L
CREAT SERPL-MCNC: 1 MG/DL
EOSINOPHIL # BLD AUTO: 0.22 K/UL
EOSINOPHIL NFR BLD AUTO: 4.7 %
ESTIMATED AVERAGE GLUCOSE: 151 MG/DL
GLUCOSE SERPL-MCNC: 114 MG/DL
HBA1C MFR BLD HPLC: 6.9 %
HCT VFR BLD CALC: 45.1 %
HDLC SERPL-MCNC: 40 MG/DL
HGB BLD-MCNC: 14.2 G/DL
IMM GRANULOCYTES NFR BLD AUTO: 0.6 %
LDLC SERPL CALC-MCNC: 81 MG/DL
LYMPHOCYTES # BLD AUTO: 1.14 K/UL
LYMPHOCYTES NFR BLD AUTO: 24.5 %
MAN DIFF?: NORMAL
MCHC RBC-ENTMCNC: 29.2 PG
MCHC RBC-ENTMCNC: 31.5 GM/DL
MCV RBC AUTO: 92.6 FL
MONOCYTES # BLD AUTO: 0.35 K/UL
MONOCYTES NFR BLD AUTO: 7.5 %
NEUTROPHILS # BLD AUTO: 2.88 K/UL
NEUTROPHILS NFR BLD AUTO: 61.8 %
NONHDLC SERPL-MCNC: 92 MG/DL
PLATELET # BLD AUTO: 225 K/UL
POTASSIUM SERPL-SCNC: 4.4 MMOL/L
PROT SERPL-MCNC: 7.1 G/DL
RBC # BLD: 4.87 M/UL
RBC # FLD: 11.9 %
SODIUM SERPL-SCNC: 141 MMOL/L
TRIGL SERPL-MCNC: 59 MG/DL
TSH SERPL-ACNC: 0.77 UIU/ML
WBC # FLD AUTO: 4.66 K/UL

## 2022-01-28 RX ORDER — BLOOD SUGAR DIAGNOSTIC
STRIP MISCELLANEOUS
Qty: 2 | Refills: 3 | Status: DISCONTINUED | COMMUNITY
Start: 2020-11-03 | End: 2022-01-28

## 2022-01-28 RX ORDER — BLOOD-GLUCOSE METER
W/DEVICE EACH MISCELLANEOUS
Qty: 1 | Refills: 1 | Status: DISCONTINUED | COMMUNITY
Start: 2022-01-15 | End: 2022-01-28

## 2022-03-21 ENCOUNTER — APPOINTMENT (OUTPATIENT)
Dept: ENDOCRINOLOGY | Facility: CLINIC | Age: 67
End: 2022-03-21
Payer: COMMERCIAL

## 2022-03-21 VITALS
HEART RATE: 80 BPM | WEIGHT: 141 LBS | HEIGHT: 66 IN | DIASTOLIC BLOOD PRESSURE: 78 MMHG | OXYGEN SATURATION: 98 % | BODY MASS INDEX: 22.66 KG/M2 | SYSTOLIC BLOOD PRESSURE: 122 MMHG

## 2022-03-21 PROCEDURE — 82962 GLUCOSE BLOOD TEST: CPT

## 2022-03-21 PROCEDURE — 99205 OFFICE O/P NEW HI 60 MIN: CPT | Mod: 25

## 2022-03-22 LAB — GLUCOSE BLDC GLUCOMTR-MCNC: 102

## 2022-04-29 ENCOUNTER — APPOINTMENT (OUTPATIENT)
Dept: OTOLARYNGOLOGY | Facility: CLINIC | Age: 67
End: 2022-04-29
Payer: COMMERCIAL

## 2022-04-29 VITALS
DIASTOLIC BLOOD PRESSURE: 73 MMHG | HEART RATE: 76 BPM | BODY MASS INDEX: 22.18 KG/M2 | WEIGHT: 138 LBS | HEIGHT: 66 IN | SYSTOLIC BLOOD PRESSURE: 117 MMHG

## 2022-04-29 DIAGNOSIS — H93.8X3 OTHER SPECIFIED DISORDERS OF EAR, BILATERAL: ICD-10-CM

## 2022-04-29 DIAGNOSIS — Z86.39 PERSONAL HISTORY OF OTHER ENDOCRINE, NUTRITIONAL AND METABOLIC DISEASE: ICD-10-CM

## 2022-04-29 PROCEDURE — 99204 OFFICE O/P NEW MOD 45 MIN: CPT | Mod: 25

## 2022-04-29 PROCEDURE — 31231 NASAL ENDOSCOPY DX: CPT

## 2022-04-29 PROCEDURE — 69210 REMOVE IMPACTED EAR WAX UNI: CPT | Mod: 59

## 2022-04-29 NOTE — ASSESSMENT
[FreeTextEntry1] : Aren Ivory presents for evaluation of acute sinusitis. He has history of allergic rhinitis. Given his diabetes history, endoscopic exam was performed today showing bilateral sinonasal inflammation and drainage, as well as possible polyp in left nasal cavity. We will treat as below and then obtain CT sinus. In addition, given his allergy history, will refer to allergy for new testing and treatment. Bilateral cerumen impaction was removed.\par \par - augmentin x 10 days. Side effects were discussed and include but are not limited to nausea, vomiting, diarrhea, and skin rash.\par - nasal saline sprays TID\par - fluticasone 2 sprays BID\par - Allergy referral\par - Follow up in 2 weeks. CT sinus at that time.

## 2022-04-29 NOTE — REVIEW OF SYSTEMS
[Sneezing] : sneezing [Seasonal Allergies] : seasonal allergies [Post Nasal Drip] : post nasal drip [Ear Pain] : ear pain [Ear Itch] : ear itch [Nasal Congestion] : nasal congestion [Recurrent Sinus Infections] : recurrent sinus infections [Throat Dryness] : throat dryness [Throat Itching] : throat itching [Eyes Itch] : itching of the eyes [Negative] : Heme/Lymph

## 2022-04-29 NOTE — HISTORY OF PRESENT ILLNESS
[de-identified] : Aren Ivory is a 65 yo male with hx CAD s/p stent placement, T2DM, who presents for evaluation of sinusitis. He states that he began having symptoms of a sinus infection a few weeks ago. He notes bilateral nasal congestion as well as bilateral cheek and forehead pressure. He notes bilateral aural fullness. He denies rhinorrhea or postnasal drainage. He notes sneezing and epiphora. He denies fevers, chills, vision changes, or pain/restriction of extraocular movements. He denies facial numbness or palate numbness. He has never had sinus surgery. He has been allergy tested over ten years ago, and he was told he is allergic to grass or pollen. He does not see an allergist and has never undergone immunotherapy. He notes that he has about one sinus infection per year with no sinusitis symptoms at baseline. He is using a sinus spray at this time. He does not take an antihistamine.

## 2022-04-29 NOTE — PHYSICAL EXAM
[Nasal Endoscopy Performed] : nasal endoscopy was performed, see procedure section for findings [Midline] : trachea located in midline position [Normal] : no rashes [de-identified] : Bilateral cerumen impaction

## 2022-05-04 ENCOUNTER — NON-APPOINTMENT (OUTPATIENT)
Age: 67
End: 2022-05-04

## 2022-05-04 ENCOUNTER — APPOINTMENT (OUTPATIENT)
Dept: CARDIOLOGY | Facility: CLINIC | Age: 67
End: 2022-05-04
Payer: COMMERCIAL

## 2022-05-04 VITALS
WEIGHT: 139 LBS | SYSTOLIC BLOOD PRESSURE: 113 MMHG | HEART RATE: 69 BPM | BODY MASS INDEX: 22.34 KG/M2 | DIASTOLIC BLOOD PRESSURE: 71 MMHG | OXYGEN SATURATION: 97 % | HEIGHT: 66 IN

## 2022-05-04 PROCEDURE — 93000 ELECTROCARDIOGRAM COMPLETE: CPT

## 2022-05-04 PROCEDURE — 99214 OFFICE O/P EST MOD 30 MIN: CPT

## 2022-05-04 NOTE — DISCUSSION/SUMMARY
[FreeTextEntry1] : Clinically the patient is doing well.  He exercises and has no chest pain, shortness of breath, palpitation.  He has had no further episodes of lightheadedness.  We have tried in the past on very low-dose angiotensin receptor blocker which he could not tolerate because of symptomatic hypotension.\par \par The LDL is  81.  Has been as low as 69 in the past.  He is going to watch his diet more carefully.  If the LDL remains above 70 we may increase his atorvastatin or add Zetia.  He we will repeat his blood work in 6 months and further recommendations will be made at that time.  Otherwise he will stay on his present medication.  If he does have any symptoms or problems he would call us.  We will see him in 6 months.

## 2022-05-04 NOTE — HISTORY OF PRESENT ILLNESS
[FreeTextEntry1] : I saw Aren Ivory in the office today for a follow up visit.. He is a 66-year-old male who underwent cardiac catheterization following an abnormal stress test in June 2012. He had a 99% LAD lesion that was treated with a drug-eluting stent. The other arteries were normal. Left ventricular end-diastolic pressure was 25. Also has diabetes and high cholesterol. He is exercising regularly and has no symptoms however he does get slightly lightheaded during exercise. His metoprolol was decreased from 25mg to 12.5 mg a day. He also is getting heartburn and saw his Gastroenterologist. \par \par 5/20, patient episode of vertigo and then passed out.. He went to the emergency room where his EKG, blood work including troponin were negative. He had no chest pain or shortness of breath. A CAT scan showed a hyperlucent area in the right frontal horn that was felt to possibly represent a prior infarct. I looked at his previous CAT scan and MRI scan and this was not commented on.Went to see the neurologist felt it was vertigo and not a stroke. A carotid Doppler performed 5/20 showed a mild plaque without change.\par \par Since that time the patient has had 2 more episodes where he felt he would pass out. Each episode got better when he laid down. Associated with some diaphoresis but no chest pain, shortness of breath, palpitation.  He was having orthostatic hypotension which the losartan was exacerbating.  Losartan was stopped and his had no further events.\par \par Blood work 3/22 demonstrated an A1c of 6.9.  Cholesterol 132, triglycerides 59, HDL 40, and LDL 81... Chemistry and CBC were unremarkable.\par \par Nuclear stress test 4/21 demonstrated no ischemia to workload of 10 METS.  EF was 56%..\par \par Patient most recently had shoulder surgery 4/21 that went well.  He still getting physical therapy but is almost back to normal activities.\par \par ECG demonstrates sinus rhythm and remains normal.\par \par

## 2022-05-07 ENCOUNTER — APPOINTMENT (OUTPATIENT)
Dept: INTERNAL MEDICINE | Facility: CLINIC | Age: 67
End: 2022-05-07
Payer: COMMERCIAL

## 2022-05-07 VITALS
WEIGHT: 137 LBS | TEMPERATURE: 97 F | RESPIRATION RATE: 16 BRPM | HEIGHT: 66 IN | HEART RATE: 71 BPM | OXYGEN SATURATION: 95 % | BODY MASS INDEX: 22.02 KG/M2

## 2022-05-07 VITALS — DIASTOLIC BLOOD PRESSURE: 68 MMHG | SYSTOLIC BLOOD PRESSURE: 118 MMHG

## 2022-05-07 DIAGNOSIS — S76.011A STRAIN OF MUSCLE, FASCIA AND TENDON OF RIGHT HIP, INITIAL ENCOUNTER: ICD-10-CM

## 2022-05-07 PROCEDURE — 99214 OFFICE O/P EST MOD 30 MIN: CPT | Mod: 25

## 2022-05-07 PROCEDURE — 36415 COLL VENOUS BLD VENIPUNCTURE: CPT

## 2022-05-07 NOTE — HISTORY OF PRESENT ILLNESS
[de-identified] : Pt comes for FBW and med renewal  Just saw cardio  Feeling fine except for rt hip flexor strain  Feels it especially when he gets out of his car

## 2022-05-07 NOTE — PHYSICAL EXAM
[No Acute Distress] : no acute distress [Normal Sclera/Conjunctiva] : normal sclera/conjunctiva [Normal Outer Ear/Nose] : the outer ears and nose were normal in appearance [No JVD] : no jugular venous distention [No Respiratory Distress] : no respiratory distress  [No Accessory Muscle Use] : no accessory muscle use [Clear to Auscultation] : lungs were clear to auscultation bilaterally [Regular Rhythm] : with a regular rhythm [Normal Rate] : normal rate  [No Edema] : there was no peripheral edema [No Extremity Clubbing/Cyanosis] : no extremity clubbing/cyanosis [Soft] : abdomen soft [Non Tender] : non-tender [Non-distended] : non-distended [de-identified] : small laya inguinal hernias

## 2022-05-08 LAB
ALBUMIN SERPL ELPH-MCNC: 4.7 G/DL
ALP BLD-CCNC: 72 U/L
ALT SERPL-CCNC: 17 U/L
ANION GAP SERPL CALC-SCNC: 14 MMOL/L
AST SERPL-CCNC: 15 U/L
BASOPHILS # BLD AUTO: 0.04 K/UL
BASOPHILS NFR BLD AUTO: 0.8 %
BILIRUB SERPL-MCNC: 0.8 MG/DL
BUN SERPL-MCNC: 20 MG/DL
CALCIUM SERPL-MCNC: 9.2 MG/DL
CHLORIDE SERPL-SCNC: 103 MMOL/L
CHOLEST SERPL-MCNC: 134 MG/DL
CO2 SERPL-SCNC: 24 MMOL/L
CREAT SERPL-MCNC: 0.86 MG/DL
CREAT SPEC-SCNC: 149 MG/DL
EGFR: 96 ML/MIN/1.73M2
EOSINOPHIL # BLD AUTO: 0.27 K/UL
EOSINOPHIL NFR BLD AUTO: 5.6 %
ESTIMATED AVERAGE GLUCOSE: 146 MG/DL
GLUCOSE SERPL-MCNC: 115 MG/DL
HBA1C MFR BLD HPLC: 6.7 %
HCT VFR BLD CALC: 44.2 %
HDLC SERPL-MCNC: 38 MG/DL
HGB BLD-MCNC: 13.7 G/DL
IMM GRANULOCYTES NFR BLD AUTO: 0.2 %
LDLC SERPL CALC-MCNC: 82 MG/DL
LYMPHOCYTES # BLD AUTO: 1.3 K/UL
LYMPHOCYTES NFR BLD AUTO: 26.9 %
MAN DIFF?: NORMAL
MCHC RBC-ENTMCNC: 29.2 PG
MCHC RBC-ENTMCNC: 31 GM/DL
MCV RBC AUTO: 94.2 FL
MICROALBUMIN 24H UR DL<=1MG/L-MCNC: 1.5 MG/DL
MICROALBUMIN/CREAT 24H UR-RTO: 10 MG/G
MONOCYTES # BLD AUTO: 0.32 K/UL
MONOCYTES NFR BLD AUTO: 6.6 %
NEUTROPHILS # BLD AUTO: 2.89 K/UL
NEUTROPHILS NFR BLD AUTO: 59.9 %
NONHDLC SERPL-MCNC: 96 MG/DL
PLATELET # BLD AUTO: 240 K/UL
POTASSIUM SERPL-SCNC: 4.3 MMOL/L
PROT SERPL-MCNC: 6.9 G/DL
RBC # BLD: 4.69 M/UL
RBC # FLD: 11.9 %
SODIUM SERPL-SCNC: 140 MMOL/L
TRIGL SERPL-MCNC: 72 MG/DL
TSH SERPL-ACNC: 1.08 UIU/ML
WBC # FLD AUTO: 4.83 K/UL

## 2022-05-17 ENCOUNTER — APPOINTMENT (OUTPATIENT)
Dept: PEDIATRIC ALLERGY IMMUNOLOGY | Facility: CLINIC | Age: 67
End: 2022-05-17
Payer: COMMERCIAL

## 2022-05-17 DIAGNOSIS — H10.13 ACUTE ATOPIC CONJUNCTIVITIS, BILATERAL: ICD-10-CM

## 2022-05-17 DIAGNOSIS — J30.1 ALLERGIC RHINITIS DUE TO POLLEN: ICD-10-CM

## 2022-05-17 DIAGNOSIS — Z01.82 ENCOUNTER FOR ALLERGY TESTING: ICD-10-CM

## 2022-05-17 PROCEDURE — 95004 PERQ TESTS W/ALRGNC XTRCS: CPT

## 2022-05-17 PROCEDURE — 99204 OFFICE O/P NEW MOD 45 MIN: CPT | Mod: 25

## 2022-05-17 RX ORDER — AMOXICILLIN AND CLAVULANATE POTASSIUM 875; 125 MG/1; MG/1
875-125 TABLET, COATED ORAL
Qty: 20 | Refills: 0 | Status: COMPLETED | COMMUNITY
Start: 2022-04-29 | End: 2022-05-17

## 2022-05-17 RX ORDER — FLUTICASONE PROPIONATE 50 UG/1
50 SPRAY, METERED NASAL TWICE DAILY
Qty: 1 | Refills: 3 | Status: COMPLETED | COMMUNITY
Start: 2022-04-29 | End: 2022-05-17

## 2022-05-17 NOTE — CONSULT LETTER
[Dear  ___] : Dear  [unfilled], [Consult Letter:] : I had the pleasure of evaluating your patient, [unfilled]. [Thank you for referring [unfilled] for consultation for _____] : Thank you for referring [unfilled] for consultation for [unfilled] [Please see my note below.] : Please see my note below. [Consult Closing:] : Thank you very much for allowing me to participate in the care of this patient.  If you have any questions, please do not hesitate to contact me. [Sincerely,] : Sincerely, [FreeTextEntry3] : Araceli EVANS

## 2022-05-17 NOTE — SOCIAL HISTORY
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

## 2022-05-17 NOTE — REVIEW OF SYSTEMS
[Rhinorrhea] : rhinorrhea [Nasal Congestion] : nasal congestion [Post Nasal Drip] : post nasal drip [Nl] : Integumentary [Immunizations are up to date] : Immunizations are up to date [Received Influenza Vaccine this Past Year] : patient has received the Influenza vaccine this past year [FreeTextEntry3] : watery eyes

## 2022-05-17 NOTE — ASSESSMENT
[FreeTextEntry1] : 66 y.o male with hx of seasonal AR which leads to acute sinusitis presents for skin testing with residual nasal congestion and watery eyes after recent sinusitis tx with Augmentin and Flonase.\par \par Skin test today shows large positive to tree mix  and very minimal to dock/sorrel mix\par \par ANDRADE/SAC\par -Re-start Flonase 50mcg 2 sprays QD\par -Start Zyrtec 10mg 1 tab PO QD\par -Start Azelastine 0.05% eye drops 1 gtt BID\par -Meds should be continued for another month\par \par Follow-up with Dr. Parsons for CT sinus\par \par Should see patient back next April to pre-treat in preparation for spring as response to medications is better when started early. Preventing inflammation with early treatment can also help prevent acute sinusitis he typically gets each spring. Can also consider allergy shots if symptoms become refractory or more persistent\par \par Patient seen by JASMYN Oconnell\par

## 2022-05-17 NOTE — PHYSICAL EXAM
[Alert] : alert [Well Nourished] : well nourished [Healthy Appearance] : healthy appearance [No Acute Distress] : no acute distress [Well Developed] : well developed [Normal Voice/Communication] : normal voice communication [Normal Pupil & Iris Size/Symmetry] : normal pupil and iris size and symmetry [No Discharge] : no discharge [No Photophobia] : no photophobia [Sclera Not Icteric] : sclera not icteric [Normal TMs] : both tympanic membranes were normal [Normal Lips/Tongue] : the lips and tongue were normal [Normal Outer Ear/Nose] : the ears and nose were normal in appearance [Normal Tonsils] : normal tonsils [No Thrush] : no thrush [No Neck Mass] : no neck mass was observed [Normal Rate and Effort] : normal respiratory rhythm and effort [Bilateral Audible Breath Sounds] : bilateral audible breath sounds [Normal Rate] : heart rate was normal  [Normal S1, S2] : normal S1 and S2 [Normal Cervical Lymph Nodes] : cervical [Skin Intact] : skin intact  [No Rash] : no rash [Normal Mood] : mood was normal [Normal Affect] : affect was normal [Wheezing] : no wheezing was heard [de-identified] : edematous turbinates, no polyps seen

## 2022-05-17 NOTE — HISTORY OF PRESENT ILLNESS
[Asthma] : asthma [Eczematous rashes] : eczematous rashes [Food Allergies] : food allergies [de-identified] : 66 y.o male with hx of pollen allergies dx about ten years ago presents for reevaluation. Patient states every spring he experiences PND, sinus pressure, and nasal congestion which often leads to one acute sinus infection that requires/responds well to antibiotics. Last spring he did very well since his time outdoors was limited but this spring he spends time outside gardening. In addition he's treated himself in the past with antihistamine like Claritin 10mg which  have helped. He denies any other recurrent infections\par \par His symptoms started to resurface a few weeks back which included bilateral nasal congestion as well as bilateral cheek and forehead pressure and he was see by ENT  on 4/29/22. Nasal endoscopy done on his visit on revealed: Possible polyp in left nasal cavity. Left middle meatus with rolled uncinate as well as thick drainage and Moderate mucosal inflammation and thin drainage on the right. Augmentin 875 mg BID x 10 days, nasal saline sprays TID, fluticasone 2 sprays BID was given and referral for allergy.\par \par Patient claims he is doing much better after ENT treatment but still has some residual nasal congestion and watery eyes. He did discontinue the saline spray and Flonase. Sinus CT is still pending.

## 2022-05-17 NOTE — REASON FOR VISIT
[Allergy Evaluation/ Skin Testing] : allergy evaluation and or skin testing [Congestion] : congestion [Runny Nose] : runny nose [Discharge of Eyes] : discharge of eyes [Initial Evaluation] : an initial evaluation of

## 2022-05-20 ENCOUNTER — RESULT REVIEW (OUTPATIENT)
Age: 67
End: 2022-05-20

## 2022-05-20 ENCOUNTER — APPOINTMENT (OUTPATIENT)
Dept: OTOLARYNGOLOGY | Facility: CLINIC | Age: 67
End: 2022-05-20
Payer: COMMERCIAL

## 2022-05-20 VITALS
WEIGHT: 138 LBS | HEIGHT: 66 IN | BODY MASS INDEX: 22.18 KG/M2 | HEART RATE: 64 BPM | DIASTOLIC BLOOD PRESSURE: 72 MMHG | SYSTOLIC BLOOD PRESSURE: 116 MMHG

## 2022-05-20 DIAGNOSIS — J30.9 ALLERGIC RHINITIS, UNSPECIFIED: ICD-10-CM

## 2022-05-20 DIAGNOSIS — J32.9 CHRONIC SINUSITIS, UNSPECIFIED: ICD-10-CM

## 2022-05-20 DIAGNOSIS — J01.90 ACUTE SINUSITIS, UNSPECIFIED: ICD-10-CM

## 2022-05-20 PROCEDURE — 70486 CT MAXILLOFACIAL W/O DYE: CPT

## 2022-05-20 PROCEDURE — 99214 OFFICE O/P EST MOD 30 MIN: CPT

## 2022-05-20 NOTE — DATA REVIEWED
[de-identified] : CT Sinus performed in office today:\par On personal review, there is evidence of significant bilateral sinus disease, right worse than left, involving the maxillary, ethmoid, and frontal sinuses. The sphenoid sinuses are aerated but the sphenoethmoidal recesses are opacified.

## 2022-05-20 NOTE — HISTORY OF PRESENT ILLNESS
[de-identified] : Aren Ivory is a 65 yo male with hx CAD s/p stent placement, T2DM, who presents for evaluation of sinusitis. He states that he began having symptoms of a sinus infection a few weeks ago. He notes bilateral nasal congestion as well as bilateral cheek and forehead pressure. He notes bilateral aural fullness. He denies rhinorrhea or postnasal drainage. He notes sneezing and epiphora. He denies fevers, chills, vision changes, or pain/restriction of extraocular movements. He denies facial numbness or palate numbness. He has never had sinus surgery. He has been allergy tested over ten years ago, and he was told he is allergic to grass or pollen. He does not see an allergist and has never undergone immunotherapy. He notes that he has about one sinus infection per year with no sinusitis symptoms at baseline. He is using a sinus spray at this time. He does not take an antihistamine. [FreeTextEntry1] : 5/20/22 - Mr. Ivory presents for follow-up. He completed his antibiotic course and topical nasal regimen for sinusitis. He states that he has improvement in sinus pressure. He has some nasal congestion and some rhinorrhea. He denies postnasal drainage. He denies vision changes or pain/restriction of extraocular movemnts. He denies fevers or chills. He was seen by allergy. Testing was positive for tree mix and he was started on zyrtec and azelastine sprays, as well as continue flonase.\par \par He states that he may have had a nasal polyp removed previously.

## 2022-05-20 NOTE — ASSESSMENT
[FreeTextEntry1] : Aren Ivory presents for follow-up. He notes improvement in his sinusitis symptoms, but still has some nasal congestion and rhinorrhea. Allergy note was reviewed and allergy testing was positive for tree mix. He was started on an allergy regimen. Given his endoscopic exam at last visit showing possible polyp, CT sinus was performed today. This showed significant diffuse bilateral sinus disease, right worse than left. We discussed that he is a candidate for bilateral endoscopic sinus surgery.\par \par The sinus surgery itself was discussed at length. Potential benefits of the procedure were discussed, though no guarantee was made nor implied. Alternatives of doing nothing versus ongoing medical therapy alone were also discussed. The risks of the procedure were individually discussed in detail as including, but not limited to bleeding, infection, loss of sense of smell, double vision, blindness, CSF leak requiring other procedures to repair, numbness of teeth/and or face, need for revision surgery, brain/skull base injury, anesthetic risks and unexpected risks. The patient understands these risks. All questions were answered.\par \par We will post the surgery for now and the patient will consider this. He will speak with my  regarding timing of surgery.\par \par - Start sinus rinses BID.\par - Continue flonase and azelastine per allergy.\par - Follow up 1 week postop.\par

## 2022-05-20 NOTE — REVIEW OF SYSTEMS
[Seasonal Allergies] : seasonal allergies [Nasal Congestion] : nasal congestion [Negative] : Heme/Lymph

## 2022-06-21 ENCOUNTER — APPOINTMENT (OUTPATIENT)
Dept: ENDOCRINOLOGY | Facility: CLINIC | Age: 67
End: 2022-06-21
Payer: COMMERCIAL

## 2022-06-21 PROCEDURE — 99214 OFFICE O/P EST MOD 30 MIN: CPT

## 2022-08-22 ENCOUNTER — APPOINTMENT (OUTPATIENT)
Dept: UROLOGY | Facility: CLINIC | Age: 67
End: 2022-08-22

## 2022-08-22 VITALS
DIASTOLIC BLOOD PRESSURE: 76 MMHG | HEIGHT: 66 IN | RESPIRATION RATE: 16 BRPM | WEIGHT: 136 LBS | TEMPERATURE: 97.5 F | HEART RATE: 80 BPM | BODY MASS INDEX: 21.86 KG/M2 | SYSTOLIC BLOOD PRESSURE: 135 MMHG

## 2022-08-22 DIAGNOSIS — Z12.5 ENCOUNTER FOR SCREENING FOR MALIGNANT NEOPLASM OF PROSTATE: ICD-10-CM

## 2022-08-22 LAB
ANION GAP SERPL CALC-SCNC: 12 MMOL/L
BILIRUB UR QL STRIP: NEGATIVE
BUN SERPL-MCNC: 16 MG/DL
CALCIUM SERPL-MCNC: 9.8 MG/DL
CHLORIDE SERPL-SCNC: 100 MMOL/L
CLARITY UR: CLEAR
CO2 SERPL-SCNC: 27 MMOL/L
COLLECTION METHOD: NORMAL
CREAT SERPL-MCNC: 0.88 MG/DL
EGFR: 95 ML/MIN/1.73M2
GLUCOSE SERPL-MCNC: 145 MG/DL
GLUCOSE UR-MCNC: NEGATIVE
HCG UR QL: 0.2 EU/DL
HGB UR QL STRIP.AUTO: NORMAL
KETONES UR-MCNC: NEGATIVE
LEUKOCYTE ESTERASE UR QL STRIP: NEGATIVE
NITRITE UR QL STRIP: NEGATIVE
PH UR STRIP: 5.5
POTASSIUM SERPL-SCNC: 4.2 MMOL/L
PROT UR STRIP-MCNC: NEGATIVE
PSA SERPL-MCNC: 0.66 NG/ML
SODIUM SERPL-SCNC: 139 MMOL/L
SP GR UR STRIP: 1.02

## 2022-08-22 PROCEDURE — 51798 US URINE CAPACITY MEASURE: CPT

## 2022-08-22 PROCEDURE — 99205 OFFICE O/P NEW HI 60 MIN: CPT

## 2022-08-22 PROCEDURE — 81003 URINALYSIS AUTO W/O SCOPE: CPT | Mod: QW

## 2022-08-22 NOTE — HISTORY OF PRESENT ILLNESS
[FreeTextEntry1] : Reason for Visit: Urinary Urgency and urge incontinence, \par \par This is a 66 year-year-old gentleman with urinary urgency and urge incontinence he reports urgency with incontinence.  He is a  and occasionally when he is not able to empty his bladder will have episodes of urinary leakage.  He also reports a distant episode of prostatitis patient is diabetic appears to be well controlled patient also has pain in his right inguinal area and radiates to his back no history of kidney stones he denies gross hematuria no smoking history he denies any history of urinary retention he is not on any prostate medications.  He is not on any bladder medications.  He denies recurrent prostatitis it seems to be an isolated episode.\par \par Patient is referred for evaluation of his condition. Patient denies any gross hematuria or dysuria or urinary incontinence. The patient denies any aggravating or relieving factors. The patient denies any interference of function. The patient is entirely asymptomatic. All other review of systems are negative. Past medical history, family history and social history were inquired and were noncontributory to current condition. Patient [denies tobacco use]. Medications and allergies were reviewed.\par \par On examination, the patient is a healthy-appearing gentleman in no acute distress. He is alert and oriented follows commands. He has normal mood and affect. He is normocephalic. Oral no thrush. Neck is supple. Respirations are unlabored. His abdomen is soft and nontender. Liver is nonpalpable. Bladder is nonpalpable. No CVA tenderness. Neurologically he is grossly intact. No peripheral edema. Skin without gross abnormality. He has normal male external genitalia. Normal meatus. Bilateral testes are descended intrascrotally and normal to palpation. On rectal examination, there is normal sphincter tone.  He has bilateral inguinal hernias on the right side is much larger than the left side the prostate 30 grams   is clinically benign without focal induration or nodularity.\par \par His ultrasound postvoid residual today is 9 mL.\par \par Assessment: urgency with urge incon incontinence right inguinal and left inguinal hernias right larger than left probably the cause of his testicular discomfort\par \par Plan PSA A1c metabolic panel today start anticholinergic trial with oxybutynin ER follow-up as needed.\par \par I counseled the patient. I discussed the various etiologies of his symptoms. []. Risks and alternatives were discussed. I answered the patient questions. The patient will follow-up as directed and will contact me with any questions or concerns. Thank you for the opportunity to participate in the care of this patient. I'll keep you updated on his progress.\par \par

## 2022-08-23 ENCOUNTER — NON-APPOINTMENT (OUTPATIENT)
Age: 67
End: 2022-08-23

## 2022-08-23 LAB
BACTERIA UR CULT: NORMAL
ESTIMATED AVERAGE GLUCOSE: 140 MG/DL
HBA1C MFR BLD HPLC: 6.5 %

## 2022-08-31 ENCOUNTER — NON-APPOINTMENT (OUTPATIENT)
Age: 67
End: 2022-08-31

## 2022-08-31 ENCOUNTER — APPOINTMENT (OUTPATIENT)
Dept: INTERNAL MEDICINE | Facility: CLINIC | Age: 67
End: 2022-08-31

## 2022-08-31 VITALS
TEMPERATURE: 98 F | RESPIRATION RATE: 16 BRPM | HEART RATE: 69 BPM | HEIGHT: 66 IN | BODY MASS INDEX: 21.86 KG/M2 | WEIGHT: 136 LBS | OXYGEN SATURATION: 99 %

## 2022-08-31 VITALS — DIASTOLIC BLOOD PRESSURE: 72 MMHG | SYSTOLIC BLOOD PRESSURE: 128 MMHG

## 2022-08-31 PROCEDURE — 36415 COLL VENOUS BLD VENIPUNCTURE: CPT

## 2022-08-31 PROCEDURE — 93000 ELECTROCARDIOGRAM COMPLETE: CPT

## 2022-08-31 PROCEDURE — 99397 PER PM REEVAL EST PAT 65+ YR: CPT | Mod: 25

## 2022-08-31 NOTE — PHYSICAL EXAM
[No Acute Distress] : no acute distress [Well Nourished] : well nourished [Normal Sclera/Conjunctiva] : normal sclera/conjunctiva [PERRL] : pupils equal round and reactive to light [Normal Outer Ear/Nose] : the outer ears and nose were normal in appearance [Normal Oropharynx] : the oropharynx was normal [No JVD] : no jugular venous distention [No Lymphadenopathy] : no lymphadenopathy [Supple] : supple [No Respiratory Distress] : no respiratory distress  [No Accessory Muscle Use] : no accessory muscle use [Regular Rhythm] : with a regular rhythm [No Carotid Bruits] : no carotid bruits [No Edema] : there was no peripheral edema [No Extremity Clubbing/Cyanosis] : no extremity clubbing/cyanosis [Soft] : abdomen soft [Non Tender] : non-tender [Non-distended] : non-distended [No Masses] : no abdominal mass palpated [Normal Bowel Sounds] : normal bowel sounds [Declined Rectal Exam] : declined rectal exam [Normal Posterior Cervical Nodes] : no posterior cervical lymphadenopathy [Normal Anterior Cervical Nodes] : no anterior cervical lymphadenopathy [No Spinal Tenderness] : no spinal tenderness [Grossly Normal Strength/Tone] : grossly normal strength/tone [No Rash] : no rash [No Focal Deficits] : no focal deficits [Normal Affect] : the affect was normal [Normal Insight/Judgement] : insight and judgment were intact [Comprehensive Foot Exam Normal] : Right and left foot were examined and both feet are normal. No ulcers in either foot. Toes are normal and with full ROM.  Normal tactile sensation with monofilament testing throughout both feet

## 2022-08-31 NOTE — REVIEW OF SYSTEMS
[Hesitancy] : hesitancy [Frequency] : frequency [Fever] : no fever [Chills] : no chills [Discharge] : no discharge [Pain] : no pain [Vision Problems] : no vision problems [Earache] : no earache [Sore Throat] : no sore throat [Chest Pain] : no chest pain [Palpitations] : no palpitations [Lower Ext Edema] : no lower extremity edema [Shortness Of Breath] : no shortness of breath [Wheezing] : no wheezing [Cough] : no cough [Abdominal Pain] : no abdominal pain [Vomiting] : no vomiting [Dysuria] : no dysuria [Hematuria] : no hematuria [Joint Pain] : no joint pain [Joint Stiffness] : no joint stiffness [Mole Changes] : no mole changes [Skin Rash] : no skin rash [Headache] : no headache [Dizziness] : no dizziness [Fainting] : no fainting [Confusion] : no confusion [Unsteady Walk] : no ataxia [Memory Loss] : no memory loss [Depression] : no depression [Easy Bruising] : no easy bruising

## 2022-08-31 NOTE — HISTORY OF PRESENT ILLNESS
[de-identified] : Pt comes for adult well visit  Just saw urologist and endo .  Pt feels well except hernia is bothersome. Will be seeing surgeon.

## 2022-08-31 NOTE — HEALTH RISK ASSESSMENT
[Never] : Never [Never (0 pts)] : Never (0 points) [No] : In the past 12 months have you used drugs other than those required for medical reasons? No [No falls in past year] : Patient reported no falls in the past year [0] : 2) Feeling down, depressed, or hopeless: Not at all (0) [PHQ-2 Negative - No further assessment needed] : PHQ-2 Negative - No further assessment needed [Audit-CScore] : 0 [[id number]] : 0

## 2022-09-05 LAB
CHOLEST SERPL-MCNC: 134 MG/DL
HDLC SERPL-MCNC: 42 MG/DL
LDLC SERPL CALC-MCNC: 76 MG/DL
NONHDLC SERPL-MCNC: 92 MG/DL
TRIGL SERPL-MCNC: 78 MG/DL
TSH SERPL-ACNC: 1.1 UIU/ML

## 2022-09-12 ENCOUNTER — NON-APPOINTMENT (OUTPATIENT)
Age: 67
End: 2022-09-12

## 2022-09-12 ENCOUNTER — APPOINTMENT (OUTPATIENT)
Dept: SURGERY | Facility: CLINIC | Age: 67
End: 2022-09-12

## 2022-09-12 VITALS
HEART RATE: 69 BPM | DIASTOLIC BLOOD PRESSURE: 77 MMHG | WEIGHT: 138 LBS | TEMPERATURE: 98.8 F | SYSTOLIC BLOOD PRESSURE: 132 MMHG | HEIGHT: 66 IN | BODY MASS INDEX: 22.18 KG/M2

## 2022-09-12 PROCEDURE — 99203 OFFICE O/P NEW LOW 30 MIN: CPT

## 2022-09-12 NOTE — HISTORY OF PRESENT ILLNESS
[de-identified] : 65 yo male with h/o DM, HLD, CAD s/p stent presents for evaluation of right groin discomfort. He was seen by his PMD as well as Urologist who both told him he had a hernia. He denies hernia repair in the past. No prior abdominal surgery. Denies nausea or vomiting.

## 2022-09-12 NOTE — PHYSICAL EXAM
[Respiratory Effort] : normal respiratory effort [Alert] : alert [Oriented to Person] : oriented to person [Oriented to Place] : oriented to place [Oriented to Time] : oriented to time [Calm] : calm [JVD] : no jugular venous distention  [Abdominal Masses] : No abdominal masses [Abdomen Tenderness] : ~T ~M No abdominal tenderness [de-identified] : CHRIST MA NAD [de-identified] : EOMI [de-identified] : soft NT, ND + RIH and smaller ? LIH

## 2022-09-12 NOTE — ASSESSMENT
[FreeTextEntry1] : 65 yo male with RIH and possible smaller LIH will schedule out patient laps repair.

## 2022-09-15 ENCOUNTER — NON-APPOINTMENT (OUTPATIENT)
Age: 67
End: 2022-09-15

## 2022-09-16 ENCOUNTER — NON-APPOINTMENT (OUTPATIENT)
Age: 67
End: 2022-09-16

## 2022-09-16 ENCOUNTER — APPOINTMENT (OUTPATIENT)
Dept: SURGERY | Facility: CLINIC | Age: 67
End: 2022-09-16

## 2022-09-16 VITALS
HEART RATE: 69 BPM | DIASTOLIC BLOOD PRESSURE: 70 MMHG | HEIGHT: 66 IN | TEMPERATURE: 97 F | SYSTOLIC BLOOD PRESSURE: 110 MMHG | OXYGEN SATURATION: 96 % | BODY MASS INDEX: 22.34 KG/M2 | WEIGHT: 139 LBS

## 2022-09-16 PROCEDURE — 99205 OFFICE O/P NEW HI 60 MIN: CPT

## 2022-09-16 RX ORDER — DICLOFENAC SODIUM 75 MG/1
75 TABLET, DELAYED RELEASE ORAL
Qty: 14 | Refills: 0 | Status: DISCONTINUED | COMMUNITY
Start: 2021-09-17 | End: 2022-09-16

## 2022-09-16 RX ORDER — AZELASTINE HYDROCHLORIDE 0.5 MG/ML
0.05 SOLUTION/ DROPS OPHTHALMIC TWICE DAILY
Qty: 3 | Refills: 1 | Status: DISCONTINUED | COMMUNITY
Start: 2022-05-17 | End: 2022-09-16

## 2022-09-20 NOTE — HISTORY OF PRESENT ILLNESS
[de-identified] : Very pleasant though admittedly anxious gentleman with ~ 1 month history of previously diagnosed right inguinal hernia.\par He denies angelika pain, but admits to a sensation of mild "awareness... pressure..."\par Mr. Ivory states that he was told of the presence of the hernia by his PMD with confirmation by his Urologist.\par He denies any obvious associated or ongoing systemic or GI or  complains.\par Mr. Ivory reports to me that he is undecided regarding repair and wishes for additional information or "options" regarding his care...

## 2022-09-20 NOTE — PLAN
[FreeTextEntry1] : Small and minimally symptomatic right inguinal hernia.  \par \par Given Mr. Ivory's relatively young age as well as his understandable anxiety, at least consideration of operative intervention does seem indicated and appropriate.\par \par The risks, benefits and nature of the operative intervention have been reviewed at length with Mr. IVORY , including but not limited to the approximate size and location of the estimated or typical operative incision, the planned utilization of a permanent mesh, the possibility of urinary retention during the perioperative period, the possibility of infection, the possibility of chronic groin pain or perineural scarring or paresthesias or loss of sensation, the possibility of future recurrence and the likelihood of a visually noticeable “scar”/ incision with residual abdominal wall asymmetry.  \par \par Mr. IVORY  understands that a period of recovery and cessation of vigorous activity will be required.  Mr. IVORY demonstrates overall good overall insight but is hesitant to proceed.  While there are no further specific concerns presently, I have welcomed follow-up with me and my office at any time in the interim to discuss any future questions.  \par \par We have discussed that the risk of surgical emergency is low.  However, he understands the more significant risk of progression of the hernia in terms of size or symptoms.  He understands that if ongoing observation with watchful waiting is his desire, than a RTO in 3 months is required to reassess his progress/ state and re-examine the hernia.  He is pleased and agrees.\par \par Should the hernia progress, I would then strongly advocate for timely elective repair at his convenience.  This operative intervention can then follow any standard pre-surgical testing or medical clearance.\par \par Please note that more than 50% of face to face time was spent in counseling and coordination of care.

## 2022-09-20 NOTE — REVIEW OF SYSTEMS
[As Noted in HPI] : as noted in HPI [Anxiety] : anxiety [Negative] : Heme/Lymph [Feeling Poorly] : not feeling poorly [Feeling Tired] : not feeling tired [Depression] : no depression [de-identified] : regarding this issue and visit...

## 2022-09-20 NOTE — DATA REVIEWED
[FreeTextEntry1] : EXAM:  CT RENAL STONE HUNT                      \par \par PROCEDURE DATE:  10/19/2019  \par \par INTERPRETATION:  CLINICAL INFORMATION: Flank pain. Evaluate for renal \par stone.\par \par COMPARISON: Prior CT scan of the abdomen and pelvis from 12/10/2011\par \par PROCEDURE: \par CT of the Abdomen and Pelvis was performed without intravenous contrast. \par Intravenous contrast: None.\par Oral contrast: None.\par Sagittal and coronal reformats were performed.\par \par FINDINGS:\par \par LOWER CHEST: Linear atelectasis at the lung bases.\par LIVER: Within normal limits.\par BILE DUCTS: Normal caliber.\par GALLBLADDER: Within normal limits.\par SPLEEN: Within normal limits.\par PANCREAS: Within normal limits.\par ADRENALS: Within normal limits.\par KIDNEYS/URETERS: No renal stones or hydronephrosis.\par BLADDER: Within normal limits.\par REPRODUCTIVE ORGANS: Prostate gland is enlarged.\par BOWEL: No bowel obstruction. Appendix within normal limits. Large amount \par of stool in the ascending, transverse and descending colon.\par PERITONEUM: No ascites.\par VESSELS: Trace vascular calcifications.\par RETROPERITONEUM/LYMPH NODES: No lymphadenopathy.  \par ABDOMINAL WALL: Within normal limits.\par BONES: Mild degenerative changes of bone.\par \par IMPRESSION: \par \par No renal stones or hydronephrosis. No acute intra-abdominal pathology \par visualized.\par \par Large amount of stool in the ascending, transverse and descending colon.\par \par CORNELIO BATRES M.D., ATTENDING RADIOLOGIST\par This document has been electronically signed. Oct 19 2019  1:10PM\par

## 2022-09-20 NOTE — PHYSICAL EXAM
[Respiratory Effort] : normal respiratory effort [Normal Rate and Rhythm] : normal rate and rhythm [No HSM] : no hepatosplenomegaly [No Rash or Lesion] : No rash or lesion [Alert] : alert [Oriented to Person] : oriented to person [Oriented to Place] : oriented to place [Oriented to Time] : oriented to time [Anxious] : anxious [Tender] : was nontender [Enlarged] : not enlarged [de-identified] : Appears well, no acute distress, ambulates easily into office and assumes examination table without need of assistance.  [de-identified] : Normocephalic and atraumatic.  [de-identified] : Supple with full range of motion.  [FreeTextEntry1] : No cervical, supraclavicular, axillary or inguinal adenopathy.  [de-identified] : No visible lesions or palpable masses. [de-identified] : Epigastrium WNL.\par Periumbilical fascia intact.\par Right groin with small inguinal hernia noted only during Valsalva type maneuvers.\par Left inguinal space intact at time of this exam.\par Bilateral femoral and Spigelian hernia spaces unremarkable upon additional focused Valsalva challenges. [de-identified] : Penis free of lesions.  Cords free of cysts.  Right testicle slightly smaller and more high riding than left.  Both free of masses. [de-identified] : Deferred.  [de-identified] : Grossly symmetric and within normal limits without motor or sensory deficits.  [de-identified] : though appropriately so...

## 2022-09-30 ENCOUNTER — APPOINTMENT (OUTPATIENT)
Dept: RADIOLOGY | Facility: CLINIC | Age: 67
End: 2022-09-30

## 2022-09-30 ENCOUNTER — APPOINTMENT (OUTPATIENT)
Dept: INTERNAL MEDICINE | Facility: CLINIC | Age: 67
End: 2022-09-30

## 2022-09-30 ENCOUNTER — OUTPATIENT (OUTPATIENT)
Dept: OUTPATIENT SERVICES | Facility: HOSPITAL | Age: 67
LOS: 1 days | End: 2022-09-30
Payer: COMMERCIAL

## 2022-09-30 VITALS
TEMPERATURE: 97.9 F | HEIGHT: 66 IN | BODY MASS INDEX: 22.18 KG/M2 | WEIGHT: 138 LBS | OXYGEN SATURATION: 97 % | HEART RATE: 84 BPM

## 2022-09-30 VITALS — SYSTOLIC BLOOD PRESSURE: 110 MMHG | DIASTOLIC BLOOD PRESSURE: 70 MMHG

## 2022-09-30 DIAGNOSIS — M25.551 PAIN IN RIGHT HIP: ICD-10-CM

## 2022-09-30 DIAGNOSIS — Z98.89 OTHER SPECIFIED POSTPROCEDURAL STATES: Chronic | ICD-10-CM

## 2022-09-30 PROCEDURE — 90662 IIV NO PRSV INCREASED AG IM: CPT

## 2022-09-30 PROCEDURE — 99213 OFFICE O/P EST LOW 20 MIN: CPT | Mod: 25

## 2022-09-30 PROCEDURE — 90471 IMMUNIZATION ADMIN: CPT

## 2022-09-30 PROCEDURE — 73502 X-RAY EXAM HIP UNI 2-3 VIEWS: CPT

## 2022-09-30 PROCEDURE — 73502 X-RAY EXAM HIP UNI 2-3 VIEWS: CPT | Mod: 26,RT

## 2022-09-30 NOTE — PHYSICAL EXAM
[No Acute Distress] : no acute distress [Normal Sclera/Conjunctiva] : normal sclera/conjunctiva [Normal Outer Ear/Nose] : the outer ears and nose were normal in appearance [No JVD] : no jugular venous distention [No Respiratory Distress] : no respiratory distress  [Normal Rate] : normal rate  [Regular Rhythm] : with a regular rhythm [No Edema] : there was no peripheral edema [de-identified] : right hip pain on rom

## 2022-10-19 ENCOUNTER — RX RENEWAL (OUTPATIENT)
Age: 67
End: 2022-10-19

## 2022-11-07 ENCOUNTER — APPOINTMENT (OUTPATIENT)
Dept: ENDOCRINOLOGY | Facility: CLINIC | Age: 67
End: 2022-11-07

## 2022-11-07 VITALS
BODY MASS INDEX: 21.69 KG/M2 | WEIGHT: 135 LBS | SYSTOLIC BLOOD PRESSURE: 124 MMHG | HEART RATE: 86 BPM | OXYGEN SATURATION: 100 % | HEIGHT: 66 IN | DIASTOLIC BLOOD PRESSURE: 69 MMHG

## 2022-11-07 PROCEDURE — 99214 OFFICE O/P EST MOD 30 MIN: CPT

## 2022-11-15 ENCOUNTER — NON-APPOINTMENT (OUTPATIENT)
Age: 67
End: 2022-11-15

## 2022-11-16 ENCOUNTER — APPOINTMENT (OUTPATIENT)
Dept: ORTHOPEDIC SURGERY | Facility: CLINIC | Age: 67
End: 2022-11-16

## 2022-11-16 ENCOUNTER — NON-APPOINTMENT (OUTPATIENT)
Age: 67
End: 2022-11-16

## 2022-11-16 DIAGNOSIS — M62.830 MUSCLE SPASM OF BACK: ICD-10-CM

## 2022-11-16 DIAGNOSIS — M25.511 PAIN IN RIGHT SHOULDER: ICD-10-CM

## 2022-11-16 DIAGNOSIS — M67.911 UNSPECIFIED DISORDER OF SYNOVIUM AND TENDON, RIGHT SHOULDER: ICD-10-CM

## 2022-11-16 PROCEDURE — 99203 OFFICE O/P NEW LOW 30 MIN: CPT

## 2022-11-16 NOTE — DISCUSSION/SUMMARY
[de-identified] : Discussed findings of today's exam and possible causes of patient's pain.  Educated patient on their most probable diagnosis of right shoulder impingement and lumbar muscle spasm status post fall.  Reviewed possible courses of treatment, and we collaboratively decided best course of treatment at this time will include conservative management.  Patient has no evidence of high-grade or complete rotator cuff tear, no need for MRI at this time.  Patient is not having any radiculopathy, no need for x-ray or MRI of the lumbar spine.  Patient will be started on a course of physical therapy to restore normal range of motion and strength as tolerated.  Patient started on a course of oral NSAIDs, prescription given for Naprosyn (We discussed all possible side effects of this medication).  Follow up as needed.  Patient appreciates and agrees with current plan.\par \par I work as part of an academic orthopedic group and routinely have a physician in training (resident / fellow) working with me.  Any part of the history and physical exam performed by the physician in training was either directly reviewed and/or replicated by myself.  Any procedure performed by the physician in training was performed under my direct supervision and with the consent of the patient.\par \par This note was generated using dragon medical dictation software.  A reasonable effort has been made for proofreading its contents, but typos may still remain.  If there are any questions or points of clarification needed please notify my office.\par

## 2022-11-16 NOTE — HISTORY OF PRESENT ILLNESS
[de-identified] : Patient is here for right shoulder pain. Patient reports he fell down a few stair 2 weeks ago. Unsure of direct trauma to the shoulder but pain started after the fall. Rates it at a 7/10. Pain is in the front of the shoulder and radiated down the medial and lateral arm. denies numbness and tingling but admits to weakness. He has tried Advil which did not help. No recent images or PT started. \par Patient is also having some intermittent low back pain since his fall.  It stays mainly in the lumbar area, does not radiate into the buttocks or down the legs.  No numbness/tingling, no bowel/bladder incontinence, no saddle anesthesia.\par \par The patient's past medical history, past surgical history, medications and allergies were reviewed by me today and documented accordingly. In addition, the patient's family and social history, which were noncontributory to this visit, were reviewed also. Intake form was reviewed. The patient has no family history of arthritis.

## 2022-11-16 NOTE — PHYSICAL EXAM
[de-identified] : Constitutional: Well-nourished, well-developed, No acute distress\par Respiratory:  Good respiratory effort, no SOB\par Psychiatric: Pleasant and normal affect, alert and oriented x3\par Skin: Clean dry and intact B/L UE\par Musculoskeletal: normal except where as noted in regional exam\par \par \par Left Shoulder:\par APPEARANCE: no marked deformities, no swelling or malalignment\par POSITIVE TENDERNESS: none\par NONTENDER: supraspinatus, infraspinatus, teres minor, LH biceps, anterior and posterior capsule, AC joint\par ROM: full & painless, no scapular winging or dyskinesia present\par RESISTIVE TESTING: painless 5/5 resisted flex/ext, empty can/ER/IR, horizontal abd/add \par SPECIAL TESTS: neg Drop Arm, neg Empty Can, neg Dong/Neers, neg Baldwin's, neg Speeds, neg Apprehension, neg cross arm adduction, neg apley's scratch test\par \par Right Shoulder:\par APPEARANCE: no marked deformities, no swelling or malalignment\par POSITIVE TENDERNESS: supraspinatus, long head biceps tendon\par NONTENDER:  infraspinatus, teres minor. biceps. anterior and posterior capsule. AC joint. \par ROM: full with mild painful arc past 60 degrees, no scapular winging or dyskinesia present\par RESISTIVE TESTING: MMT 4+/5 ER, Flexion and Empty can, 5/5 IR. painless 5/5 resisted ext, horizontal abd/add \par SPECIAL TESTS: + Dong and Neers, mildly + cross arm adduction, + Speeds, neg Baldwin's, neg Drop Arm, neg Apprehension. neg apley's scratch test\par \par Lumbar Spine Exam\par \par APPEARANCE: no marked deformities or malalignment, normal curvature of the lumbosacral spine. good posture\par POSITIVE TENDERNESS: + Bilateral lumbar tenderness and spasm noted in erector spinae and quadratus lumborum\par NONTENDER: no bony midline tenderness\par ROM: full, although painful at end range of flexion and extension\par RESISTIVE TESTING: mildly painful 4/5 resisted flex/ext, sidebending b/l, and rotation\par SPECIAL TESTS: neg SLR b/l, neg BENY b/l, neg Trendelenburg b/l \par PULSES: 2+ DP/PT pulses\par NEURO:  L1 - S2 intact to sensation and motor, DTRs 2+/4 patella and achilles\par  [de-identified] : \par The following radiographs were ordered and read by me during this patient's visit. I reviewed each radiograph in detail with the patient and discussed the findings as highlighted below. \par \par 3 views of the right shoulder were obtained today that show no fracture, or dislocation. There are no degenerative changes seen. There is no malalignment. No obvious osseous abnormality. Otherwise unremarkable.

## 2022-11-16 NOTE — CONSULT LETTER
[Dear  ___] : Dear  [unfilled], [Consult Letter:] : I had the pleasure of evaluating your patient, [unfilled]. [Please see my note below.] : Please see my note below. [Sincerely,] : Sincerely, [FreeTextEntry2] : PCP [FreeTextEntry3] : Nuno Flor DO, ATC\par Primary Care Sports Medicine\par [facility] [place]

## 2022-11-18 ENCOUNTER — NON-APPOINTMENT (OUTPATIENT)
Age: 67
End: 2022-11-18

## 2022-11-18 ENCOUNTER — APPOINTMENT (OUTPATIENT)
Dept: CARDIOLOGY | Facility: CLINIC | Age: 67
End: 2022-11-18

## 2022-11-18 VITALS
OXYGEN SATURATION: 93 % | HEIGHT: 66 IN | HEART RATE: 94 BPM | BODY MASS INDEX: 22.18 KG/M2 | SYSTOLIC BLOOD PRESSURE: 154 MMHG | DIASTOLIC BLOOD PRESSURE: 93 MMHG | WEIGHT: 138 LBS

## 2022-11-18 PROCEDURE — 99214 OFFICE O/P EST MOD 30 MIN: CPT

## 2022-11-18 PROCEDURE — 93000 ELECTROCARDIOGRAM COMPLETE: CPT

## 2022-11-18 NOTE — DISCUSSION/SUMMARY
[FreeTextEntry1] : 67 year man with a history as listed presents for a followup cardiac evaluation. \par Aren is doing well. He denies any anginal symptoms. Clinically he is euvolemic on exam. His EKG did not reveal any significant ischemic changes. I will defer any testing for now. He will continue ASA for his PCI. \par His blood pressure is elevated. I would start him on Olmesartan 5mg Qday. He will try to maintain a BP log at home. Reducing dietary salt intake advised. Check BMP in 1 months. \par He will continue with statin therapy to achieve maintain goal LDL<100 or ideally <70. \par He will followup with endocrine. \par Exercise and diet counseling was performed in order to reduce her future cardiovascular risk. \par He will followup with me in 6 months or sooner if necessary.  [EKG obtained to assist in diagnosis and management of assessed problem(s)] : EKG obtained to assist in diagnosis and management of assessed problem(s)

## 2022-11-18 NOTE — HISTORY OF PRESENT ILLNESS
[FreeTextEntry1] : 67 year old man with a history of CAD s/p PCI in 2012 to LAD, HFPEF,  DM, HLD, GERD, presetns for a followup visit. \par \par He was seeing Dr. Coy in the past. \par He   denies any chest pain, dyspnea,  PND, orthopnea, lower extremity edema, near syncope, syncope, strokelike symptoms.  He exercise with the treadmill at 4mph for about 20 minutes without any symptoms. Medication reconciliation performed. He is compliant with his medications.

## 2022-11-18 NOTE — CARDIOLOGY SUMMARY
[de-identified] : SR [de-identified] : 4/19/21 nuc 10 METs, normal SPECT [de-identified] : 2017 normal LV function.  [de-identified] : 2018 mild disease

## 2022-11-19 ENCOUNTER — RX RENEWAL (OUTPATIENT)
Age: 67
End: 2022-11-19

## 2022-12-13 ENCOUNTER — APPOINTMENT (OUTPATIENT)
Dept: SURGERY | Facility: CLINIC | Age: 67
End: 2022-12-13

## 2022-12-13 VITALS
OXYGEN SATURATION: 96 % | TEMPERATURE: 97.1 F | WEIGHT: 140 LBS | HEIGHT: 66 IN | DIASTOLIC BLOOD PRESSURE: 80 MMHG | SYSTOLIC BLOOD PRESSURE: 130 MMHG | HEART RATE: 88 BPM | BODY MASS INDEX: 22.5 KG/M2

## 2022-12-13 PROCEDURE — 99214 OFFICE O/P EST MOD 30 MIN: CPT

## 2022-12-13 RX ORDER — TROSPIUM CHLORIDE 20 MG/1
20 TABLET, FILM COATED ORAL
Qty: 30 | Refills: 2 | Status: DISCONTINUED | COMMUNITY
Start: 2022-08-22 | End: 2022-12-13

## 2022-12-13 RX ORDER — DICLOFENAC SODIUM 75 MG/1
75 TABLET, DELAYED RELEASE ORAL
Qty: 20 | Refills: 0 | Status: DISCONTINUED | COMMUNITY
Start: 2022-09-30 | End: 2022-12-13

## 2022-12-14 NOTE — REVIEW OF SYSTEMS
[As Noted in HPI] : as noted in HPI [Anxiety] : anxiety [Negative] : Heme/Lymph [Feeling Poorly] : not feeling poorly [Feeling Tired] : not feeling tired [Depression] : no depression [de-identified] : regarding this issue and visit, though much less so...

## 2022-12-14 NOTE — PLAN
[FreeTextEntry1] : Small and minimally symptomatic right inguinal hernia.  \par Left groin not impressive, though mild laxity? noted.\par Given Mr. Ivory's relatively young age, consideration of operative intervention does seem appropriate.\par \par The risks, benefits and nature of the operative intervention have been re-reviewed at length with Mr. IVORY.  \par \par In light of his lack of current complaints and now my essentially stable exam, he wishes to continue ongoing observation with watchful waiting.  \par \par We have reviewed that the risk of surgical emergency is low.  However, he does understand the more significant risk of progression of the hernia in terms of size or symptoms.  He understands that if ongoing observation with watchful waiting is his desire, than a RTO in 6 months is required to reassess his progress/ state and re-examine the hernia.  \par \par He is once again pleased and agrees.\par \par Should the hernia progress, I would advocate for elective repair at his convenience.\par \par Please note that more than 50% of face to face time was spent in counseling and coordination of care.

## 2022-12-14 NOTE — PHYSICAL EXAM
[Respiratory Effort] : normal respiratory effort [Normal Rate and Rhythm] : normal rate and rhythm [No HSM] : no hepatosplenomegaly [No Rash or Lesion] : No rash or lesion [Alert] : alert [Oriented to Person] : oriented to person [Oriented to Place] : oriented to place [Oriented to Time] : oriented to time [Tender] : was nontender [Enlarged] : not enlarged [Calm] : calm [de-identified] : Appears well, no acute distress, ambulates easily into the office.  [de-identified] : Remains normocephalic and atraumatic.  [de-identified] : Still supple with full range of motion.  [FreeTextEntry1] : No palpable cervical, supraclavicular, axillary or inguinal adenopathy.  [de-identified] : No visible lesion or palpable mass. [de-identified] : Epigastrium WNL- stable.\par Periumbilical fascia intact- no change.\par Right groin with small inguinal hernia noted only during Valsalva type maneuvers- stable.\par Left inguinal space intact at time of this exam +/- minimal laxity? without angelika or obvious defect.\par Bilateral femoral and Spigelian hernia spaces unremarkable upon additional focused Valsalva challenges. [de-identified] : Penis free of lesion.  Cords free of cyst.  Right testicle slightly smaller and more high riding than left.  Both free of masses.  All stable. [de-identified] : Deferred.  [de-identified] : Grossly symmetric and within normal limits without motor or sensory deficit.

## 2022-12-14 NOTE — HISTORY OF PRESENT ILLNESS
[de-identified] : Very pleasant though admittedly anxious gentleman with ~ 1 month history of previously diagnosed right inguinal hernia.\par He denies angelika pain, but admits to a sensation of mild "awareness... pressure..."\par Mr. Ivory states that he was told of the presence of the hernia by his PMD with confirmation by his Urologist.\par He denies any obvious associated or ongoing systemic or GI or  complains.\par Mr. Ivory reports to me that he is undecided regarding repair and wishes for additional information or "options" regarding his care... [de-identified] : Very pleasant gentleman with  previously diagnosed right inguinal hernia.\par Presenting to office today for routine ongoing follow-up visit.\par He denies angelika pain and denies further sensations or events of any awareness/ pressure...\par He denies any obvious associated or ongoing systemic or GI or  complains.\par Mr. Ivory reports to me that he is leaning towards ongoing observation with watchful waiting for the present but wishes "to continue in follow-up."

## 2022-12-22 ENCOUNTER — NON-APPOINTMENT (OUTPATIENT)
Age: 67
End: 2022-12-22

## 2022-12-22 ENCOUNTER — APPOINTMENT (OUTPATIENT)
Dept: CARDIOLOGY | Facility: CLINIC | Age: 67
End: 2022-12-22

## 2022-12-22 VITALS
HEIGHT: 66 IN | HEART RATE: 83 BPM | WEIGHT: 142 LBS | OXYGEN SATURATION: 98 % | BODY MASS INDEX: 22.82 KG/M2 | SYSTOLIC BLOOD PRESSURE: 125 MMHG | DIASTOLIC BLOOD PRESSURE: 79 MMHG

## 2022-12-22 DIAGNOSIS — I20.8 OTHER FORMS OF ANGINA PECTORIS: ICD-10-CM

## 2022-12-22 PROCEDURE — 99214 OFFICE O/P EST MOD 30 MIN: CPT

## 2022-12-22 PROCEDURE — 93000 ELECTROCARDIOGRAM COMPLETE: CPT

## 2022-12-22 NOTE — DISCUSSION/SUMMARY
[FreeTextEntry1] : 67 year man with a history as listed presents for a followup cardiac evaluation. \par Aren is complaining of atypical anginal symptoms. He will undergo a nuclear stress test to assess for underlying obstructive CAD.  He will get a 2d echo to rule out underlying structural heart disease. He denies any anginal symptoms. Clinically he is euvolemic on exam. His EKG did not reveal any significant ischemic changes. \par His blood pressure is controlled. He will continue  Olmesartan 5mg Qday. He will try to maintain a BP log at home. Reducing dietary salt intake advised.  \par He will continue with statin therapy to achieve maintain goal LDL<100 or ideally <70. \par He will followup with endocrine. \par Exercise and diet counseling was performed in order to reduce her future cardiovascular risk. \par He will followup with me in 6 months or sooner if necessary.  [EKG obtained to assist in diagnosis and management of assessed problem(s)] : EKG obtained to assist in diagnosis and management of assessed problem(s)

## 2022-12-22 NOTE — HISTORY OF PRESENT ILLNESS
[FreeTextEntry1] : 67 year old man with a history of CAD s/p PCI in 2012 to LAD, HFPEF,  DM, HLD, GERD, presetns for a followup visit. \par \par Since his last visit, he had two episodes of numbness on his cheek and jaw that was nonexertional, self limiting. \par He   denies any chest pain, dyspnea,  PND, orthopnea, lower extremity edema, near syncope, syncope, strokelike symptoms.  He exercise with the treadmill at 4mph for about 20 minutes without any symptoms. Medication reconciliation performed. He is compliant with his medications.

## 2022-12-22 NOTE — CARDIOLOGY SUMMARY
[de-identified] : SR [de-identified] : 4/19/21 nuc 10 METs, normal SPECT [de-identified] : 2017 normal LV function.  [de-identified] : 2018 mild disease

## 2022-12-27 ENCOUNTER — APPOINTMENT (OUTPATIENT)
Dept: CARDIOLOGY | Facility: CLINIC | Age: 67
End: 2022-12-27

## 2022-12-27 PROCEDURE — 93306 TTE W/DOPPLER COMPLETE: CPT

## 2023-01-16 ENCOUNTER — RX RENEWAL (OUTPATIENT)
Age: 68
End: 2023-01-16

## 2023-01-18 ENCOUNTER — APPOINTMENT (OUTPATIENT)
Dept: CARDIOLOGY | Facility: CLINIC | Age: 68
End: 2023-01-18

## 2023-01-22 ENCOUNTER — RX RENEWAL (OUTPATIENT)
Age: 68
End: 2023-01-22

## 2023-02-01 ENCOUNTER — NON-APPOINTMENT (OUTPATIENT)
Age: 68
End: 2023-02-01

## 2023-02-01 DIAGNOSIS — Z01.812 ENCOUNTER FOR PREPROCEDURAL LABORATORY EXAMINATION: ICD-10-CM

## 2023-02-06 ENCOUNTER — APPOINTMENT (OUTPATIENT)
Dept: INTERNAL MEDICINE | Facility: CLINIC | Age: 68
End: 2023-02-06
Payer: COMMERCIAL

## 2023-02-06 ENCOUNTER — OUTPATIENT (OUTPATIENT)
Dept: OUTPATIENT SERVICES | Facility: HOSPITAL | Age: 68
LOS: 1 days | End: 2023-02-06
Payer: COMMERCIAL

## 2023-02-06 ENCOUNTER — APPOINTMENT (OUTPATIENT)
Dept: MRI IMAGING | Facility: CLINIC | Age: 68
End: 2023-02-06
Payer: COMMERCIAL

## 2023-02-06 VITALS
OXYGEN SATURATION: 99 % | WEIGHT: 139.2 LBS | TEMPERATURE: 97.7 F | BODY MASS INDEX: 22.37 KG/M2 | RESPIRATION RATE: 16 BRPM | HEART RATE: 87 BPM | HEIGHT: 66 IN

## 2023-02-06 VITALS — SYSTOLIC BLOOD PRESSURE: 124 MMHG | DIASTOLIC BLOOD PRESSURE: 78 MMHG

## 2023-02-06 DIAGNOSIS — Z98.89 OTHER SPECIFIED POSTPROCEDURAL STATES: Chronic | ICD-10-CM

## 2023-02-06 DIAGNOSIS — Z00.8 ENCOUNTER FOR OTHER GENERAL EXAMINATION: ICD-10-CM

## 2023-02-06 PROCEDURE — 99213 OFFICE O/P EST LOW 20 MIN: CPT | Mod: 25

## 2023-02-06 PROCEDURE — 73221 MRI JOINT UPR EXTREM W/O DYE: CPT | Mod: 26,RT

## 2023-02-06 PROCEDURE — 36415 COLL VENOUS BLD VENIPUNCTURE: CPT

## 2023-02-06 PROCEDURE — 73221 MRI JOINT UPR EXTREM W/O DYE: CPT

## 2023-02-06 NOTE — PHYSICAL EXAM
[No Acute Distress] : no acute distress [Normal Sclera/Conjunctiva] : normal sclera/conjunctiva [Normal Outer Ear/Nose] : the outer ears and nose were normal in appearance [No JVD] : no jugular venous distention [No Respiratory Distress] : no respiratory distress  [No Accessory Muscle Use] : no accessory muscle use [Normal Rate] : normal rate  [Regular Rhythm] : with a regular rhythm [No Carotid Bruits] : no carotid bruits [No Edema] : there was no peripheral edema [No Extremity Clubbing/Cyanosis] : no extremity clubbing/cyanosis [Soft] : abdomen soft [Non Tender] : non-tender [Non-distended] : non-distended [No Masses] : no abdominal mass palpated [Normal Bowel Sounds] : normal bowel sounds [Grossly Normal Strength/Tone] : grossly normal strength/tone [No Focal Deficits] : no focal deficits

## 2023-02-06 NOTE — HISTORY OF PRESENT ILLNESS
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

## 2023-02-07 LAB
ALBUMIN SERPL ELPH-MCNC: 4.9 G/DL
ALP BLD-CCNC: 66 U/L
ALT SERPL-CCNC: 20 U/L
ANION GAP SERPL CALC-SCNC: 11 MMOL/L
AST SERPL-CCNC: 17 U/L
BASOPHILS # BLD AUTO: 0.04 K/UL
BASOPHILS NFR BLD AUTO: 0.9 %
BILIRUB SERPL-MCNC: 0.9 MG/DL
BUN SERPL-MCNC: 20 MG/DL
CALCIUM SERPL-MCNC: 10.1 MG/DL
CHLORIDE SERPL-SCNC: 102 MMOL/L
CHOLEST SERPL-MCNC: 150 MG/DL
CO2 SERPL-SCNC: 27 MMOL/L
CREAT SERPL-MCNC: 0.92 MG/DL
EGFR: 92 ML/MIN/1.73M2
EOSINOPHIL # BLD AUTO: 0.35 K/UL
EOSINOPHIL NFR BLD AUTO: 7.6 %
ESTIMATED AVERAGE GLUCOSE: 143 MG/DL
GLUCOSE SERPL-MCNC: 109 MG/DL
HBA1C MFR BLD HPLC: 6.6 %
HCT VFR BLD CALC: 46.3 %
HDLC SERPL-MCNC: 45 MG/DL
HGB BLD-MCNC: 14.1 G/DL
IMM GRANULOCYTES NFR BLD AUTO: 0.2 %
LDLC SERPL CALC-MCNC: 90 MG/DL
LYMPHOCYTES # BLD AUTO: 1.32 K/UL
LYMPHOCYTES NFR BLD AUTO: 28.8 %
MAN DIFF?: NORMAL
MCHC RBC-ENTMCNC: 29.1 PG
MCHC RBC-ENTMCNC: 30.5 GM/DL
MCV RBC AUTO: 95.7 FL
MONOCYTES # BLD AUTO: 0.35 K/UL
MONOCYTES NFR BLD AUTO: 7.6 %
NEUTROPHILS # BLD AUTO: 2.52 K/UL
NEUTROPHILS NFR BLD AUTO: 54.9 %
NONHDLC SERPL-MCNC: 105 MG/DL
PLATELET # BLD AUTO: 240 K/UL
POTASSIUM SERPL-SCNC: 5.2 MMOL/L
PROT SERPL-MCNC: 7.3 G/DL
RBC # BLD: 4.84 M/UL
RBC # FLD: 12.2 %
SODIUM SERPL-SCNC: 139 MMOL/L
TRIGL SERPL-MCNC: 75 MG/DL
TSH SERPL-ACNC: 0.99 UIU/ML
WBC # FLD AUTO: 4.59 K/UL

## 2023-04-21 ENCOUNTER — NON-APPOINTMENT (OUTPATIENT)
Age: 68
End: 2023-04-21

## 2023-04-24 ENCOUNTER — APPOINTMENT (OUTPATIENT)
Dept: CARDIOLOGY | Facility: CLINIC | Age: 68
End: 2023-04-24
Payer: COMMERCIAL

## 2023-04-24 PROCEDURE — 93015 CV STRESS TEST SUPVJ I&R: CPT

## 2023-04-24 PROCEDURE — A9500: CPT

## 2023-04-24 PROCEDURE — 78452 HT MUSCLE IMAGE SPECT MULT: CPT

## 2023-05-08 ENCOUNTER — APPOINTMENT (OUTPATIENT)
Dept: ENDOCRINOLOGY | Facility: CLINIC | Age: 68
End: 2023-05-08
Payer: COMMERCIAL

## 2023-05-08 VITALS
DIASTOLIC BLOOD PRESSURE: 70 MMHG | SYSTOLIC BLOOD PRESSURE: 119 MMHG | WEIGHT: 146 LBS | HEIGHT: 66 IN | BODY MASS INDEX: 23.46 KG/M2 | HEART RATE: 77 BPM | OXYGEN SATURATION: 98 %

## 2023-05-08 PROCEDURE — 99214 OFFICE O/P EST MOD 30 MIN: CPT

## 2023-05-10 ENCOUNTER — RX RENEWAL (OUTPATIENT)
Age: 68
End: 2023-05-10

## 2023-05-15 ENCOUNTER — APPOINTMENT (OUTPATIENT)
Dept: INTERNAL MEDICINE | Facility: CLINIC | Age: 68
End: 2023-05-15
Payer: COMMERCIAL

## 2023-05-15 VITALS
TEMPERATURE: 98 F | RESPIRATION RATE: 16 BRPM | WEIGHT: 142 LBS | HEIGHT: 66 IN | BODY MASS INDEX: 22.82 KG/M2 | OXYGEN SATURATION: 98 % | HEART RATE: 70 BPM

## 2023-05-15 VITALS — SYSTOLIC BLOOD PRESSURE: 118 MMHG | DIASTOLIC BLOOD PRESSURE: 78 MMHG

## 2023-05-15 PROCEDURE — 99214 OFFICE O/P EST MOD 30 MIN: CPT | Mod: 25

## 2023-05-15 PROCEDURE — 36415 COLL VENOUS BLD VENIPUNCTURE: CPT

## 2023-05-15 NOTE — HISTORY OF PRESENT ILLNESS
[de-identified] : Pt comes for FBW and med renewal  Pt going to PT for shoulder issues  No chest pain or SOB

## 2023-05-15 NOTE — PHYSICAL EXAM
[No Acute Distress] : no acute distress [Normal Sclera/Conjunctiva] : normal sclera/conjunctiva [Normal Outer Ear/Nose] : the outer ears and nose were normal in appearance [No JVD] : no jugular venous distention [No Lymphadenopathy] : no lymphadenopathy [No Respiratory Distress] : no respiratory distress  [No Accessory Muscle Use] : no accessory muscle use [Clear to Auscultation] : lungs were clear to auscultation bilaterally [Normal Rate] : normal rate  [Regular Rhythm] : with a regular rhythm [No Edema] : there was no peripheral edema [No Extremity Clubbing/Cyanosis] : no extremity clubbing/cyanosis [Soft] : abdomen soft [Non Tender] : non-tender [Non-distended] : non-distended [de-identified] : unable to raise right shoulder

## 2023-05-17 LAB
ALBUMIN SERPL ELPH-MCNC: 4.7 G/DL
ALP BLD-CCNC: 67 U/L
ALT SERPL-CCNC: 21 U/L
ANION GAP SERPL CALC-SCNC: 13 MMOL/L
AST SERPL-CCNC: 20 U/L
BASOPHILS # BLD AUTO: 0.05 K/UL
BASOPHILS NFR BLD AUTO: 0.9 %
BILIRUB SERPL-MCNC: 0.8 MG/DL
BUN SERPL-MCNC: 21 MG/DL
CALCIUM SERPL-MCNC: 10.1 MG/DL
CHLORIDE SERPL-SCNC: 104 MMOL/L
CHOLEST SERPL-MCNC: 152 MG/DL
CO2 SERPL-SCNC: 24 MMOL/L
CREAT SERPL-MCNC: 0.84 MG/DL
EGFR: 96 ML/MIN/1.73M2
EOSINOPHIL # BLD AUTO: 0.27 K/UL
EOSINOPHIL NFR BLD AUTO: 5.1 %
ESTIMATED AVERAGE GLUCOSE: 154 MG/DL
GLUCOSE SERPL-MCNC: 134 MG/DL
HBA1C MFR BLD HPLC: 7 %
HCT VFR BLD CALC: 46.7 %
HDLC SERPL-MCNC: 49 MG/DL
HGB BLD-MCNC: 14.1 G/DL
IMM GRANULOCYTES NFR BLD AUTO: 0.2 %
LDLC SERPL CALC-MCNC: 83 MG/DL
LYMPHOCYTES # BLD AUTO: 1.27 K/UL
LYMPHOCYTES NFR BLD AUTO: 24 %
MAN DIFF?: NORMAL
MCHC RBC-ENTMCNC: 29.7 PG
MCHC RBC-ENTMCNC: 30.2 GM/DL
MCV RBC AUTO: 98.5 FL
MONOCYTES # BLD AUTO: 0.36 K/UL
MONOCYTES NFR BLD AUTO: 6.8 %
NEUTROPHILS # BLD AUTO: 3.33 K/UL
NEUTROPHILS NFR BLD AUTO: 63 %
NONHDLC SERPL-MCNC: 103 MG/DL
PLATELET # BLD AUTO: 245 K/UL
POTASSIUM SERPL-SCNC: 4.4 MMOL/L
PROT SERPL-MCNC: 7.4 G/DL
RBC # BLD: 4.74 M/UL
RBC # FLD: 12.8 %
SODIUM SERPL-SCNC: 141 MMOL/L
TRIGL SERPL-MCNC: 104 MG/DL
TSH SERPL-ACNC: 1.08 UIU/ML
WBC # FLD AUTO: 5.29 K/UL

## 2023-05-23 ENCOUNTER — APPOINTMENT (OUTPATIENT)
Dept: CARDIOLOGY | Facility: CLINIC | Age: 68
End: 2023-05-23
Payer: COMMERCIAL

## 2023-05-23 ENCOUNTER — NON-APPOINTMENT (OUTPATIENT)
Age: 68
End: 2023-05-23

## 2023-05-23 VITALS
OXYGEN SATURATION: 100 % | HEIGHT: 66 IN | DIASTOLIC BLOOD PRESSURE: 81 MMHG | WEIGHT: 146 LBS | BODY MASS INDEX: 23.46 KG/M2 | SYSTOLIC BLOOD PRESSURE: 131 MMHG | HEART RATE: 69 BPM

## 2023-05-23 PROCEDURE — 93000 ELECTROCARDIOGRAM COMPLETE: CPT

## 2023-05-23 PROCEDURE — 99214 OFFICE O/P EST MOD 30 MIN: CPT

## 2023-05-23 NOTE — CARDIOLOGY SUMMARY
[de-identified] : SR [de-identified] : 4/19/21 nuc 10 METs, normal SPECT\par 4/2023  nuc 10 METs, normal SPECT  [de-identified] : 2017 normal LV function. \par 12/2022 normal LV function. \par  [de-identified] : 2018 mild disease

## 2023-05-23 NOTE — DISCUSSION/SUMMARY
[FreeTextEntry1] : 67 year man with a history as listed presents for a followup cardiac evaluation. \par Aren is doing well overall.. He denies any anginal symptoms. Clinically he is euvolemic on exam. His EKG did not reveal any significant ischemic changes. \par His blood pressure is controlled. He will continue  Olmesartan 5mg Qday. He will try to maintain a BP log at home. Reducing dietary salt intake advised.  He needs to hydrate more. \par He will continue with statin therapy to achieve maintain goal LDL<100 or ideally <70. \par He will followup with endocrine. \par Exercise and diet counseling was performed in order to reduce her future cardiovascular risk. \par He will followup with me in 6 months or sooner if necessary.  [EKG obtained to assist in diagnosis and management of assessed problem(s)] : EKG obtained to assist in diagnosis and management of assessed problem(s)

## 2023-05-23 NOTE — HISTORY OF PRESENT ILLNESS
[FreeTextEntry1] : 67 year old man with a history of CAD s/p PCI in 2012 to LAD, HFPEF,  DM, HLD, GERD, presetns for a followup visit. \par \par Since his last visit, he has been feeling well overall. He exercise with the treadmill at 4mph for about 20 minutes without any symptoms. He has started going to [email or web address]. He notes mild lightheadedness at the gym but does not stay well hydrated. He   denies any chest pain, dyspnea,  PND, orthopnea, lower extremity edema, near syncope, syncope, strokelike symptoms.  Medication reconciliation performed. He is compliant with his medications.

## 2023-06-21 ENCOUNTER — APPOINTMENT (OUTPATIENT)
Dept: SURGERY | Facility: CLINIC | Age: 68
End: 2023-06-21
Payer: COMMERCIAL

## 2023-06-21 VITALS
TEMPERATURE: 96.7 F | HEIGHT: 66 IN | OXYGEN SATURATION: 98 % | WEIGHT: 144 LBS | BODY MASS INDEX: 23.14 KG/M2 | SYSTOLIC BLOOD PRESSURE: 90 MMHG | DIASTOLIC BLOOD PRESSURE: 60 MMHG | HEART RATE: 74 BPM

## 2023-06-21 DIAGNOSIS — K40.90 UNILATERAL INGUINAL HERNIA, W/OUT OBSTRUCTION OR GANGRENE, NOT SPECIFIED AS RECURRENT: ICD-10-CM

## 2023-06-21 PROCEDURE — 99214 OFFICE O/P EST MOD 30 MIN: CPT

## 2023-06-21 RX ORDER — OXYBUTYNIN CHLORIDE 5 MG/1
5 TABLET, EXTENDED RELEASE ORAL DAILY
Qty: 90 | Refills: 0 | Status: DISCONTINUED | COMMUNITY
Start: 2022-10-27 | End: 2023-06-21

## 2023-06-21 NOTE — PLAN
[FreeTextEntry1] : Small and minimally symptomatic right inguinal hernia.  \par Left groin not impressive, though mild laxity? noted on serial exams.\par Given Mr. Ivory's relatively young age, consideration of operative intervention appropriate.\par The risks, benefits and nature of the operative intervention have been re-reviewed with Mr. IVORY.  \par In light of his lack of complaints stable exam, he wishes to continue ongoing observation with watchful waiting.  \par We have reviewed that the risk of surgical emergency is low.  \par However, he does understand the risk of progression of the hernia in terms of size or symptoms.  \par He understands that if ongoing observation with watchful waiting is his desire, than a RTO in 6 months is required.\par He is once again pleased and agrees.\par Should the hernia progress, I would advocate for elective repair at his convenience.\par Please note that more than 50% of face to face time was spent in counseling and coordination of care.

## 2023-06-21 NOTE — HISTORY OF PRESENT ILLNESS
[de-identified] : Very pleasant though admittedly anxious gentleman with ~ 1 month history of previously diagnosed right inguinal hernia.\par He denies angelika pain, but admits to a sensation of mild "awareness... pressure..."\par Mr. Ivory states that he was told of the presence of the hernia by his PMD with confirmation by his Urologist.\par He denies any obvious associated or ongoing systemic or GI or  complains.\par Mr. Ivory reports to me that he is undecided regarding repair and wishes for additional information or "options" regarding his care... [de-identified] : Very pleasant gentleman with  previously diagnosed right inguinal hernia returning for routine follow-up.\par Presenting to office today for routine ongoing follow-up visit without complaints.\par He continues to deny any angelika pain or sensations of awareness/ pressure...\par Mr. Ivory  denies any functional limitations.\par Mr. Ivory reports to me once again that he is leaning towards ongoing observation with watchful waiting...

## 2023-06-21 NOTE — PHYSICAL EXAM
[Respiratory Effort] : normal respiratory effort [Normal Rate and Rhythm] : normal rate and rhythm [No HSM] : no hepatosplenomegaly [Tender] : was nontender [Enlarged] : not enlarged [No Rash or Lesion] : No rash or lesion [Alert] : alert [Oriented to Person] : oriented to person [Oriented to Place] : oriented to place [Oriented to Time] : oriented to time [Calm] : calm [de-identified] : Appears well, no acute distress, ambulates easily into office.  [de-identified] : Remains normocephalic and atraumatic, as per baseline. [de-identified] : Still supple with full range of motion, as per usual. [FreeTextEntry1] : No palpable or appreciable cervical, supraclavicular, axillary or inguinal adenopathy.  [de-identified] : Deferred.  [de-identified] : Epigastrium WNL- all stable.\par Periumbilical fascia intact- no changes.\par Right groin with small inguinal hernia noted only during Valsalva type maneuvers- all stable.\par Left inguinal space intact at time of this exam +/- minimal laxity? without angelika or obvious defect- no changes.\par Bilateral femoral and Spigelian hernia spaces unremarkable upon additional focused Valsalva challenge. [de-identified] : Penis free of lesions.  Cords free of cysts.  Right testicle slightly smaller and more high riding than left.  Both free of masses.  All quite stable. [de-identified] : Deferred.  [de-identified] : Grossly symmetric and within normal limits without motor/ sensory deficits.

## 2023-07-13 RX ORDER — LANCING DEVICE/LANCETS
KIT MISCELLANEOUS
Qty: 1 | Refills: 0 | Status: ACTIVE | COMMUNITY
Start: 2023-07-13 | End: 1900-01-01

## 2023-08-02 ENCOUNTER — APPOINTMENT (OUTPATIENT)
Dept: DERMATOLOGY | Facility: CLINIC | Age: 68
End: 2023-08-02
Payer: COMMERCIAL

## 2023-08-02 VITALS — HEIGHT: 66 IN | WEIGHT: 140 LBS | BODY MASS INDEX: 22.5 KG/M2

## 2023-08-02 DIAGNOSIS — L85.3 XEROSIS CUTIS: ICD-10-CM

## 2023-08-02 DIAGNOSIS — L21.9 SEBORRHEIC DERMATITIS, UNSPECIFIED: ICD-10-CM

## 2023-08-02 DIAGNOSIS — L57.8 OTHER SKIN CHANGES DUE TO CHRONIC EXPOSURE TO NONIONIZING RADIATION: ICD-10-CM

## 2023-08-02 DIAGNOSIS — L81.8 OTHER SPECIFIED DISORDERS OF PIGMENTATION: ICD-10-CM

## 2023-08-02 PROCEDURE — 99204 OFFICE O/P NEW MOD 45 MIN: CPT

## 2023-08-02 RX ORDER — AMMONIUM LACTATE 12 %
12 CREAM (GRAM) TOPICAL
Qty: 454 | Refills: 6 | Status: ACTIVE | COMMUNITY
Start: 2023-08-02 | End: 1900-01-01

## 2023-08-02 RX ORDER — MOMETASONE FUROATE 1 MG/ML
0.1 SOLUTION TOPICAL
Qty: 1 | Refills: 2 | Status: ACTIVE | COMMUNITY
Start: 2023-08-02 | End: 1900-01-01

## 2023-08-02 RX ORDER — KETOCONAZOLE 20.5 MG/ML
2 SHAMPOO, SUSPENSION TOPICAL
Qty: 1 | Refills: 6 | Status: ACTIVE | COMMUNITY
Start: 2023-08-02 | End: 1900-01-01

## 2023-08-02 NOTE — ASSESSMENT
[FreeTextEntry1] : Seborrheic dermatitis, mild, scalp - chronic; exacerbation - Diagnosis and treatment options discussed We have discussed the nature and course of this condition. I have discussed the goals of therapy with the patient. We have discussed treatment options and expectations from treatment. - Start ketoconazole 2% shampoo EVERY OTHER DAY to affected areas on scalp. Leave on for 10 mins prior to rinsing off - Start mometasone solution BID for 1 week on, 1 week off as needed for itch on scalp. 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

## 2023-08-02 NOTE — PHYSICAL EXAM
[Alert] : alert [Oriented x 3] : ~L oriented x 3 [Well Nourished] : well nourished [Conjunctiva Non-injected] : conjunctiva non-injected [No Visual Lymphadenopathy] : no visual  lymphadenopathy [No Clubbing] : no clubbing [No Edema] : no edema [No Bromhidrosis] : no bromhidrosis [No Chromhidrosis] : no chromhidrosis [Declined] : declined [FreeTextEntry3] : Focused exam only (see below) per patient request:  white scaling on occipital scalp R posterior thigh with grouped open comedones hypopigmented macules scattered on shoulders, arms, legs, torso hyperpigmented patches with hypopigmented patches on lower lip

## 2023-08-02 NOTE — HISTORY OF PRESENT ILLNESS
[FreeTextEntry1] : spots [de-identified] : Mr. MAHAMED VICTOR is a 67 year old M here for evaluation of below  Problem: itchy spots Location: scalp  Duration: years Associated symptoms/Aggravating Factors: none Modifying factors/Treatments: OTC dandruff shampoo  #itchy spot R post thigh  #White spots on lip, arm, shoulders, legs  Personal hx of skin cancer: no FHx of skin cancer: no Social Hx: here with wife Ciera;  at

## 2023-08-14 NOTE — H&P PST ADULT - TOBACCO USE
Is This A New Presentation, Or A Follow-Up?: Skin Lesion What Type Of Note Output Would You Prefer (Optional)?: Bullet Format How Severe Is Your Skin Lesion?: moderate Has Your Skin Lesion Been Treated?: not been treated Never smoker

## 2023-08-25 RX ORDER — BLOOD-GLUCOSE METER
W/DEVICE EACH MISCELLANEOUS
Qty: 1 | Refills: 0 | Status: ACTIVE | COMMUNITY
Start: 2022-01-28 | End: 1900-01-01

## 2023-09-06 ENCOUNTER — RX RENEWAL (OUTPATIENT)
Age: 68
End: 2023-09-06

## 2023-09-17 ENCOUNTER — EMERGENCY (EMERGENCY)
Facility: HOSPITAL | Age: 68
LOS: 1 days | Discharge: HOME | End: 2023-09-17
Attending: STUDENT IN AN ORGANIZED HEALTH CARE EDUCATION/TRAINING PROGRAM | Admitting: STUDENT IN AN ORGANIZED HEALTH CARE EDUCATION/TRAINING PROGRAM
Payer: COMMERCIAL

## 2023-09-17 VITALS
TEMPERATURE: 98 F | RESPIRATION RATE: 19 BRPM | HEIGHT: 66 IN | HEART RATE: 94 BPM | DIASTOLIC BLOOD PRESSURE: 94 MMHG | WEIGHT: 139.99 LBS | SYSTOLIC BLOOD PRESSURE: 161 MMHG | OXYGEN SATURATION: 100 %

## 2023-09-17 DIAGNOSIS — Z98.89 OTHER SPECIFIED POSTPROCEDURAL STATES: Chronic | ICD-10-CM

## 2023-09-17 LAB
ALBUMIN SERPL ELPH-MCNC: 3.9 G/DL — SIGNIFICANT CHANGE UP (ref 3.3–5)
ALP SERPL-CCNC: 64 U/L — SIGNIFICANT CHANGE UP (ref 40–120)
ALT FLD-CCNC: 24 U/L — SIGNIFICANT CHANGE UP (ref 12–78)
ANION GAP SERPL CALC-SCNC: 6 MMOL/L — SIGNIFICANT CHANGE UP (ref 5–17)
APTT BLD: 31.2 SEC — SIGNIFICANT CHANGE UP (ref 24.5–35.6)
AST SERPL-CCNC: 14 U/L — LOW (ref 15–37)
BASOPHILS # BLD AUTO: 0.05 K/UL — SIGNIFICANT CHANGE UP (ref 0–0.2)
BASOPHILS NFR BLD AUTO: 1 % — SIGNIFICANT CHANGE UP (ref 0–2)
BILIRUB SERPL-MCNC: 0.8 MG/DL — SIGNIFICANT CHANGE UP (ref 0.2–1.2)
BUN SERPL-MCNC: 18 MG/DL — SIGNIFICANT CHANGE UP (ref 7–23)
CALCIUM SERPL-MCNC: 9.1 MG/DL — SIGNIFICANT CHANGE UP (ref 8.5–10.1)
CHLORIDE SERPL-SCNC: 106 MMOL/L — SIGNIFICANT CHANGE UP (ref 96–108)
CO2 SERPL-SCNC: 27 MMOL/L — SIGNIFICANT CHANGE UP (ref 22–31)
CREAT SERPL-MCNC: 1 MG/DL — SIGNIFICANT CHANGE UP (ref 0.5–1.3)
EGFR: 83 ML/MIN/1.73M2 — SIGNIFICANT CHANGE UP
EOSINOPHIL # BLD AUTO: 0.23 K/UL — SIGNIFICANT CHANGE UP (ref 0–0.5)
EOSINOPHIL NFR BLD AUTO: 4.5 % — SIGNIFICANT CHANGE UP (ref 0–6)
GLUCOSE SERPL-MCNC: 120 MG/DL — HIGH (ref 70–99)
HCT VFR BLD CALC: 41.5 % — SIGNIFICANT CHANGE UP (ref 39–50)
HGB BLD-MCNC: 13.3 G/DL — SIGNIFICANT CHANGE UP (ref 13–17)
IMM GRANULOCYTES NFR BLD AUTO: 0.4 % — SIGNIFICANT CHANGE UP (ref 0–0.9)
INR BLD: 0.89 RATIO — SIGNIFICANT CHANGE UP (ref 0.85–1.18)
LIDOCAIN IGE QN: 63 U/L — SIGNIFICANT CHANGE UP (ref 13–75)
LYMPHOCYTES # BLD AUTO: 1.02 K/UL — SIGNIFICANT CHANGE UP (ref 1–3.3)
LYMPHOCYTES # BLD AUTO: 20.1 % — SIGNIFICANT CHANGE UP (ref 13–44)
MAGNESIUM SERPL-MCNC: 2 MG/DL — SIGNIFICANT CHANGE UP (ref 1.6–2.6)
MCHC RBC-ENTMCNC: 29.9 PG — SIGNIFICANT CHANGE UP (ref 27–34)
MCHC RBC-ENTMCNC: 32 GM/DL — SIGNIFICANT CHANGE UP (ref 32–36)
MCV RBC AUTO: 93.3 FL — SIGNIFICANT CHANGE UP (ref 80–100)
MONOCYTES # BLD AUTO: 0.41 K/UL — SIGNIFICANT CHANGE UP (ref 0–0.9)
MONOCYTES NFR BLD AUTO: 8.1 % — SIGNIFICANT CHANGE UP (ref 2–14)
NEUTROPHILS # BLD AUTO: 3.35 K/UL — SIGNIFICANT CHANGE UP (ref 1.8–7.4)
NEUTROPHILS NFR BLD AUTO: 65.9 % — SIGNIFICANT CHANGE UP (ref 43–77)
NRBC # BLD: 0 /100 WBCS — SIGNIFICANT CHANGE UP (ref 0–0)
NT-PROBNP SERPL-SCNC: 30 PG/ML — SIGNIFICANT CHANGE UP (ref 0–125)
PLATELET # BLD AUTO: 224 K/UL — SIGNIFICANT CHANGE UP (ref 150–400)
POTASSIUM SERPL-MCNC: 4.1 MMOL/L — SIGNIFICANT CHANGE UP (ref 3.5–5.3)
POTASSIUM SERPL-SCNC: 4.1 MMOL/L — SIGNIFICANT CHANGE UP (ref 3.5–5.3)
PROT SERPL-MCNC: 7.2 G/DL — SIGNIFICANT CHANGE UP (ref 6–8.3)
PROTHROM AB SERPL-ACNC: 10.4 SEC — SIGNIFICANT CHANGE UP (ref 9.5–13)
RBC # BLD: 4.45 M/UL — SIGNIFICANT CHANGE UP (ref 4.2–5.8)
RBC # FLD: 11.8 % — SIGNIFICANT CHANGE UP (ref 10.3–14.5)
SODIUM SERPL-SCNC: 139 MMOL/L — SIGNIFICANT CHANGE UP (ref 135–145)
TROPONIN I, HIGH SENSITIVITY RESULT: 9.3 NG/L — SIGNIFICANT CHANGE UP
WBC # BLD: 5.08 K/UL — SIGNIFICANT CHANGE UP (ref 3.8–10.5)
WBC # FLD AUTO: 5.08 K/UL — SIGNIFICANT CHANGE UP (ref 3.8–10.5)

## 2023-09-17 PROCEDURE — 99285 EMERGENCY DEPT VISIT HI MDM: CPT

## 2023-09-17 PROCEDURE — 85025 COMPLETE CBC W/AUTO DIFF WBC: CPT

## 2023-09-17 PROCEDURE — 70450 CT HEAD/BRAIN W/O DYE: CPT | Mod: 26,59,QQ

## 2023-09-17 PROCEDURE — 93010 ELECTROCARDIOGRAM REPORT: CPT

## 2023-09-17 PROCEDURE — 70496 CT ANGIOGRAPHY HEAD: CPT | Mod: 26,QQ

## 2023-09-17 PROCEDURE — 80053 COMPREHEN METABOLIC PANEL: CPT

## 2023-09-17 PROCEDURE — 36415 COLL VENOUS BLD VENIPUNCTURE: CPT

## 2023-09-17 PROCEDURE — 83735 ASSAY OF MAGNESIUM: CPT

## 2023-09-17 PROCEDURE — 99285 EMERGENCY DEPT VISIT HI MDM: CPT | Mod: 25

## 2023-09-17 PROCEDURE — 70496 CT ANGIOGRAPHY HEAD: CPT | Mod: QQ

## 2023-09-17 PROCEDURE — 85610 PROTHROMBIN TIME: CPT

## 2023-09-17 PROCEDURE — 85730 THROMBOPLASTIN TIME PARTIAL: CPT

## 2023-09-17 PROCEDURE — 70498 CT ANGIOGRAPHY NECK: CPT | Mod: 26,QQ

## 2023-09-17 PROCEDURE — 71045 X-RAY EXAM CHEST 1 VIEW: CPT

## 2023-09-17 PROCEDURE — 84484 ASSAY OF TROPONIN QUANT: CPT

## 2023-09-17 PROCEDURE — 83690 ASSAY OF LIPASE: CPT

## 2023-09-17 PROCEDURE — 93005 ELECTROCARDIOGRAM TRACING: CPT

## 2023-09-17 PROCEDURE — 83880 ASSAY OF NATRIURETIC PEPTIDE: CPT

## 2023-09-17 PROCEDURE — 71045 X-RAY EXAM CHEST 1 VIEW: CPT | Mod: 26

## 2023-09-17 PROCEDURE — 70498 CT ANGIOGRAPHY NECK: CPT | Mod: QQ

## 2023-09-17 PROCEDURE — 96374 THER/PROPH/DIAG INJ IV PUSH: CPT | Mod: XU

## 2023-09-17 PROCEDURE — 70450 CT HEAD/BRAIN W/O DYE: CPT | Mod: QQ

## 2023-09-17 RX ORDER — SODIUM CHLORIDE 9 MG/ML
1000 INJECTION INTRAMUSCULAR; INTRAVENOUS; SUBCUTANEOUS ONCE
Refills: 0 | Status: COMPLETED | OUTPATIENT
Start: 2023-09-17 | End: 2023-09-17

## 2023-09-17 RX ORDER — MECLIZINE HCL 12.5 MG
1 TABLET ORAL
Qty: 16 | Refills: 0
Start: 2023-09-17 | End: 2023-09-20

## 2023-09-17 RX ORDER — MECLIZINE HCL 12.5 MG
25 TABLET ORAL ONCE
Refills: 0 | Status: COMPLETED | OUTPATIENT
Start: 2023-09-17 | End: 2023-09-17

## 2023-09-17 RX ORDER — METOCLOPRAMIDE HCL 10 MG
10 TABLET ORAL ONCE
Refills: 0 | Status: COMPLETED | OUTPATIENT
Start: 2023-09-17 | End: 2023-09-17

## 2023-09-17 RX ADMIN — Medication 25 MILLIGRAM(S): at 17:14

## 2023-09-17 RX ADMIN — SODIUM CHLORIDE 1000 MILLILITER(S): 9 INJECTION INTRAMUSCULAR; INTRAVENOUS; SUBCUTANEOUS at 17:19

## 2023-09-17 RX ADMIN — Medication 10 MILLIGRAM(S): at 17:14

## 2023-09-17 NOTE — ED PROVIDER NOTE - PHYSICAL EXAMINATION
GEN - NAD, well appearing, A&Ox3  HEAD - NC/AT  EYES - PERRL, EOMI. +Leftward gaze horizontal nystagmus.  ENT - Airway patent, mucous membranes dry. B/l EACs and TMs wnl.  NECK - Supple, non-tender without lymphadenopathy, no masses  PULMONARY - CTA b/l, symmetric breath sounds, no W/R/R  CARDIAC - +S1S2, RRR, no M/G/R, no JVD  ABDOMEN - +BS, ND, NT, soft, no guarding, no rebound, no masses, no rigidity   - No CVA TTP b/l  EXTREMITIES - FROM, symmetric pulses, no edema, 5/5 strength in b/l UE and LE  SKIN - No rash or bruising  NEUROLOGIC - Alert, speech clear, CN II-XII grossly intact, no pronator drift, unable to assess gait at this time, strength and sensation grossly intact, FTN negative b/l  PSYCH - Normal mood/affect, normal insight

## 2023-09-17 NOTE — ED PROVIDER NOTE - OBJECTIVE STATEMENT
The patient is a 67y Male with pmhx of HLD, DM2, vertigo, and CAD with a JERSON in the LAD (2012), on ASA daily, p/w dizziness/vertigo symptoms. Pt says that he was driving to work when he suddenly felt like the room was spinning. Said that it felt like prior vertigo episodes. Said symptoms were worsened by head movement. Said symptoms started around 3:15pm today, have progressively worsened. Endorsing diaphoresis and light-headedness as if he may pass out, but denies LOC. Had difficulty ambulating 2/2 to dizziness. Says he pulled over and called 911. Denies HA, vision changes, cp, sob, abd pain, n/v/d, urinary symptoms, leg swelling, extremity weakness, sensory changes, head trauma. No prior hx of stroke. Had stress test a few months ago, which he says was wnl. NKDA.

## 2023-09-17 NOTE — ED PROVIDER NOTE - CHIEF COMPLAINT
The patient is a 67y Male complaining of dizziness. Spironolactone Pregnancy And Lactation Text: This medication can cause feminization of the male fetus and should be avoided during pregnancy. The active metabolite is also found in breast milk.

## 2023-09-17 NOTE — ED PROVIDER NOTE - PROVIDER TOKENS
PROVIDER:[TOKEN:[[id number]],FOLLOWUP:[Routine]],PROVIDER:[TOKEN:[[id number]],FOLLOWUP:[Routine]]

## 2023-09-17 NOTE — ED PROVIDER NOTE - CARE PROVIDER_API CALL
Edwin Francois  Otolaryngology  [address] [place] [address]  Phone: [phone]  Fax: [phone]  Follow Up Time: Routine    Zach Qiu  Neurology  [address] [place] [address]  Phone: [phone]  Fax: [phone]  Follow Up Time: Routine

## 2023-09-17 NOTE — ED PROVIDER NOTE - NSFOLLOWUPINSTRUCTIONS_ED_ALL_ED_FT
Please  the medication Meclizine and take it as needed for worsening vertigo symptoms.    Please follow-up with an ENT (ear, nose, throat) specialist (otolaryngologist) and neurologist for your vertigo symptoms. Their contact information is attached. Please call to schedule an appointment.    Dizziness    Dizziness can manifest as a feeling of unsteadiness or light-headedness. You may feel like you are about to faint. This condition can be caused by a number of things, including medicines, dehydration, or illness. Drink enough fluid to keep your urine clear or pale yellow. Do not drink alcohol and limit your caffeine intake. Avoid quick or sudden movements.  Rise slowly from chairs and steady yourself until you feel okay. In the morning, first sit up on the side of the bed.    SEEK IMMEDIATE MEDICAL CARE IF YOU HAVE ANY OF THE FOLLOWING SYMPTOMS: vomiting, changes in your vision or speech, weakness in your arms or legs, trouble speaking or swallowing, chest pain, abdominal pain, shortness of breath, sweating, bleeding, headache, neck pain, or fever.    Vertigo    WHAT YOU NEED TO KNOW:    What is vertigo? Vertigo is a condition that causes you to feel dizzy. You may feel that you or everything around you is moving or spinning. You may also feel like you are being pulled down or toward your side.    What causes vertigo? The inner ear is filled with fluid, a nerve, and small organs. These structures help you maintain your balance. Vertigo may be caused by diseases or conditions that affect your inner ear or the part of your brain that controls balance. Any of the following can cause vertigo:    Small particles that float in the inner ear fluid move out of place and cause irritation    Ménière disease    Ear trauma    An inner ear infection    A neurologic condition such as multiple sclerosis, migraine, tumor, or stroke    Panic and anxiety disorders    Drinking a large amount of alcohol  What signs and symptoms may happen with vertigo?    Nausea or vomiting    Trouble with your balance    Sensitivity to light or sound    Weakness, slurred speech, problems seeing or moving, or increased sleepiness    Facial weakness and headache    Hearing loss, ear fullness or pain, or hearing ringing sounds    Fast, uncontrolled movement of your eyes  How is vertigo diagnosed? Your healthcare provider will ask about your symptoms. Tell your healthcare provider about past diseases, travels, activities, trauma, and medicines. Your healthcare provider may move your head in different directions. This will check to see if a problem in the inner ear is causing your vertigo. You may be asked to do some exercises that could make you dizzy. You may also need one or more of the following to find the cause of vertigo:    An electronystagmography (ENG) is done to test for problems you may have with balance or dizziness. Sticky pads with wires are placed on the skin around your eyes. The wires are connected to a machine that records information during your ENG. Warm and cool air or water is put into your ears while your eye movements are recorded. Do not drink alcohol or eat a heavy meal before this test. You may feel dizzy or nauseated after the test.    An auditory brainstem response (ABR) test is used to play a series of clicks through headsets on your ears. A machine measures how your cochlea and nerves react to the clicks.    An MRI of the brain may be used to check for problems that can cause vertigo. Do not enter the MRI room with anything metal. Metal can cause serious damage. Tell the healthcare provider if you have any metal in or on your body.  How is vertigo treated? Treatment will depend on the condition causing the vertigo. Your healthcare provider may suggest that you rest in bed or avoid certain activities for a time. You may need to decrease or stop taking medicines that are causing your vertigo. Medicines may also be prescribed to help relieve your symptoms.    How can I manage my symptoms?    Do not drive, walk without help, or operate heavy machinery when you are dizzy.    Move slowly when you move from one position to another position. Get up slowly from sitting or lying down. Sit or lie down right away if you feel dizzy.    Drink plenty of liquids. Liquids help prevent dehydration. Ask how much liquid to drink each day and which liquids are best for you.    Vestibular and balance rehabilitation therapy (VBRT) is used to teach you exercises to improve your balance and strength. These exercises may help decrease your vertigo and improve your balance. Ask for more information about this therapy.  When should I seek immediate care?    You have a headache and a stiff neck.    You have shaking chills and a fever.    You vomit over and over with no relief.    You have blood, pus, or fluid coming out of your ears.    You are confused.  When should I contact my healthcare provider?    Your symptoms do not get better with treatment.    You have questions about your condition or care.  CARE AGREEMENT:    You have the right to help plan your care. Learn about your health condition and how it may be treated. Discuss treatment options with your healthcare providers to decide what care you want to receive. You always have the right to refuse treatment.

## 2023-09-17 NOTE — ED ADULT NURSE NOTE - NS ED NURSE LEVEL OF CONSCIOUSNESS ORIENTATION
I have reviewed the chart of Israel Sutton and I have interviewed and examined the patient. I agree with the PA's note, assessment and treatment plan.    Brief history: SOB with exertion. No CP    Brief physical: Agree with PA    Vitals  Vitals:    06/14/20 1204 06/14/20 1206 06/14/20 1230 06/14/20 1300   BP:  124/68 124/63 (!) 129/96   Pulse: 90 91 91 91   Resp: 15 18 (!) 22 16   Temp:       TempSrc:       SpO2: 98% 98% 97% 98%       ED Course      MDM  Israel has a history of bladder cancer and is undergoing chemotherapy.  He has had increasing shortness of breath over the past couple days especially with exertion.  He thought this was due to his cancer and chemotherapy.  His EKG does not show ST elevation however his troponin is elevated.  Likely has NSTEMI.  Cardiology will be consulted and the patient will be admitted for further care.    Critical Care time spent on this patient outside of billable procedures:  None        I have reviewed the information recorded by the scribe for accuracy and agree with its contents.    ________________________________________________________________      Dr. Jeffrey Renee  Dictation # [id number]       Jeffrey Renee MD  06/18/20 0701     Oriented - self; Oriented - place; Oriented - time

## 2023-09-17 NOTE — ED PROVIDER NOTE - PATIENT PORTAL LINK FT
You can access the FollowMyHealth Patient Portal offered by [facility] by registering at the following website: [email or web address]. By joining [facility]’s FollowMyHealth portal, you will also be able to view your health information using other applications (apps) compatible with our system.

## 2023-09-17 NOTE — ED PROVIDER NOTE - NSICDXPASTMEDICALHX_GEN_ALL_CORE_FT
PAST MEDICAL HISTORY:  CAD (coronary artery disease)     Diabetes mellitus type II     GERD (gastroesophageal reflux disease)     H/O hyperlipidemia     H/O syncope March 7    History of seasonal allergies     Sprain of left rotator cuff capsule, initial encounter

## 2023-09-17 NOTE — ED PROVIDER NOTE - PROGRESS NOTE DETAILS
Aguilar Martinez MD (Attending Physician): Pt was re-evaluated at bedside, VSS, feeling better overall. Vertigo symptoms have subsided and pt is able to ambulate in ED without difficulty. Other labs and imaging non-actionable. Results were discussed with patient as well as return precautions and follow up plan with ENT and neurology. Time was taken to answer any questions that the patient had before providing them with discharge paperwork.

## 2023-09-17 NOTE — ED PROVIDER NOTE - CLINICAL SUMMARY MEDICAL DECISION MAKING FREE TEXT BOX
Aguilar Martinez MD (Attending Physician): Aguilar Martinez MD (Attending Physician): The patient is a 67y Male with pmhx of HLD, DM2, vertigo, and CAD with a JERSON in the LAD (2012), on ASA daily, p/w dizziness/vertigo symptoms. Most likely peripheral vertigo. DDx includes, but not limited to: central vertigo, ACS, CHF, PE, dehydration, anemia, electrolyte imbalance, orthostatic hypotension, vasovagal. ekg, cxr, CTA head/neck, labs, meclizine, reglan, IVF. Dispo pending w/u.

## 2023-09-17 NOTE — ED ADULT NURSE NOTE - NSFALLUNIVINTERV_ED_ALL_ED
Bed/Stretcher in lowest position, wheels locked, appropriate side rails in place/Call bell, personal items and telephone in reach/Instruct patient to call for assistance before getting out of bed/chair/stretcher/Non-slip footwear applied when patient is off stretcher/Cyrus to call system/Physically safe environment - no spills, clutter or unnecessary equipment/Purposeful proactive rounding/Room/bathroom lighting operational, light cord in reach

## 2023-09-20 ENCOUNTER — APPOINTMENT (OUTPATIENT)
Dept: OTOLARYNGOLOGY | Facility: CLINIC | Age: 68
End: 2023-09-20
Payer: COMMERCIAL

## 2023-09-20 VITALS
DIASTOLIC BLOOD PRESSURE: 76 MMHG | SYSTOLIC BLOOD PRESSURE: 123 MMHG | RESPIRATION RATE: 17 BRPM | OXYGEN SATURATION: 99 % | WEIGHT: 140 LBS | BODY MASS INDEX: 22.5 KG/M2 | HEIGHT: 66 IN | HEART RATE: 66 BPM

## 2023-09-20 DIAGNOSIS — H90.3 SENSORINEURAL HEARING LOSS, BILATERAL: ICD-10-CM

## 2023-09-20 DIAGNOSIS — H61.23 IMPACTED CERUMEN, BILATERAL: ICD-10-CM

## 2023-09-20 DIAGNOSIS — R26.89 OTHER ABNORMALITIES OF GAIT AND MOBILITY: ICD-10-CM

## 2023-09-20 DIAGNOSIS — R42 DIZZINESS AND GIDDINESS: ICD-10-CM

## 2023-09-20 PROCEDURE — 92557 COMPREHENSIVE HEARING TEST: CPT

## 2023-09-20 PROCEDURE — G0268 REMOVAL OF IMPACTED WAX MD: CPT

## 2023-09-20 PROCEDURE — 99214 OFFICE O/P EST MOD 30 MIN: CPT | Mod: 25

## 2023-09-20 PROCEDURE — 92567 TYMPANOMETRY: CPT

## 2023-09-20 RX ORDER — ASPIRIN ENTERIC COATED TABLETS 81 MG 81 MG/1
81 TABLET, DELAYED RELEASE ORAL
Refills: 3 | Status: ACTIVE | COMMUNITY

## 2023-09-25 ENCOUNTER — NON-APPOINTMENT (OUTPATIENT)
Age: 68
End: 2023-09-25

## 2023-09-25 ENCOUNTER — APPOINTMENT (OUTPATIENT)
Dept: INTERNAL MEDICINE | Facility: CLINIC | Age: 68
End: 2023-09-25
Payer: COMMERCIAL

## 2023-09-25 VITALS — HEIGHT: 66 IN | OXYGEN SATURATION: 98 % | WEIGHT: 140 LBS | BODY MASS INDEX: 22.5 KG/M2 | HEART RATE: 60 BPM

## 2023-09-25 VITALS — DIASTOLIC BLOOD PRESSURE: 72 MMHG | SYSTOLIC BLOOD PRESSURE: 128 MMHG

## 2023-09-25 DIAGNOSIS — R42 DIZZINESS AND GIDDINESS: ICD-10-CM

## 2023-09-25 PROCEDURE — 93000 ELECTROCARDIOGRAM COMPLETE: CPT

## 2023-09-25 PROCEDURE — G0008: CPT

## 2023-09-25 PROCEDURE — 36415 COLL VENOUS BLD VENIPUNCTURE: CPT

## 2023-09-25 PROCEDURE — 99397 PER PM REEVAL EST PAT 65+ YR: CPT | Mod: 25

## 2023-09-25 PROCEDURE — 90662 IIV NO PRSV INCREASED AG IM: CPT

## 2023-09-25 RX ORDER — MECLIZINE HYDROCHLORIDE 12.5 MG/1
12.5 TABLET ORAL 3 TIMES DAILY
Qty: 30 | Refills: 0 | Status: ACTIVE | COMMUNITY
Start: 2023-09-25 | End: 1900-01-01

## 2023-09-26 PROBLEM — H90.3 SENSORINEURAL HEARING LOSS (SNHL) OF BOTH EARS: Status: ACTIVE | Noted: 2023-09-26

## 2023-09-26 PROBLEM — H61.23 BILATERAL IMPACTED CERUMEN: Status: ACTIVE | Noted: 2022-04-29

## 2023-09-26 LAB
ALBUMIN SERPL ELPH-MCNC: 4.7 G/DL
ALP BLD-CCNC: 70 U/L
ALT SERPL-CCNC: 16 U/L
ANION GAP SERPL CALC-SCNC: 11 MMOL/L
AST SERPL-CCNC: 18 U/L
BASOPHILS # BLD AUTO: 0.05 K/UL
BASOPHILS NFR BLD AUTO: 0.9 %
BILIRUB SERPL-MCNC: 1 MG/DL
BUN SERPL-MCNC: 21 MG/DL
CALCIUM SERPL-MCNC: 9.7 MG/DL
CHLORIDE SERPL-SCNC: 102 MMOL/L
CHOLEST SERPL-MCNC: 142 MG/DL
CO2 SERPL-SCNC: 26 MMOL/L
CREAT SERPL-MCNC: 1.14 MG/DL
EGFR: 71 ML/MIN/1.73M2
EOSINOPHIL # BLD AUTO: 0.33 K/UL
EOSINOPHIL NFR BLD AUTO: 5.9 %
ESTIMATED AVERAGE GLUCOSE: 157 MG/DL
GLUCOSE SERPL-MCNC: 114 MG/DL
HBA1C MFR BLD HPLC: 7.1 %
HCT VFR BLD CALC: 42.6 %
HDLC SERPL-MCNC: 44 MG/DL
HGB BLD-MCNC: 13.5 G/DL
IMM GRANULOCYTES NFR BLD AUTO: 0.4 %
LDLC SERPL CALC-MCNC: 81 MG/DL
LYMPHOCYTES # BLD AUTO: 1.6 K/UL
LYMPHOCYTES NFR BLD AUTO: 28.6 %
MAN DIFF?: NORMAL
MCHC RBC-ENTMCNC: 30.5 PG
MCHC RBC-ENTMCNC: 31.7 GM/DL
MCV RBC AUTO: 96.2 FL
MONOCYTES # BLD AUTO: 0.49 K/UL
MONOCYTES NFR BLD AUTO: 8.8 %
NEUTROPHILS # BLD AUTO: 3.11 K/UL
NEUTROPHILS NFR BLD AUTO: 55.4 %
NONHDLC SERPL-MCNC: 99 MG/DL
PLATELET # BLD AUTO: 226 K/UL
POTASSIUM SERPL-SCNC: 4.9 MMOL/L
PROT SERPL-MCNC: 7.1 G/DL
PSA SERPL-MCNC: 0.71 NG/ML
RBC # BLD: 4.43 M/UL
RBC # FLD: 11.9 %
SODIUM SERPL-SCNC: 139 MMOL/L
TRIGL SERPL-MCNC: 92 MG/DL
TSH SERPL-ACNC: 1.69 UIU/ML
WBC # FLD AUTO: 5.6 K/UL

## 2023-10-17 ENCOUNTER — NON-APPOINTMENT (OUTPATIENT)
Age: 68
End: 2023-10-17

## 2023-10-19 ENCOUNTER — NON-APPOINTMENT (OUTPATIENT)
Age: 68
End: 2023-10-19

## 2023-10-19 ENCOUNTER — APPOINTMENT (OUTPATIENT)
Dept: ORTHOPEDIC SURGERY | Facility: CLINIC | Age: 68
End: 2023-10-19
Payer: COMMERCIAL

## 2023-10-19 VITALS
HEART RATE: 91 BPM | DIASTOLIC BLOOD PRESSURE: 63 MMHG | SYSTOLIC BLOOD PRESSURE: 97 MMHG | HEIGHT: 66 IN | WEIGHT: 140 LBS | BODY MASS INDEX: 22.5 KG/M2

## 2023-10-19 PROCEDURE — 73030 X-RAY EXAM OF SHOULDER: CPT | Mod: RT

## 2023-10-19 PROCEDURE — 99214 OFFICE O/P EST MOD 30 MIN: CPT

## 2023-11-13 ENCOUNTER — APPOINTMENT (OUTPATIENT)
Dept: ENDOCRINOLOGY | Facility: CLINIC | Age: 68
End: 2023-11-13

## 2023-11-13 NOTE — ASU DISCHARGE PLAN (ADULT/PEDIATRIC) - NS DC INTERPRETER YES NO
Auth good through end of day per JESSICA. Facility will need to re-submit once medically stable after today.   No

## 2023-11-20 ENCOUNTER — APPOINTMENT (OUTPATIENT)
Dept: CARDIOLOGY | Facility: CLINIC | Age: 68
End: 2023-11-20
Payer: COMMERCIAL

## 2023-11-20 VITALS
HEIGHT: 66 IN | HEART RATE: 70 BPM | DIASTOLIC BLOOD PRESSURE: 76 MMHG | OXYGEN SATURATION: 99 % | BODY MASS INDEX: 23.46 KG/M2 | SYSTOLIC BLOOD PRESSURE: 131 MMHG | WEIGHT: 146 LBS

## 2023-11-20 DIAGNOSIS — R55 SYNCOPE AND COLLAPSE: ICD-10-CM

## 2023-11-20 PROCEDURE — 93000 ELECTROCARDIOGRAM COMPLETE: CPT

## 2023-11-20 PROCEDURE — 99215 OFFICE O/P EST HI 40 MIN: CPT

## 2023-11-20 RX ORDER — OLMESARTAN MEDOXOMIL 5 MG/1
5 TABLET, FILM COATED ORAL
Qty: 90 | Refills: 3 | Status: DISCONTINUED | COMMUNITY
Start: 2022-11-18 | End: 2023-11-20

## 2023-11-22 ENCOUNTER — APPOINTMENT (OUTPATIENT)
Dept: INTERNAL MEDICINE | Facility: CLINIC | Age: 68
End: 2023-11-22

## 2023-12-04 ENCOUNTER — APPOINTMENT (OUTPATIENT)
Dept: CARDIOLOGY | Facility: CLINIC | Age: 68
End: 2023-12-04
Payer: COMMERCIAL

## 2023-12-04 PROCEDURE — 93880 EXTRACRANIAL BILAT STUDY: CPT

## 2023-12-04 PROCEDURE — 93306 TTE W/DOPPLER COMPLETE: CPT

## 2023-12-13 ENCOUNTER — APPOINTMENT (OUTPATIENT)
Dept: SURGERY | Facility: CLINIC | Age: 68
End: 2023-12-13

## 2023-12-19 ENCOUNTER — NON-APPOINTMENT (OUTPATIENT)
Age: 68
End: 2023-12-19

## 2023-12-20 ENCOUNTER — APPOINTMENT (OUTPATIENT)
Dept: ENDOCRINOLOGY | Facility: CLINIC | Age: 68
End: 2023-12-20

## 2024-01-15 ENCOUNTER — APPOINTMENT (OUTPATIENT)
Dept: ENDOCRINOLOGY | Facility: CLINIC | Age: 69
End: 2024-01-15
Payer: COMMERCIAL

## 2024-01-15 VITALS
SYSTOLIC BLOOD PRESSURE: 128 MMHG | WEIGHT: 144 LBS | HEART RATE: 72 BPM | HEIGHT: 66 IN | DIASTOLIC BLOOD PRESSURE: 81 MMHG | BODY MASS INDEX: 23.14 KG/M2 | OXYGEN SATURATION: 99 %

## 2024-01-15 LAB
ALBUMIN SERPL ELPH-MCNC: 4.5 G/DL
ALP BLD-CCNC: 67 U/L
ALT SERPL-CCNC: 24 U/L
ANION GAP SERPL CALC-SCNC: 11 MMOL/L
AST SERPL-CCNC: 18 U/L
BASOPHILS # BLD AUTO: 0.02 K/UL
BASOPHILS NFR BLD AUTO: 0.4 %
BILIRUB SERPL-MCNC: 0.7 MG/DL
BUN SERPL-MCNC: 23 MG/DL
CALCIUM SERPL-MCNC: 9.3 MG/DL
CHLORIDE SERPL-SCNC: 104 MMOL/L
CHOLEST SERPL-MCNC: 149 MG/DL
CO2 SERPL-SCNC: 26 MMOL/L
CREAT SERPL-MCNC: 0.92 MG/DL
EGFR: 91 ML/MIN/1.73M2
EOSINOPHIL # BLD AUTO: 0.14 K/UL
EOSINOPHIL NFR BLD AUTO: 2.8 %
ESTIMATED AVERAGE GLUCOSE: 154 MG/DL
GLUCOSE SERPL-MCNC: 106 MG/DL
HBA1C MFR BLD HPLC: 7 %
HCT VFR BLD CALC: 41.5 %
HDLC SERPL-MCNC: 41 MG/DL
HGB BLD-MCNC: 13.1 G/DL
IMM GRANULOCYTES NFR BLD AUTO: 0.4 %
LDLC SERPL CALC-MCNC: 88 MG/DL
LYMPHOCYTES # BLD AUTO: 1.42 K/UL
LYMPHOCYTES NFR BLD AUTO: 28.1 %
MAN DIFF?: NORMAL
MCHC RBC-ENTMCNC: 28.9 PG
MCHC RBC-ENTMCNC: 31.6 GM/DL
MCV RBC AUTO: 91.4 FL
MONOCYTES # BLD AUTO: 0.4 K/UL
MONOCYTES NFR BLD AUTO: 7.9 %
NEUTROPHILS # BLD AUTO: 3.05 K/UL
NEUTROPHILS NFR BLD AUTO: 60.4 %
NONHDLC SERPL-MCNC: 107 MG/DL
PLATELET # BLD AUTO: 247 K/UL
POTASSIUM SERPL-SCNC: 4.4 MMOL/L
PROT SERPL-MCNC: 6.9 G/DL
RBC # BLD: 4.54 M/UL
RBC # FLD: 12.7 %
SODIUM SERPL-SCNC: 142 MMOL/L
TRIGL SERPL-MCNC: 107 MG/DL
TSH SERPL-ACNC: 0.94 UIU/ML
WBC # FLD AUTO: 5.05 K/UL

## 2024-01-15 PROCEDURE — 99214 OFFICE O/P EST MOD 30 MIN: CPT | Mod: 25

## 2024-01-15 RX ORDER — BLOOD SUGAR DIAGNOSTIC
STRIP MISCELLANEOUS TWICE DAILY
Qty: 200 | Refills: 6 | Status: ACTIVE | COMMUNITY
Start: 2022-01-28 | End: 1900-01-01

## 2024-01-15 RX ORDER — METFORMIN HYDROCHLORIDE 500 MG/1
500 TABLET, COATED ORAL
Qty: 360 | Refills: 3 | Status: ACTIVE | COMMUNITY
Start: 2020-11-03 | End: 1900-01-01

## 2024-01-15 RX ORDER — LANCETS
EACH MISCELLANEOUS
Qty: 2 | Refills: 6 | Status: ACTIVE | COMMUNITY
Start: 2022-06-21 | End: 1900-01-01

## 2024-01-15 RX ORDER — ISOPROPYL ALCOHOL 0.7 ML/ML
SWAB TOPICAL
Qty: 2 | Refills: 6 | Status: ACTIVE | COMMUNITY
Start: 2022-06-21 | End: 1900-01-01

## 2024-01-15 NOTE — PHYSICAL EXAM
[Alert] : alert [Well Nourished] : well nourished [No Acute Distress] : no acute distress [Well Developed] : well developed [Normal Sclera/Conjunctiva] : normal sclera/conjunctiva [EOMI] : extra ocular movement intact [No Proptosis] : no proptosis [Normal Oropharynx] : the oropharynx was normal [Thyroid Not Enlarged] : the thyroid was not enlarged [No Respiratory Distress] : no respiratory distress [No Accessory Muscle Use] : no accessory muscle use [Normal Rate] : heart rate was normal [No Edema] : no peripheral edema [Not Distended] : not distended [Normal Anterior Cervical Nodes] : no anterior cervical lymphadenopathy [No Spinal Tenderness] : no spinal tenderness [No Stigmata of Cushings Syndrome] : no stigmata of Cushings Syndrome [Normal Gait] : normal gait [Normal Strength/Tone] : muscle strength and tone were normal [No Rash] : no rash [Normal Reflexes] : deep tendon reflexes were 2+ and symmetric [No Tremors] : no tremors [Oriented x3] : oriented to person, place, and time [Acanthosis Nigricans] : no acanthosis nigricans [de-identified] : +firm gland

## 2024-01-15 NOTE — ASSESSMENT
[Diabetes Foot Care] : diabetes foot care [Long Term Vascular Complications] : long term vascular complications of diabetes [Carbohydrate Consistent Diet] : carbohydrate consistent diet [Importance of Diet and Exercise] : importance of diet and exercise to improve glycemic control, achieve weight loss and improve cardiovascular health [Exercise/Effect on Glucose] : exercise/effect on glucose [Hypoglycemia Management] : hypoglycemia management [Self Monitoring of Blood Glucose] : self monitoring of blood glucose [Retinopathy Screening] : Patient was referred to ophthalmology for retinopathy screening [Diabetic Medications] : Risks and benefits of diabetic medications were discussed [FreeTextEntry1] : 68 year old male with PMH of GERD, CAD s/p JERSON to LAD 2012, HLD, HTN and diagnosed with DM for 15 years.  1. Type 2 diabetes mellitus. HbA1c 7.0% May/Sept 2023 --> 7.0% Jan 2024  -We discussed the cardiovascular and microvascular complications of uncontrolled diabetes. We discussed the importance of diet and exercise and lifestyle modification for glycemic control and weight loss. We discussed referral to our diabetes educator and nutrition. We discussed pharmacologic options for glycemic control. -We discussed GLP-1s given his CVD history but patient's BMI is 22 with minimal appetite. -Continue metformin 1000mg BID with food. Pt has no GI side effects. -Repeat A1c in April 2024, If A1c >7% Start Januvia 100mg QD. If A1c april 2024 <7%, hold januvia 100mg.  -Urine UTD -Continue lipitor for HLD. LDL 81 -Continue to check SMBG 1x daily at varying times. Goal A1c is <6.5% -Opthal UTD -Avoid weight lose medicationsdue to BMI 23.   Patient verbalized understanding of the above. All questions were answered to patient's satisfaction. Dispo: Patient will follow up in 6 months.

## 2024-01-15 NOTE — HISTORY OF PRESENT ILLNESS
[FreeTextEntry1] : Mr. Ivory is presenting for follow up care for his Diabetes.   68 year old male with PMH of GERD, CAD s/p JERSON to LAD , HLD, HTN and diagnosed with DM for 15 years.   Reports no microvascular complications, no history of retinopathy, nephropathy or neuropathy. Reports no history of CHF or CVA in the past.   Current DM meds: metformin 1000mg BID (no GI side effects)  Prior DM meds:  Other pertinent meds: aspirin 81mg, lipitor 40mg, famotidine.   A1c%: 6.5% Aug 2022 --> 6.6% 2023 --> 7.0% May/2023 --> 7.0% 2024 POCT B  FSG: Accu Check Guide 1x fasting  Pre-Breakfast: 112 to 126, 132 Pre-Lunch:  Pre-Dinner:  Bedtime:  Hypoglycemic episodes: None   Diet:  Breakfast: Oatmeal no sugar  Lunch: Slice of bread with coffee with salad or Few spoons of rice with meat  Dinner: Roti with vegetable or meat.  Snacks: Fruit: banana, apple, berries, pear. No juice or soda.   Exercise: Treadmill at home.  Urine micro: May 2023 wnl ACE:  C-peptide:  Cr: 1.0 GFR: 79 Lipid profile: TGs 107, LDL  Statin: Lipitor 40mg  HbA1c%: 7.0%  Ophthalmology: 2022, No DR.  Neuropathy: None Podiatry/Diabetic foot exam: 2024, podiatrist.

## 2024-03-01 ENCOUNTER — RX RENEWAL (OUTPATIENT)
Age: 69
End: 2024-03-01

## 2024-04-03 DIAGNOSIS — Z00.00 ENCOUNTER FOR GENERAL ADULT MEDICAL EXAMINATION W/OUT ABNORMAL FINDINGS: ICD-10-CM

## 2024-04-26 LAB
ALBUMIN SERPL ELPH-MCNC: 4.6 G/DL
ALP BLD-CCNC: 66 U/L
ALT SERPL-CCNC: 17 U/L
ANION GAP SERPL CALC-SCNC: 10 MMOL/L
AST SERPL-CCNC: 17 U/L
BASOPHILS # BLD AUTO: 0.03 K/UL
BASOPHILS NFR BLD AUTO: 0.6 %
BILIRUB SERPL-MCNC: 0.9 MG/DL
BUN SERPL-MCNC: 23 MG/DL
CALCIUM SERPL-MCNC: 9.6 MG/DL
CHLORIDE SERPL-SCNC: 103 MMOL/L
CHOLEST SERPL-MCNC: 154 MG/DL
CO2 SERPL-SCNC: 27 MMOL/L
CREAT SERPL-MCNC: 1.05 MG/DL
EGFR: 78 ML/MIN/1.73M2
EOSINOPHIL # BLD AUTO: 0.17 K/UL
EOSINOPHIL NFR BLD AUTO: 3.6 %
ESTIMATED AVERAGE GLUCOSE: 154 MG/DL
GLUCOSE SERPL-MCNC: 130 MG/DL
HBA1C MFR BLD HPLC: 7 %
HCT VFR BLD CALC: 42.7 %
HDLC SERPL-MCNC: 45 MG/DL
HGB BLD-MCNC: 13.5 G/DL
IMM GRANULOCYTES NFR BLD AUTO: 0.2 %
LDLC SERPL CALC-MCNC: 95 MG/DL
LYMPHOCYTES # BLD AUTO: 1.5 K/UL
LYMPHOCYTES NFR BLD AUTO: 32 %
MAN DIFF?: NORMAL
MCHC RBC-ENTMCNC: 29 PG
MCHC RBC-ENTMCNC: 31.6 GM/DL
MCV RBC AUTO: 91.8 FL
MONOCYTES # BLD AUTO: 0.36 K/UL
MONOCYTES NFR BLD AUTO: 7.7 %
NEUTROPHILS # BLD AUTO: 2.62 K/UL
NEUTROPHILS NFR BLD AUTO: 55.9 %
NONHDLC SERPL-MCNC: 109 MG/DL
PLATELET # BLD AUTO: 231 K/UL
POTASSIUM SERPL-SCNC: 4.4 MMOL/L
PROT SERPL-MCNC: 6.8 G/DL
RBC # BLD: 4.65 M/UL
RBC # FLD: 12.8 %
SODIUM SERPL-SCNC: 140 MMOL/L
TRIGL SERPL-MCNC: 74 MG/DL
TSH SERPL-ACNC: 0.86 UIU/ML
WBC # FLD AUTO: 4.69 K/UL

## 2024-05-04 ENCOUNTER — APPOINTMENT (OUTPATIENT)
Dept: INTERNAL MEDICINE | Facility: CLINIC | Age: 69
End: 2024-05-04
Payer: COMMERCIAL

## 2024-05-04 VITALS — SYSTOLIC BLOOD PRESSURE: 132 MMHG | DIASTOLIC BLOOD PRESSURE: 84 MMHG

## 2024-05-04 VITALS
WEIGHT: 140 LBS | HEART RATE: 87 BPM | RESPIRATION RATE: 16 BRPM | BODY MASS INDEX: 22.5 KG/M2 | HEIGHT: 66 IN | OXYGEN SATURATION: 98 %

## 2024-05-04 DIAGNOSIS — E11.9 TYPE 2 DIABETES MELLITUS W/OUT COMPLICATIONS: ICD-10-CM

## 2024-05-04 DIAGNOSIS — Z12.11 ENCOUNTER FOR SCREENING FOR MALIGNANT NEOPLASM OF COLON: ICD-10-CM

## 2024-05-04 DIAGNOSIS — I50.32 CHRONIC DIASTOLIC (CONGESTIVE) HEART FAILURE: ICD-10-CM

## 2024-05-04 DIAGNOSIS — K21.9 GASTRO-ESOPHAGEAL REFLUX DISEASE W/OUT ESOPHAGITIS: ICD-10-CM

## 2024-05-04 DIAGNOSIS — E07.9 DISORDER OF THYROID, UNSPECIFIED: ICD-10-CM

## 2024-05-04 DIAGNOSIS — I25.10 ATHEROSCLEROTIC HEART DISEASE OF NATIVE CORONARY ARTERY W/OUT ANGINA PECTORIS: ICD-10-CM

## 2024-05-04 DIAGNOSIS — R39.15 URGENCY OF URINATION: ICD-10-CM

## 2024-05-04 DIAGNOSIS — I65.29 OCCLUSION AND STENOSIS OF UNSPECIFIED CAROTID ARTERY: ICD-10-CM

## 2024-05-04 PROCEDURE — 99214 OFFICE O/P EST MOD 30 MIN: CPT

## 2024-05-04 RX ORDER — SITAGLIPTIN 100 MG/1
100 TABLET, FILM COATED ORAL DAILY
Qty: 1 | Refills: 2 | Status: DISCONTINUED | COMMUNITY
Start: 2024-01-15 | End: 2024-05-04

## 2024-05-04 NOTE — PHYSICAL EXAM
[No Acute Distress] : no acute distress [Well-Appearing] : well-appearing [Normal Sclera/Conjunctiva] : normal sclera/conjunctiva [PERRL] : pupils equal round and reactive to light [EOMI] : extraocular movements intact [Normal Outer Ear/Nose] : the outer ears and nose were normal in appearance [Normal Oropharynx] : the oropharynx was normal [No JVD] : no jugular venous distention [No Lymphadenopathy] : no lymphadenopathy [Supple] : supple [No Respiratory Distress] : no respiratory distress  [No Accessory Muscle Use] : no accessory muscle use [Clear to Auscultation] : lungs were clear to auscultation bilaterally [Normal Rate] : normal rate  [Regular Rhythm] : with a regular rhythm [Normal S1, S2] : normal S1 and S2 [No Murmur] : no murmur heard [No Carotid Bruits] : no carotid bruits [No Abdominal Bruit] : a ~M bruit was not heard ~T in the abdomen [No Varicosities] : no varicosities [Pedal Pulses Present] : the pedal pulses are present [No Edema] : there was no peripheral edema [No Palpable Aorta] : no palpable aorta [No Extremity Clubbing/Cyanosis] : no extremity clubbing/cyanosis [Soft] : abdomen soft [Non Tender] : non-tender [Non-distended] : non-distended [No Masses] : no abdominal mass palpated [No HSM] : no HSM [Normal Bowel Sounds] : normal bowel sounds [Normal Posterior Cervical Nodes] : no posterior cervical lymphadenopathy [Normal Anterior Cervical Nodes] : no anterior cervical lymphadenopathy [No CVA Tenderness] : no CVA  tenderness [No Spinal Tenderness] : no spinal tenderness [No Joint Swelling] : no joint swelling [Grossly Normal Strength/Tone] : grossly normal strength/tone [No Rash] : no rash [Coordination Grossly Intact] : coordination grossly intact [No Focal Deficits] : no focal deficits [Normal Gait] : normal gait [Deep Tendon Reflexes (DTR)] : deep tendon reflexes were 2+ and symmetric [Normal Affect] : the affect was normal [Normal Insight/Judgement] : insight and judgment were intact [de-identified] : thin [de-identified] : small nontender mobile mass left thyroid

## 2024-05-04 NOTE — ASSESSMENT
[FreeTextEntry1] : #Thyroid lump will get US thyroid/parathyroid  TSH wnl in April 2024 no unexplained weight loss, neck pain, dysphagia, sore throat likely lymph node since patient is very thin but definitely more pronounced on left side of neck    #Incontinence- likely urge incontinence  has seen urologist Dr. Reyes and was started on Trospium, did not like how it made him feel so stopped. Counselled on limiting amount of water he drinks before bedtime, and going to bathroom before he feels the need to urinate. send UA with microscopy. he wants to try nonpharmacologic therapy first before medication.   #DM2- controlled f/u with endocrine Dr. Oliveira. a1c 7.0->7.0. Pt will talk with endocrine about starting Januvia (Dr. Oliveira wanted his a1c to be less than 7 to avoid taking it).  urine albumin/creatinine ratio-> wasn't sent in September, will get now. c/w current medication regimen. UTD on diabetic eye/foot exams.   #CAD s/p PCI in 2012 to LAD #Chronic HFpEF- not in exacerbation #HLD #HTN f/u with cardio Dr. Orourke. c/w ASA 81mg, Lipitor 40mg. LDL 95, goal <70.  BP med Olmesartan was stopped; f/u with cardio about restarting (/84, was using for renoprotection).   #HCM UTD on flu shot, PNA shot; unsure if he got Shingrix but thinks he did, will find out. last colonoscopy 2014- referral given for Dr. Breanna Luque.

## 2024-05-04 NOTE — HISTORY OF PRESENT ILLNESS
[FreeTextEntry1] : bloodwork  [de-identified] : Pt comes to office to go over bloodwork. He follows with Dr. Oliveira endocrine and Dr. Becca Orourke cardio. Feels well. Had labs drawn in April. His only complaint is occasional urinary urgency and incontinence. He says that during the day he is fine since at work he is always near a bathroom, but at night has to wake up multiple times to use the bathroom and occasionally has accidents because he doesn't feel like his bladder is completely full. No dysuria, burning, fevers, flank pain. He has seen urologist in past and was put on medication but did not like the way it made him feel and stopped it.  No CP/SOB/palpitations/abdominal pain/nausea/vomiting/diarrhea/recent illnesses/sick contacts.

## 2024-05-06 ENCOUNTER — OUTPATIENT (OUTPATIENT)
Dept: OUTPATIENT SERVICES | Facility: HOSPITAL | Age: 69
LOS: 1 days | End: 2024-05-06
Payer: COMMERCIAL

## 2024-05-06 ENCOUNTER — APPOINTMENT (OUTPATIENT)
Dept: ULTRASOUND IMAGING | Facility: CLINIC | Age: 69
End: 2024-05-06
Payer: COMMERCIAL

## 2024-05-06 DIAGNOSIS — Z98.89 OTHER SPECIFIED POSTPROCEDURAL STATES: Chronic | ICD-10-CM

## 2024-05-06 DIAGNOSIS — E07.9 DISORDER OF THYROID, UNSPECIFIED: ICD-10-CM

## 2024-05-06 LAB
APPEARANCE: CLEAR
BACTERIA: NEGATIVE /HPF
BILIRUBIN URINE: NEGATIVE
BLOOD URINE: NEGATIVE
CAST: 0 /LPF
COLOR: YELLOW
CREAT SPEC-SCNC: 64 MG/DL
EPITHELIAL CELLS: 0 /HPF
GLUCOSE QUALITATIVE U: NEGATIVE MG/DL
KETONES URINE: NEGATIVE MG/DL
LEUKOCYTE ESTERASE URINE: NEGATIVE
MICROALBUMIN 24H UR DL<=1MG/L-MCNC: 1.6 MG/DL
MICROALBUMIN/CREAT 24H UR-RTO: 24 MG/G
MICROSCOPIC-UA: NORMAL
NITRITE URINE: NEGATIVE
PH URINE: 7
PROTEIN URINE: NEGATIVE MG/DL
RED BLOOD CELLS URINE: 2 /HPF
SPECIFIC GRAVITY URINE: 1.01
UROBILINOGEN URINE: 0.2 MG/DL
WHITE BLOOD CELLS URINE: 0 /HPF

## 2024-05-06 PROCEDURE — 76536 US EXAM OF HEAD AND NECK: CPT

## 2024-05-06 PROCEDURE — 76536 US EXAM OF HEAD AND NECK: CPT | Mod: 26

## 2024-05-07 NOTE — BRIEF OPERATIVE NOTE - OPERATION/FINDINGS
See MD Dictation Initiate Treatment: -Start Cephalexin 250mg 3 po QD\\n-Start Cabtreo QHS has tolerated to the facial skin (advised patient to start 2 to 3 nights a week alternating with Tretinoin .025% Cream) Continue Regimen: -clindamycin 1 % lotion QAM as tolerated (patient can discontinue once finished with present bottle)\\n-tretinoin 0.025 % topical cream QHS as tolerated \\n-Panoxyl Wash OTC Detail Level: Zone Render In Strict Bullet Format?: No Plan: Sunscreen Recommendation and Acne Regimen Handout reviewed with patient. Continue Regimen: Continue ketoconazole 2 % shampoo : QD-PRN flare of scalp\\nZoryve Cream QD-PRN flare of facial skin and body \\nclobetasol 0.05 % topical ointment BID-PRN rash/flare of psoriasis of body, avoid the face and groin. Plan: Briefly discussed biologic therapy, but patient is not presently interested.

## 2024-05-13 NOTE — ED ADULT TRIAGE NOTE - CHIEF COMPLAINT QUOTE
Patient was admitted with intermittent chest pain and uncontrolled HTN.  Cardiology was consulted.  She was initiated on Ranexa, Imdur and spironolactone.  Her EKG was nonischemic.  Her troponins were trended and remained negative.  Echocardiogram was obtained which revealed an EF of 60-65% with normal diastolic function.  She continued to have intermittent chest pain during her stay.  Patient was scheduled for left heart catheterization.  On 5/14/24, she underwent left heart catheterization with no stent placement required.  Post-procedure she was doing well and was cleared for discharge from the standpoint of cardiology.  Appropriate follow up was arranged.  Discharge instructions were reviewed and return precautions discussed.  She was discharged home in stable condition.   woke up from nap at 1700 felt dizzy, room spinning and diaphoretic.  went to bathroom to have a BM, wife pt had near syncopal episode and assisted pt to floor

## 2024-05-20 ENCOUNTER — APPOINTMENT (OUTPATIENT)
Dept: CARDIOLOGY | Facility: CLINIC | Age: 69
End: 2024-05-20
Payer: COMMERCIAL

## 2024-05-20 ENCOUNTER — NON-APPOINTMENT (OUTPATIENT)
Age: 69
End: 2024-05-20

## 2024-05-20 VITALS
SYSTOLIC BLOOD PRESSURE: 132 MMHG | OXYGEN SATURATION: 97 % | HEART RATE: 74 BPM | HEIGHT: 66 IN | BODY MASS INDEX: 23.63 KG/M2 | WEIGHT: 147 LBS | DIASTOLIC BLOOD PRESSURE: 80 MMHG

## 2024-05-20 VITALS — SYSTOLIC BLOOD PRESSURE: 118 MMHG | DIASTOLIC BLOOD PRESSURE: 70 MMHG

## 2024-05-20 DIAGNOSIS — R42 DIZZINESS AND GIDDINESS: ICD-10-CM

## 2024-05-20 DIAGNOSIS — E78.5 HYPERLIPIDEMIA, UNSPECIFIED: ICD-10-CM

## 2024-05-20 DIAGNOSIS — I10 ESSENTIAL (PRIMARY) HYPERTENSION: ICD-10-CM

## 2024-05-20 PROCEDURE — 99214 OFFICE O/P EST MOD 30 MIN: CPT

## 2024-05-20 PROCEDURE — 93000 ELECTROCARDIOGRAM COMPLETE: CPT

## 2024-05-20 PROCEDURE — G2211 COMPLEX E/M VISIT ADD ON: CPT

## 2024-05-20 NOTE — CARDIOLOGY SUMMARY
[de-identified] : SR [de-identified] : 12/2023 Tye DURAN [de-identified] : 4/19/21 nuc 10 METs, normal SPECT\par  4/2023  nuc 10 METs, normal SPECT  [de-identified] : 2017 normal LV function.  12/2022 normal LV function.  12/2023 normal LV function.  [de-identified] : 2018 mild disease 2023 carotid:  mild disease

## 2024-05-20 NOTE — DISCUSSION/SUMMARY
[FreeTextEntry1] : 68 year man with a history as listed presents for a followup cardiac evaluation.  Aren is doing well. He denies any anginal symptoms. Clinically he is euvolemic on exam. His EKG did not reveal any significant ischemic changes.  His blood pressure and heart rate are controlled.  He will continue with statin therapy to achieve maintain goal LDL<100 or ideally <70.  He will followup with endocrine.  Exercise and diet counseling was performed in order to reduce her future cardiovascular risk.  He will followup with me in 6 months or sooner if necessary.  [EKG obtained to assist in diagnosis and management of assessed problem(s)] : EKG obtained to assist in diagnosis and management of assessed problem(s)

## 2024-05-20 NOTE — HISTORY OF PRESENT ILLNESS
[FreeTextEntry1] : 67 year old man with a history of CAD s/p PCI in 2012 to LAD, HFPEF,  DM, HLD, GERD, presents for a follow-up visit.   Since his last visit, his lightheadedness has improved. He has been staying hydrated. The home BP is 120s/70s.   He exercises with the treadmill at 4mph for about 20 minutes without any symptoms. He has been going to [employer].  He   denies any chest pain, dyspnea,  PND, orthopnea, lower extremity edema, near syncope, syncope, strokelike symptoms.  Medication reconciliation performed. He is compliant with his medications.

## 2024-05-23 ENCOUNTER — NON-APPOINTMENT (OUTPATIENT)
Age: 69
End: 2024-05-23

## 2024-06-05 ENCOUNTER — RX RENEWAL (OUTPATIENT)
Age: 69
End: 2024-06-05

## 2024-06-05 RX ORDER — FAMOTIDINE 20 MG/1
20 TABLET, FILM COATED ORAL
Qty: 90 | Refills: 0 | Status: ACTIVE | COMMUNITY
Start: 2020-07-28 | End: 1900-01-01

## 2024-06-19 ENCOUNTER — APPOINTMENT (OUTPATIENT)
Dept: OTOLARYNGOLOGY | Facility: CLINIC | Age: 69
End: 2024-06-19
Payer: COMMERCIAL

## 2024-06-19 VITALS
HEART RATE: 69 BPM | BODY MASS INDEX: 22.5 KG/M2 | HEIGHT: 66 IN | DIASTOLIC BLOOD PRESSURE: 97 MMHG | SYSTOLIC BLOOD PRESSURE: 142 MMHG | WEIGHT: 140 LBS

## 2024-06-19 DIAGNOSIS — H90.3 SENSORINEURAL HEARING LOSS, BILATERAL: ICD-10-CM

## 2024-06-19 DIAGNOSIS — H61.20 IMPACTED CERUMEN, UNSPECIFIED EAR: ICD-10-CM

## 2024-06-19 PROCEDURE — G0268 REMOVAL OF IMPACTED WAX MD: CPT

## 2024-06-19 PROCEDURE — 92567 TYMPANOMETRY: CPT

## 2024-06-19 PROCEDURE — 99213 OFFICE O/P EST LOW 20 MIN: CPT | Mod: 25

## 2024-06-19 PROCEDURE — 92557 COMPREHENSIVE HEARING TEST: CPT

## 2024-06-19 NOTE — PHYSICAL EXAM
[Normal] : mucosa is normal [Midline] : trachea located in midline position [de-identified] : LEFT CERUMEN REMOVED

## 2024-07-11 ENCOUNTER — APPOINTMENT (OUTPATIENT)
Dept: UROLOGY | Facility: CLINIC | Age: 69
End: 2024-07-11
Payer: COMMERCIAL

## 2024-07-11 DIAGNOSIS — R39.15 URGENCY OF URINATION: ICD-10-CM

## 2024-07-11 DIAGNOSIS — N13.8 BENIGN PROSTATIC HYPERPLASIA WITH LOWER URINARY TRACT SYMPMS: ICD-10-CM

## 2024-07-11 DIAGNOSIS — N40.1 BENIGN PROSTATIC HYPERPLASIA WITH LOWER URINARY TRACT SYMPMS: ICD-10-CM

## 2024-07-11 DIAGNOSIS — R32 UNSPECIFIED URINARY INCONTINENCE: ICD-10-CM

## 2024-07-11 PROCEDURE — 99214 OFFICE O/P EST MOD 30 MIN: CPT

## 2024-07-11 RX ORDER — TAMSULOSIN HYDROCHLORIDE 0.4 MG/1
0.4 CAPSULE ORAL
Qty: 30 | Refills: 5 | Status: ACTIVE | COMMUNITY
Start: 2024-07-11 | End: 1900-01-01

## 2024-07-12 LAB
APPEARANCE: CLEAR
BACTERIA: NEGATIVE /HPF
BILIRUBIN URINE: NEGATIVE
BLOOD URINE: NEGATIVE
CAST: 0 /LPF
COLOR: YELLOW
EPITHELIAL CELLS: 0 /HPF
GLUCOSE QUALITATIVE U: NEGATIVE MG/DL
KETONES URINE: ABNORMAL MG/DL
LEUKOCYTE ESTERASE URINE: NEGATIVE
MICROSCOPIC-UA: NORMAL
NITRITE URINE: NEGATIVE
PH URINE: 5.5
RED BLOOD CELLS URINE: 0 /HPF
SPECIFIC GRAVITY URINE: 1.02
UROBILINOGEN URINE: 0.2 MG/DL
WHITE BLOOD CELLS URINE: 0 /HPF

## 2024-07-15 LAB — BACTERIA UR CULT: NORMAL

## 2024-07-18 ENCOUNTER — OUTPATIENT (OUTPATIENT)
Dept: OUTPATIENT SERVICES | Facility: HOSPITAL | Age: 69
LOS: 1 days | End: 2024-07-18
Payer: COMMERCIAL

## 2024-07-18 ENCOUNTER — APPOINTMENT (OUTPATIENT)
Dept: ULTRASOUND IMAGING | Facility: CLINIC | Age: 69
End: 2024-07-18
Payer: COMMERCIAL

## 2024-07-18 DIAGNOSIS — Z98.89 OTHER SPECIFIED POSTPROCEDURAL STATES: Chronic | ICD-10-CM

## 2024-07-18 DIAGNOSIS — R39.15 URGENCY OF URINATION: ICD-10-CM

## 2024-07-18 PROCEDURE — 76770 US EXAM ABDO BACK WALL COMP: CPT

## 2024-07-18 PROCEDURE — 76770 US EXAM ABDO BACK WALL COMP: CPT | Mod: 26

## 2024-08-01 ENCOUNTER — APPOINTMENT (OUTPATIENT)
Dept: ENDOCRINOLOGY | Facility: CLINIC | Age: 69
End: 2024-08-01

## 2024-08-14 ENCOUNTER — APPOINTMENT (OUTPATIENT)
Dept: ENDOCRINOLOGY | Facility: CLINIC | Age: 69
End: 2024-08-14
Payer: COMMERCIAL

## 2024-08-14 ENCOUNTER — RESULT CHARGE (OUTPATIENT)
Age: 69
End: 2024-08-14

## 2024-08-14 VITALS
SYSTOLIC BLOOD PRESSURE: 117 MMHG | OXYGEN SATURATION: 99 % | DIASTOLIC BLOOD PRESSURE: 76 MMHG | WEIGHT: 141 LBS | HEIGHT: 66 IN | HEART RATE: 85 BPM | BODY MASS INDEX: 22.66 KG/M2

## 2024-08-14 VITALS
HEIGHT: 66 IN | SYSTOLIC BLOOD PRESSURE: 109 MMHG | HEART RATE: 84 BPM | DIASTOLIC BLOOD PRESSURE: 67 MMHG | BODY MASS INDEX: 22.66 KG/M2 | WEIGHT: 141 LBS | OXYGEN SATURATION: 98 %

## 2024-08-14 DIAGNOSIS — E78.5 HYPERLIPIDEMIA, UNSPECIFIED: ICD-10-CM

## 2024-08-14 DIAGNOSIS — E11.9 TYPE 2 DIABETES MELLITUS W/OUT COMPLICATIONS: ICD-10-CM

## 2024-08-14 PROCEDURE — 83036 HEMOGLOBIN GLYCOSYLATED A1C: CPT | Mod: QW

## 2024-08-14 PROCEDURE — 99214 OFFICE O/P EST MOD 30 MIN: CPT | Mod: 25

## 2024-08-14 NOTE — PHYSICAL EXAM
[Alert] : alert [Well Nourished] : well nourished [No Acute Distress] : no acute distress [Well Developed] : well developed [Normal Sclera/Conjunctiva] : normal sclera/conjunctiva [EOMI] : extra ocular movement intact [No Proptosis] : no proptosis [Normal Oropharynx] : the oropharynx was normal [Thyroid Not Enlarged] : the thyroid was not enlarged [No Respiratory Distress] : no respiratory distress [No Accessory Muscle Use] : no accessory muscle use [Normal Rate] : heart rate was normal [No Edema] : no peripheral edema [Not Distended] : not distended [Normal Anterior Cervical Nodes] : no anterior cervical lymphadenopathy [No Spinal Tenderness] : no spinal tenderness [No Stigmata of Cushings Syndrome] : no stigmata of Cushings Syndrome [Normal Gait] : normal gait [Normal Strength/Tone] : muscle strength and tone were normal [No Rash] : no rash [Normal Reflexes] : deep tendon reflexes were 2+ and symmetric [No Tremors] : no tremors [Oriented x3] : oriented to person, place, and time [Acanthosis Nigricans] : no acanthosis nigricans [de-identified] : +firm gland

## 2024-08-14 NOTE — HISTORY OF PRESENT ILLNESS
[FreeTextEntry1] : Mr. Ivory is presenting for follow up care for his Diabetes.   68 year old male with PMH of GERD, CAD s/p JERSON to LAD 2012, HLD, HTN and diagnosed with DM for 15 years.   Reports no microvascular complications, no history of retinopathy, nephropathy or neuropathy. Reports no history of CHF or CVA in the past.   Current DM meds: metformin 1000mg BID (no GI side effects)  Prior DM meds:  Other pertinent meds: aspirin 81mg, lipitor 40mg, famotidine.   A1c%: 6.5% Aug 2022 --> 6.6% Feb 2023 --> 7.0% May/sept 2023 --> 7.0% Jan 2024 --> 7.0% Aug 2024  POCT BG:   FSG: Accu Check Guide 1x fasting  Pre-Breakfast: 115, 110s-120s Pre-Lunch:  Pre-Dinner:  2 hour PP: low 200s  Bedtime:  Hypoglycemic episodes: None   Diet:  Breakfast: Oatmeal no sugar  Lunch: Slice of bread with coffee with salad or Few spoons of rice with meat  Dinner: Roti with vegetable or meat.  Snacks: Fruit: banana, apple, berries, pear. No juice or soda.   Exercise: Treadmill at home.  Urine micro: May 2024 wnl ACE:  C-peptide:  Cr: 1.05 GFR: 78 Lipid profile: TGs 74, LDL 95 Jan 2024 Statin: Lipitor 40mg  HbA1c%: 7.0%  Ophthalmology: March 2024, No DR.  Neuropathy: None Podiatry/Diabetic foot exam: Feb 2024, podiatrist.    Interval hx:  Pt did not start januvia.

## 2024-08-14 NOTE — PHYSICAL EXAM
[Alert] : alert [Well Nourished] : well nourished [No Acute Distress] : no acute distress [Well Developed] : well developed [Normal Sclera/Conjunctiva] : normal sclera/conjunctiva [EOMI] : extra ocular movement intact [No Proptosis] : no proptosis [Normal Oropharynx] : the oropharynx was normal [Thyroid Not Enlarged] : the thyroid was not enlarged [No Respiratory Distress] : no respiratory distress [No Accessory Muscle Use] : no accessory muscle use [Normal Rate] : heart rate was normal [No Edema] : no peripheral edema [Not Distended] : not distended [Normal Anterior Cervical Nodes] : no anterior cervical lymphadenopathy [No Spinal Tenderness] : no spinal tenderness [No Stigmata of Cushings Syndrome] : no stigmata of Cushings Syndrome [Normal Gait] : normal gait [Normal Strength/Tone] : muscle strength and tone were normal [No Rash] : no rash [Normal Reflexes] : deep tendon reflexes were 2+ and symmetric [No Tremors] : no tremors [Oriented x3] : oriented to person, place, and time [Acanthosis Nigricans] : no acanthosis nigricans [de-identified] : +firm gland

## 2024-08-14 NOTE — ASSESSMENT
[Diabetes Foot Care] : diabetes foot care [Long Term Vascular Complications] : long term vascular complications of diabetes [Carbohydrate Consistent Diet] : carbohydrate consistent diet [Importance of Diet and Exercise] : importance of diet and exercise to improve glycemic control, achieve weight loss and improve cardiovascular health [Exercise/Effect on Glucose] : exercise/effect on glucose [Hypoglycemia Management] : hypoglycemia management [Self Monitoring of Blood Glucose] : self monitoring of blood glucose [Retinopathy Screening] : Patient was referred to ophthalmology for retinopathy screening [Diabetic Medications] : Risks and benefits of diabetic medications were discussed [FreeTextEntry1] : 68 year old male with PMH of GERD, CAD s/p JERSON to LAD 2012, HLD, HTN and diagnosed with DM for 15 years.  1. Type 2 diabetes mellitus. HbA1c 7.0% May/Sept 2023 --> 7.0% Jan 2024 --> 7.0% April 2024 --> 7.0% Aug 2024  -We discussed the cardiovascular and microvascular complications of uncontrolled diabetes. We discussed the importance of diet and exercise and lifestyle modification for glycemic control and weight loss. We discussed referral to our diabetes educator and nutrition. We discussed pharmacologic options for glycemic control. -We discussed GLP-1s given his CVD history but patient's BMI is 22 with minimal appetite. -Continue metformin 1000mg BID with food. Pt has no GI side effects. -Start Januvia 100mg QD  -Urine UTD -Continue lipitor for HLD,  LDL 95 -Continue to check SMBG 1x daily at varying times. Goal A1c is <6.5% -Opthal UTD -Avoid weight lose medications due to BMI 23.   Patient verbalized understanding of the above. All questions were answered to patient's satisfaction. Dispo: Patient will follow up in 3 months.

## 2024-08-15 LAB — HBA1C MFR BLD HPLC: 7

## 2024-08-21 ENCOUNTER — APPOINTMENT (OUTPATIENT)
Dept: OTOLARYNGOLOGY | Facility: CLINIC | Age: 69
End: 2024-08-21
Payer: COMMERCIAL

## 2024-08-21 VITALS
BODY MASS INDEX: 23.53 KG/M2 | SYSTOLIC BLOOD PRESSURE: 101 MMHG | HEART RATE: 79 BPM | WEIGHT: 146.38 LBS | HEIGHT: 66 IN | DIASTOLIC BLOOD PRESSURE: 66 MMHG

## 2024-08-21 DIAGNOSIS — H61.20 IMPACTED CERUMEN, UNSPECIFIED EAR: ICD-10-CM

## 2024-08-21 PROCEDURE — 99213 OFFICE O/P EST LOW 20 MIN: CPT | Mod: 25

## 2024-08-21 PROCEDURE — 69210 REMOVE IMPACTED EAR WAX UNI: CPT

## 2024-08-21 RX ORDER — SITAGLIPTIN 100 MG/1
TABLET, FILM COATED ORAL
Refills: 0 | Status: ACTIVE | COMMUNITY

## 2024-08-21 NOTE — PHYSICAL EXAM
[de-identified] : RIGHT CERUMEN REMOVED [Normal] : mucosa is normal [Midline] : trachea located in midline position

## 2024-09-04 ENCOUNTER — NON-APPOINTMENT (OUTPATIENT)
Age: 69
End: 2024-09-04

## 2024-09-05 ENCOUNTER — APPOINTMENT (OUTPATIENT)
Dept: UROLOGY | Facility: CLINIC | Age: 69
End: 2024-09-05
Payer: COMMERCIAL

## 2024-09-05 ENCOUNTER — APPOINTMENT (OUTPATIENT)
Dept: UROLOGY | Facility: CLINIC | Age: 69
End: 2024-09-05

## 2024-09-05 DIAGNOSIS — N13.8 BENIGN PROSTATIC HYPERPLASIA WITH LOWER URINARY TRACT SYMPMS: ICD-10-CM

## 2024-09-05 DIAGNOSIS — R39.15 URGENCY OF URINATION: ICD-10-CM

## 2024-09-05 DIAGNOSIS — N40.1 BENIGN PROSTATIC HYPERPLASIA WITH LOWER URINARY TRACT SYMPMS: ICD-10-CM

## 2024-09-05 PROCEDURE — 99214 OFFICE O/P EST MOD 30 MIN: CPT

## 2024-09-05 NOTE — ASSESSMENT
[FreeTextEntry1] : 68-year-old male with BPH and LUTS.  LUTS improved on tamsulosin 0.4 daily.  Patient pleased. Follow-up uroflow PVR IPSS.  We discussed the effects that BPH can cause on the bladder: BPH can cause detrusor overactivity which results in urgency and frequency and in some cases, incontinence. Over a longer time,some men may develop large volume/acontractile bladders, while other men develop small or normal volume bladders with loss of compliance. Neither is ideal and both represent end stage bladder damage that can not be fixed and can cause kidney injury. I explained the 3 main jobs of the bladder which are to 1) store urine, 2) evacuate urine and 3) keep urine at low pressure to prevent upper tract injury. I explained the mechanisms of urinary tract infections, hydronephrosis and kidney injury due to urinary retention.  These consequences can be severe, irreversible and even life threatening.   We then discussed the treatment options for BPH. We generally start with medical management and proceed to surgery if necessary. Alpha blockers act to relax the prostate while 5-alpha reductase inhibitors (5-Jordyn) shrink the prostate. 5-LISA's are most effective in patients who have prostates that are >40gm. The former are fairly fast acting (in a matter of days-weeks) while the latter can take up to 3-6 months to take effect.

## 2024-09-05 NOTE — REVIEW OF SYSTEMS
[see HPI] : see HPI [Negative] : Heme/Lymph [Patient Intake Form Reviewed] : Patient intake form was reviewed [Itching] : itching [Dizziness] : dizziness [Anxiety] : anxiety [Depression] : depression

## 2024-09-05 NOTE — SIGNATURES
[TextEntry] : Tino Bennett M.D. Minimally Invasive and Robotic Urologic Surgery [facility] | [facility]  of Urology [employer] at [facility] Tel: [phone] | Fax: [phone] | email: [email or web address]

## 2024-09-05 NOTE — PHYSICAL EXAM
[Normal] : mucosa is normal [Midline] : trachea located in midline position [de-identified] : LEFT CERUMEN REMOVED [Normal Appearance] : normal appearance [Well Groomed] : well groomed [General Appearance - In No Acute Distress] : no acute distress [Edema] : no peripheral edema [Respiration, Rhythm And Depth] : normal respiratory rhythm and effort [Exaggerated Use Of Accessory Muscles For Inspiration] : no accessory muscle use [Abdomen Soft] : soft [Abdomen Tenderness] : non-tender [Costovertebral Angle Tenderness] : no ~M costovertebral angle tenderness [Normal Station and Gait] : the gait and station were normal for the patient's age [] : no rash [No Focal Deficits] : no focal deficits [Oriented To Time, Place, And Person] : oriented to person, place, and time [Affect] : the affect was normal [Mood] : the mood was normal

## 2024-09-05 NOTE — REASON FOR VISIT
[Follow-up Visit ___] : a follow-up visit  for [unfilled] [Subsequent Evaluation] : a subsequent evaluation for [FreeTextEntry2] : clogged ears  [Initial Visit ___] : [unfilled] is here today for an initial visit  for [unfilled]

## 2024-09-05 NOTE — HISTORY OF PRESENT ILLNESS
[FreeTextEntry1] : Mr. VICTOR is a 68 year-year-old   M who comes today to clinic for urinary symptoms. Endorsing mild moderate severe LUTS for (see IPSS), both voiding and storage symptoms. HbA1c >7 No family history of Prostate cancer.  9/5/2024 LUTS improved on tamsulosin 0.4 daily.  Patient placed. Prostate 37 cc on transabdominal ultrasound. Denies fevers, chills, flank pain, hematuria, AUR. [de-identified] : HEARING TO BE CHECKED MEDICAL HX REVIEWED

## 2024-09-05 NOTE — SIGNATURES
[TextEntry] : Tino Bennett M.D. Minimally Invasive and Robotic Urologic Surgery [facility] | [facility]  of Urology [employer] at [facility] Tel: [phone] | Fax: [phone] | email: [email or web address]

## 2024-09-05 NOTE — HISTORY OF PRESENT ILLNESS
[FreeTextEntry1] : Mr. VICTOR is a 68 year-year-old   M who comes today to clinic for urinary symptoms. Endorsing mild moderate severe LUTS for (see IPSS), both voiding and storage symptoms. HbA1c >7 No family history of Prostate cancer.  9/5/2024 LUTS improved on tamsulosin 0.4 daily.  Patient placed. Prostate 37 cc on transabdominal ultrasound. Denies fevers, chills, flank pain, hematuria, AUR. [de-identified] : HEARING TO BE CHECKED MEDICAL HX REVIEWED

## 2024-09-05 NOTE — PHYSICAL EXAM
[Normal] : mucosa is normal [Midline] : trachea located in midline position [de-identified] : LEFT CERUMEN REMOVED [Normal Appearance] : normal appearance [Well Groomed] : well groomed [General Appearance - In No Acute Distress] : no acute distress [Edema] : no peripheral edema [Respiration, Rhythm And Depth] : normal respiratory rhythm and effort [Exaggerated Use Of Accessory Muscles For Inspiration] : no accessory muscle use [Abdomen Soft] : soft [Abdomen Tenderness] : non-tender [Costovertebral Angle Tenderness] : no ~M costovertebral angle tenderness [Normal Station and Gait] : the gait and station were normal for the patient's age [] : no rash [No Focal Deficits] : no focal deficits [Oriented To Time, Place, And Person] : oriented to person, place, and time [Affect] : the affect was normal [Mood] : the mood was normal

## 2024-09-25 ENCOUNTER — APPOINTMENT (OUTPATIENT)
Dept: GASTROENTEROLOGY | Facility: CLINIC | Age: 69
End: 2024-09-25
Payer: COMMERCIAL

## 2024-09-25 VITALS
DIASTOLIC BLOOD PRESSURE: 68 MMHG | SYSTOLIC BLOOD PRESSURE: 121 MMHG | OXYGEN SATURATION: 99 % | WEIGHT: 137 LBS | HEART RATE: 80 BPM | HEIGHT: 66 IN | BODY MASS INDEX: 22.02 KG/M2

## 2024-09-25 DIAGNOSIS — Z12.11 ENCOUNTER FOR SCREENING FOR MALIGNANT NEOPLASM OF COLON: ICD-10-CM

## 2024-09-25 PROCEDURE — G2211 COMPLEX E/M VISIT ADD ON: CPT

## 2024-09-25 PROCEDURE — 99203 OFFICE O/P NEW LOW 30 MIN: CPT

## 2024-09-25 RX ORDER — SODIUM SULFATE, POTASSIUM SULFATE AND MAGNESIUM SULFATE 1.6; 3.13; 17.5 G/177ML; G/177ML; G/177ML
17.5-3.13-1.6 SOLUTION ORAL
Qty: 1 | Refills: 0 | Status: ACTIVE | COMMUNITY
Start: 2024-09-25 | End: 1900-01-01

## 2024-09-25 NOTE — HISTORY OF PRESENT ILLNESS
[FreeTextEntry1] : This is a pleasant 68-year-old man with history of type 2 diabetes on metformin and Januvia presenting with symptoms of chronic GERD and colon cancer screening evaluation.  For the GERD, he has been taking famotidine 20 mg daily with good response.  Last upper endoscopy was in 2014.  He denies abdominal pain, nausea or vomiting.  He denies dysphagia or dyne aphasia.  He denies changes in bowel habits.  He denies rectal bleeding or melena.  He states he has lost some weight on metformin.  He denies family history of colon cancer or colon polyps.  Last colonoscopy was in 2014.

## 2024-09-25 NOTE — ASSESSMENT
[FreeTextEntry1] : This is a 68-year-old man with history of type 2 diabetes on metformin and Januvia presenting for symptoms of chronic GERD and colon cancer screening evaluation.  For the GERD, he will continue on famotidine 20 mg once or twice a day.  He is to eat small meals and avoid laying down after eating.  I recommend upper endoscopy to rule out complications of chronic GERD.  His last endoscopy was in 2014.  I recommend a screening colonoscopy.  His last colonoscopy was in 2014.  I explained to him the risks, alternatives and benefits to both upper endoscopy and colonoscopy.  Risk including but not limited to bleeding, perforation, infection and adverse medication reaction.  Questions were answered.  He stated understanding.

## 2024-09-25 NOTE — REASON FOR VISIT
[Initial Evaluation] : an initial evaluation [FreeTextEntry1] : chronic GERD, colon cancer screening

## 2024-09-25 NOTE — PHYSICAL EXAM

## 2024-09-30 ENCOUNTER — NON-APPOINTMENT (OUTPATIENT)
Age: 69
End: 2024-09-30

## 2024-09-30 ENCOUNTER — APPOINTMENT (OUTPATIENT)
Dept: INTERNAL MEDICINE | Facility: CLINIC | Age: 69
End: 2024-09-30
Payer: COMMERCIAL

## 2024-09-30 VITALS — OXYGEN SATURATION: 95 % | HEIGHT: 66 IN | BODY MASS INDEX: 22.18 KG/M2 | WEIGHT: 138 LBS | HEART RATE: 63 BPM

## 2024-09-30 VITALS — DIASTOLIC BLOOD PRESSURE: 64 MMHG | SYSTOLIC BLOOD PRESSURE: 120 MMHG

## 2024-09-30 DIAGNOSIS — Z23 ENCOUNTER FOR IMMUNIZATION: ICD-10-CM

## 2024-09-30 DIAGNOSIS — Z00.00 ENCOUNTER FOR GENERAL ADULT MEDICAL EXAMINATION W/OUT ABNORMAL FINDINGS: ICD-10-CM

## 2024-09-30 DIAGNOSIS — E78.5 HYPERLIPIDEMIA, UNSPECIFIED: ICD-10-CM

## 2024-09-30 DIAGNOSIS — I50.32 CHRONIC DIASTOLIC (CONGESTIVE) HEART FAILURE: ICD-10-CM

## 2024-09-30 DIAGNOSIS — E11.9 TYPE 2 DIABETES MELLITUS W/OUT COMPLICATIONS: ICD-10-CM

## 2024-09-30 DIAGNOSIS — K21.9 GASTRO-ESOPHAGEAL REFLUX DISEASE W/OUT ESOPHAGITIS: ICD-10-CM

## 2024-09-30 DIAGNOSIS — I65.29 OCCLUSION AND STENOSIS OF UNSPECIFIED CAROTID ARTERY: ICD-10-CM

## 2024-09-30 DIAGNOSIS — I25.10 ATHEROSCLEROTIC HEART DISEASE OF NATIVE CORONARY ARTERY W/OUT ANGINA PECTORIS: ICD-10-CM

## 2024-09-30 PROCEDURE — 93000 ELECTROCARDIOGRAM COMPLETE: CPT

## 2024-09-30 PROCEDURE — 90471 IMMUNIZATION ADMIN: CPT

## 2024-09-30 PROCEDURE — 36415 COLL VENOUS BLD VENIPUNCTURE: CPT

## 2024-09-30 PROCEDURE — 99397 PER PM REEVAL EST PAT 65+ YR: CPT | Mod: 25

## 2024-09-30 PROCEDURE — 90662 IIV NO PRSV INCREASED AG IM: CPT

## 2024-09-30 NOTE — PHYSICAL EXAM
[No Acute Distress] : no acute distress [Normal Sclera/Conjunctiva] : normal sclera/conjunctiva [Normal Outer Ear/Nose] : the outer ears and nose were normal in appearance [No JVD] : no jugular venous distention [No Lymphadenopathy] : no lymphadenopathy [No Respiratory Distress] : no respiratory distress  [No Accessory Muscle Use] : no accessory muscle use [Normal Rate] : normal rate  [Regular Rhythm] : with a regular rhythm [No Carotid Bruits] : no carotid bruits [No Edema] : there was no peripheral edema [No Extremity Clubbing/Cyanosis] : no extremity clubbing/cyanosis [Soft] : abdomen soft [Non Tender] : non-tender [Non-distended] : non-distended [No Masses] : no abdominal mass palpated [Normal Bowel Sounds] : normal bowel sounds [Normal Anterior Cervical Nodes] : no anterior cervical lymphadenopathy [No Spinal Tenderness] : no spinal tenderness [Grossly Normal Strength/Tone] : grossly normal strength/tone [No Rash] : no rash [No Focal Deficits] : no focal deficits [Speech Grossly Normal] : speech grossly normal [Memory Grossly Normal] : memory grossly normal [Normal Affect] : the affect was normal [Normal Mood] : the mood was normal [Normal Insight/Judgement] : insight and judgment were intact

## 2024-09-30 NOTE — HEALTH RISK ASSESSMENT
[Never (0 pts)] : Never (0 points) [No] : In the past 12 months have you used drugs other than those required for medical reasons? No [No falls in past year] : Patient reported no falls in the past year [0] : 2) Feeling down, depressed, or hopeless: Not at all (0) [PHQ-2 Negative - No further assessment needed] : PHQ-2 Negative - No further assessment needed [Never] : Never [Audit-CScore] : 0 [[id number]] : 0

## 2024-09-30 NOTE — HISTORY OF PRESENT ILLNESS
[de-identified] : Pt comes for adult well visit  PT sees Dr Orourke cardio and Dr Luque gi and Dr Thorpe urology  Pt get vertigo episodes every once in a while  WIll be going for EGD and colonoscopy soon

## 2024-09-30 NOTE — REVIEW OF SYSTEMS
[Fever] : no fever [Chills] : no chills [Discharge] : no discharge [Vision Problems] : no vision problems [Earache] : no earache [Sore Throat] : no sore throat [Chest Pain] : no chest pain [Palpitations] : no palpitations [Lower Ext Edema] : no lower extremity edema [Shortness Of Breath] : no shortness of breath [Wheezing] : no wheezing [Cough] : no cough [Abdominal Pain] : no abdominal pain [Dysuria] : no dysuria [Hematuria] : no hematuria [Joint Pain] : no joint pain [Joint Stiffness] : no joint stiffness [Skin Rash] : no skin rash [Headache] : no headache [Dizziness] : no dizziness [Depression] : no depression [Swollen Glands] : no swollen glands

## 2024-10-02 LAB
ALBUMIN SERPL ELPH-MCNC: 4.5 G/DL
ALP BLD-CCNC: 74 U/L
ALT SERPL-CCNC: 16 U/L
ANION GAP SERPL CALC-SCNC: 16 MMOL/L
AST SERPL-CCNC: 19 U/L
BASOPHILS # BLD AUTO: 0.05 K/UL
BASOPHILS NFR BLD AUTO: 1.2 %
BILIRUB SERPL-MCNC: 1.1 MG/DL
BUN SERPL-MCNC: 18 MG/DL
CALCIUM SERPL-MCNC: 9.4 MG/DL
CHLORIDE SERPL-SCNC: 104 MMOL/L
CHOLEST SERPL-MCNC: 130 MG/DL
CO2 SERPL-SCNC: 25 MMOL/L
CREAT SERPL-MCNC: 0.89 MG/DL
CREAT SPEC-SCNC: 149 MG/DL
EGFR: 94 ML/MIN/1.73M2
EOSINOPHIL # BLD AUTO: 0.21 K/UL
EOSINOPHIL NFR BLD AUTO: 4.9 %
ESTIMATED AVERAGE GLUCOSE: 151 MG/DL
GLUCOSE SERPL-MCNC: 110 MG/DL
HBA1C MFR BLD HPLC: 6.9 %
HCT VFR BLD CALC: 44.8 %
HDLC SERPL-MCNC: 42 MG/DL
HGB BLD-MCNC: 13.7 G/DL
IMM GRANULOCYTES NFR BLD AUTO: 0.2 %
LDLC SERPL CALC-MCNC: 74 MG/DL
LYMPHOCYTES # BLD AUTO: 1.25 K/UL
LYMPHOCYTES NFR BLD AUTO: 29.1 %
MAN DIFF?: NORMAL
MCHC RBC-ENTMCNC: 28.8 PG
MCHC RBC-ENTMCNC: 30.6 GM/DL
MCV RBC AUTO: 94.3 FL
MICROALBUMIN 24H UR DL<=1MG/L-MCNC: <1.2 MG/DL
MICROALBUMIN/CREAT 24H UR-RTO: NORMAL MG/G
MONOCYTES # BLD AUTO: 0.36 K/UL
MONOCYTES NFR BLD AUTO: 8.4 %
NEUTROPHILS # BLD AUTO: 2.42 K/UL
NEUTROPHILS NFR BLD AUTO: 56.2 %
NONHDLC SERPL-MCNC: 88 MG/DL
PLATELET # BLD AUTO: 223 K/UL
POTASSIUM SERPL-SCNC: 4.4 MMOL/L
PROT SERPL-MCNC: 7.1 G/DL
PSA SERPL-MCNC: 1.52 NG/ML
RBC # BLD: 4.75 M/UL
RBC # FLD: 12.8 %
SODIUM SERPL-SCNC: 145 MMOL/L
TRIGL SERPL-MCNC: 68 MG/DL
TSH SERPL-ACNC: 0.85 UIU/ML
WBC # FLD AUTO: 4.3 K/UL

## 2024-11-22 ENCOUNTER — APPOINTMENT (OUTPATIENT)
Dept: ENDOCRINOLOGY | Facility: CLINIC | Age: 69
End: 2024-11-22
Payer: COMMERCIAL

## 2024-11-22 VITALS
OXYGEN SATURATION: 99 % | SYSTOLIC BLOOD PRESSURE: 133 MMHG | BODY MASS INDEX: 22.18 KG/M2 | HEART RATE: 80 BPM | DIASTOLIC BLOOD PRESSURE: 72 MMHG | HEIGHT: 66 IN | WEIGHT: 138 LBS

## 2024-11-22 DIAGNOSIS — E11.9 TYPE 2 DIABETES MELLITUS W/OUT COMPLICATIONS: ICD-10-CM

## 2024-11-22 DIAGNOSIS — E78.5 HYPERLIPIDEMIA, UNSPECIFIED: ICD-10-CM

## 2024-11-22 PROCEDURE — 99214 OFFICE O/P EST MOD 30 MIN: CPT

## 2024-12-23 ENCOUNTER — APPOINTMENT (OUTPATIENT)
Dept: INTERNAL MEDICINE | Facility: CLINIC | Age: 69
End: 2024-12-23
Payer: COMMERCIAL

## 2024-12-23 VITALS
OXYGEN SATURATION: 99 % | TEMPERATURE: 98 F | HEART RATE: 98 BPM | WEIGHT: 139 LBS | HEIGHT: 66 IN | BODY MASS INDEX: 22.34 KG/M2

## 2024-12-23 VITALS — SYSTOLIC BLOOD PRESSURE: 128 MMHG | DIASTOLIC BLOOD PRESSURE: 64 MMHG

## 2024-12-23 DIAGNOSIS — J30.9 ALLERGIC RHINITIS, UNSPECIFIED: ICD-10-CM

## 2024-12-23 DIAGNOSIS — J40 BRONCHITIS, NOT SPECIFIED AS ACUTE OR CHRONIC: ICD-10-CM

## 2024-12-23 PROCEDURE — G2211 COMPLEX E/M VISIT ADD ON: CPT

## 2024-12-23 PROCEDURE — 99213 OFFICE O/P EST LOW 20 MIN: CPT

## 2024-12-23 RX ORDER — AZITHROMYCIN 250 MG/1
250 TABLET, FILM COATED ORAL
Qty: 1 | Refills: 0 | Status: ACTIVE | COMMUNITY
Start: 2024-12-23 | End: 1900-01-01

## 2024-12-23 RX ORDER — FLUTICASONE PROPIONATE 50 UG/1
50 SPRAY, METERED NASAL
Qty: 1 | Refills: 2 | Status: ACTIVE | COMMUNITY
Start: 2024-12-23 | End: 1900-01-01

## 2024-12-23 RX ORDER — PROMETHAZINE HYDROCHLORIDE 6.25 MG/5ML
6.25 SOLUTION ORAL 4 TIMES DAILY
Qty: 140 | Refills: 0 | Status: ACTIVE | COMMUNITY
Start: 2024-12-23 | End: 1900-01-01

## 2024-12-23 RX ORDER — GUAIFENESIN 600 MG/1
600 TABLET, EXTENDED RELEASE ORAL TWICE DAILY
Qty: 14 | Refills: 0 | Status: ACTIVE | COMMUNITY
Start: 2024-12-23 | End: 1900-01-01

## 2025-01-09 ENCOUNTER — OUTPATIENT (OUTPATIENT)
Dept: OUTPATIENT SERVICES | Facility: HOSPITAL | Age: 70
LOS: 1 days | End: 2025-01-09
Payer: COMMERCIAL

## 2025-01-09 ENCOUNTER — TRANSCRIPTION ENCOUNTER (OUTPATIENT)
Age: 70
End: 2025-01-09

## 2025-01-09 ENCOUNTER — APPOINTMENT (OUTPATIENT)
Dept: GASTROENTEROLOGY | Facility: HOSPITAL | Age: 70
End: 2025-01-09

## 2025-01-09 ENCOUNTER — RESULT REVIEW (OUTPATIENT)
Age: 70
End: 2025-01-09

## 2025-01-09 VITALS
RESPIRATION RATE: 14 BRPM | HEIGHT: 66 IN | SYSTOLIC BLOOD PRESSURE: 137 MMHG | HEART RATE: 72 BPM | TEMPERATURE: 97 F | WEIGHT: 134.92 LBS | DIASTOLIC BLOOD PRESSURE: 67 MMHG | OXYGEN SATURATION: 97 %

## 2025-01-09 VITALS
DIASTOLIC BLOOD PRESSURE: 80 MMHG | HEART RATE: 61 BPM | SYSTOLIC BLOOD PRESSURE: 160 MMHG | OXYGEN SATURATION: 100 % | RESPIRATION RATE: 20 BRPM

## 2025-01-09 DIAGNOSIS — Z12.11 ENCOUNTER FOR SCREENING FOR MALIGNANT NEOPLASM OF COLON: ICD-10-CM

## 2025-01-09 DIAGNOSIS — Z98.89 OTHER SPECIFIED POSTPROCEDURAL STATES: Chronic | ICD-10-CM

## 2025-01-09 DIAGNOSIS — K21.9 GASTRO-ESOPHAGEAL REFLUX DISEASE WITHOUT ESOPHAGITIS: ICD-10-CM

## 2025-01-09 LAB — GLUCOSE BLDC GLUCOMTR-MCNC: 114 MG/DL — HIGH (ref 70–99)

## 2025-01-09 PROCEDURE — 43239 EGD BIOPSY SINGLE/MULTIPLE: CPT | Mod: GC

## 2025-01-09 PROCEDURE — 82962 GLUCOSE BLOOD TEST: CPT

## 2025-01-09 PROCEDURE — 88305 TISSUE EXAM BY PATHOLOGIST: CPT | Mod: 26

## 2025-01-09 PROCEDURE — 43239 EGD BIOPSY SINGLE/MULTIPLE: CPT

## 2025-01-09 PROCEDURE — 88305 TISSUE EXAM BY PATHOLOGIST: CPT

## 2025-01-09 PROCEDURE — 45378 DIAGNOSTIC COLONOSCOPY: CPT | Mod: GC

## 2025-01-09 PROCEDURE — G0121: CPT

## 2025-01-09 DEVICE — NET RETRV ROT ROTH 2.5MMX230CM: Type: IMPLANTABLE DEVICE | Status: FUNCTIONAL

## 2025-01-09 RX ORDER — SODIUM CHLORIDE 9 MG/ML
500 INJECTION, SOLUTION INTRAMUSCULAR; INTRAVENOUS; SUBCUTANEOUS
Refills: 0 | Status: COMPLETED | OUTPATIENT
Start: 2025-01-09 | End: 2025-01-09

## 2025-01-09 RX ADMIN — SODIUM CHLORIDE 30 MILLILITER(S): 9 INJECTION, SOLUTION INTRAMUSCULAR; INTRAVENOUS; SUBCUTANEOUS at 08:49

## 2025-01-09 NOTE — ASU PREOP CHECKLIST - LAST TOOK
clears [Headache] : headache [Dizziness] : dizziness [Memory Loss] : no memory loss [Negative] : Psychiatric

## 2025-01-09 NOTE — ASU DISCHARGE PLAN (ADULT/PEDIATRIC) - DISCHARGE PLAN IS COMPLETE AND GIVEN TO PATIENT
PCP is DINORAH Carvajal in [place]; added in [employer].  Verified insurance as medicare and [place] medicaid.  Patient did not have a preference in pharmacy, however stated she prefers paper prescriptions.  Patient lives at [facility] in [place].  Pt's assigned  is Rubén Bunch ([phone]).  Patient states that Rubén will pick her up at discharge.  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  D/c plan is home; no needs.       11/07/17 1103   Discharge Assessment   Assessment Type Discharge Planning Assessment   Confirmed/corrected address and phone number on facesheet? Yes   Assessment information obtained from? Patient   Prior to hospitilization cognitive status: Alert/Oriented   Prior to hospitalization functional status: Independent   Current cognitive status: Alert/Oriented   Current Functional Status: Independent   Lives With other (see comments)  (group home)   Able to Return to Prior Arrangements yes   Is patient able to care for self after discharge? Yes   Patient's perception of discharge disposition group home   Patient currently being followed by outpatient case management? No   Patient currently receives any other outside agency services? No   Equipment Currently Used at Home none   Do you have any problems affording any of your prescribed medications? No   Is the patient taking medications as prescribed? yes   Does the patient have transportation home? Yes   Transportation Available agency transportation   Does the patient receive services at [facility]? No   Discharge Plan A Group Home   Patient/Family In Agreement With Plan yes      : Yes

## 2025-01-09 NOTE — PRE PROCEDURE NOTE - PRE PROCEDURE EVALUATION
Attending Physician:             Breanna Craven               Procedure: upper endoscopy, colonoscopy    Indication for Procedure: GERD, screening  ________________________________________________________  PAST MEDICAL & SURGICAL HISTORY:  H/O hyperlipidemia      Diabetes mellitus type II      History of seasonal allergies      CAD (coronary artery disease)      GERD (gastroesophageal reflux disease)      H/O syncope  March 7      Sprain of left rotator cuff capsule, initial encounter      Status post angioplasty with stent  x1 2012        ALLERGIES:  No Known Allergies    HOME MEDICATIONS:  aspirin 81 mg oral tablet: 1 tab(s) orally once a day  famotidine 20 mg oral tablet: 1 tab(s) orally once a day  Lipitor 40 mg oral tablet: 1 tab(s) orally once a day (at bedtime)  metFORMIN 500 mg oral tablet: 1 tab(s) orally once a day  metFORMIN 500 mg oral tablet: 2 tab(s) orally once a day (in the evening)    AICD/PPM: [ ] yes   [ ] no    PERTINENT LAB DATA:                      PHYSICAL EXAMINATION:    T(C): --  HR: --  BP: --  RR: --  SpO2: --    Constitutional: NAD  HEENT: PERRLA, EOMI,    Neck:  No JVD  Respiratory: CTAB/L  Cardiovascular: S1 and S2  Gastrointestinal: BS+, soft, NT/ND  Extremities: No peripheral edema  Neurological: A/O x 3, no focal deficits  Psychiatric: Normal mood, normal affect  Skin: No rashes    ASA Class: I [ ]  II [ ]  III [ ]  IV [ ]    COMMENTS:    The patient is a suitable candidate for the planned procedure unless box checked [ ]  No, explain:

## 2025-01-09 NOTE — ASU DISCHARGE PLAN (ADULT/PEDIATRIC) - NS MD DC FALL RISK RISK
For information on Fall & Injury Prevention, visit: [email or web address] OR  [email or web address] OR  https://www.cdc.gov/steadi/patient.html

## 2025-01-09 NOTE — ASU DISCHARGE PLAN (ADULT/PEDIATRIC) - FINANCIAL ASSISTANCE
[facility] provides services at a reduced cost to those who are determined to be eligible through [facility]’s financial assistance program. Information regarding [facility]’s financial assistance program can be found by going to [email or web address] or by calling [phone].

## 2025-01-10 ENCOUNTER — APPOINTMENT (OUTPATIENT)
Dept: UROLOGY | Facility: CLINIC | Age: 70
End: 2025-01-10

## 2025-01-10 NOTE — ED PROVIDER NOTE - CROS ED RESP ALL NEG
[negative...

## 2025-01-13 LAB — SURGICAL PATHOLOGY STUDY: SIGNIFICANT CHANGE UP

## 2025-01-14 DIAGNOSIS — K21.9 GASTRO-ESOPHAGEAL REFLUX DISEASE W/OUT ESOPHAGITIS: ICD-10-CM

## 2025-01-14 DIAGNOSIS — I10 ESSENTIAL (PRIMARY) HYPERTENSION: ICD-10-CM

## 2025-01-14 DIAGNOSIS — E11.9 TYPE 2 DIABETES MELLITUS W/OUT COMPLICATIONS: ICD-10-CM

## 2025-01-14 DIAGNOSIS — I25.10 ATHEROSCLEROTIC HEART DISEASE OF NATIVE CORONARY ARTERY W/OUT ANGINA PECTORIS: ICD-10-CM

## 2025-01-14 DIAGNOSIS — E78.5 HYPERLIPIDEMIA, UNSPECIFIED: ICD-10-CM

## 2025-01-15 ENCOUNTER — NON-APPOINTMENT (OUTPATIENT)
Age: 70
End: 2025-01-15

## 2025-01-15 RX ORDER — SITAGLIPTIN 100 MG/1
100 TABLET, FILM COATED ORAL DAILY
Qty: 90 | Refills: 3 | Status: ACTIVE | COMMUNITY
Start: 2025-01-15 | End: 1900-01-01

## 2025-01-16 LAB
BASOPHILS # BLD AUTO: 0.03 K/UL
BASOPHILS NFR BLD AUTO: 0.5 %
EOSINOPHIL # BLD AUTO: 0.29 K/UL
EOSINOPHIL NFR BLD AUTO: 5 %
HCT VFR BLD CALC: 42.4 %
HGB BLD-MCNC: 13.5 G/DL
IMM GRANULOCYTES NFR BLD AUTO: 0.2 %
LYMPHOCYTES # BLD AUTO: 1.46 K/UL
LYMPHOCYTES NFR BLD AUTO: 25.3 %
MAN DIFF?: NORMAL
MCHC RBC-ENTMCNC: 29 PG
MCHC RBC-ENTMCNC: 31.8 G/DL
MCV RBC AUTO: 91.2 FL
MONOCYTES # BLD AUTO: 0.44 K/UL
MONOCYTES NFR BLD AUTO: 7.6 %
NEUTROPHILS # BLD AUTO: 3.54 K/UL
NEUTROPHILS NFR BLD AUTO: 61.4 %
PLATELET # BLD AUTO: 224 K/UL
RBC # BLD: 4.65 M/UL
RBC # FLD: 12.8 %
WBC # FLD AUTO: 5.77 K/UL

## 2025-01-17 LAB
ALBUMIN SERPL ELPH-MCNC: 4.4 G/DL
ALP BLD-CCNC: 74 U/L
ALT SERPL-CCNC: 15 U/L
ANION GAP SERPL CALC-SCNC: 11 MMOL/L
AST SERPL-CCNC: 16 U/L
BILIRUB SERPL-MCNC: 0.8 MG/DL
BUN SERPL-MCNC: 26 MG/DL
CALCIUM SERPL-MCNC: 9.5 MG/DL
CHLORIDE SERPL-SCNC: 102 MMOL/L
CHOLEST SERPL-MCNC: 150 MG/DL
CO2 SERPL-SCNC: 28 MMOL/L
CREAT SERPL-MCNC: 1.17 MG/DL
EGFR: 68 ML/MIN/1.73M2
ESTIMATED AVERAGE GLUCOSE: 143 MG/DL
GLUCOSE SERPL-MCNC: 110 MG/DL
HBA1C MFR BLD HPLC: 6.6 %
HDLC SERPL-MCNC: 44 MG/DL
LDLC SERPL CALC-MCNC: 91 MG/DL
NONHDLC SERPL-MCNC: 106 MG/DL
POTASSIUM SERPL-SCNC: 4.8 MMOL/L
PROT SERPL-MCNC: 7 G/DL
SODIUM SERPL-SCNC: 139 MMOL/L
TRIGL SERPL-MCNC: 80 MG/DL
TSH SERPL-ACNC: 1.08 UIU/ML

## 2025-01-17 RX ORDER — ATORVASTATIN CALCIUM 40 MG/1
40 TABLET, FILM COATED ORAL
Qty: 90 | Refills: 0 | Status: ACTIVE | COMMUNITY
Start: 2025-01-17 | End: 1900-01-01

## 2025-02-19 ENCOUNTER — LABORATORY RESULT (OUTPATIENT)
Age: 70
End: 2025-02-19

## 2025-02-19 ENCOUNTER — APPOINTMENT (OUTPATIENT)
Dept: INTERNAL MEDICINE | Facility: CLINIC | Age: 70
End: 2025-02-19
Payer: COMMERCIAL

## 2025-02-19 VITALS — HEART RATE: 80 BPM | BODY MASS INDEX: 22.66 KG/M2 | OXYGEN SATURATION: 98 % | WEIGHT: 141 LBS | HEIGHT: 66 IN

## 2025-02-19 VITALS — SYSTOLIC BLOOD PRESSURE: 120 MMHG | DIASTOLIC BLOOD PRESSURE: 58 MMHG

## 2025-02-19 DIAGNOSIS — R20.0 ANESTHESIA OF SKIN: ICD-10-CM

## 2025-02-19 DIAGNOSIS — K11.7 DISTURBANCES OF SALIVARY SECRETION: ICD-10-CM

## 2025-02-19 PROCEDURE — 99214 OFFICE O/P EST MOD 30 MIN: CPT

## 2025-02-19 PROCEDURE — 36415 COLL VENOUS BLD VENIPUNCTURE: CPT

## 2025-02-19 PROCEDURE — G2211 COMPLEX E/M VISIT ADD ON: CPT

## 2025-02-24 LAB
ACE BLD-CCNC: 28 U/L
ALBUMIN MFR SERPL ELPH: 63.9 %
ALBUMIN SERPL-MCNC: 4.5 G/DL
ALBUMIN/GLOB SERPL: 1.7 RATIO
ALPHA1 GLOB MFR SERPL ELPH: 3.1 %
ALPHA1 GLOB SERPL ELPH-MCNC: 0.2 G/DL
ALPHA2 GLOB MFR SERPL ELPH: 7.1 %
ALPHA2 GLOB SERPL ELPH-MCNC: 0.5 G/DL
ANA SER IF-ACNC: NEGATIVE
B-GLOBULIN MFR SERPL ELPH: 10.8 %
B-GLOBULIN SERPL ELPH-MCNC: 0.8 G/DL
BASOPHILS # BLD AUTO: 0.03 K/UL
BASOPHILS NFR BLD AUTO: 0.7 %
CRP SERPL-MCNC: <3 MG/L
ENA SS-A AB SER IA-ACNC: <0.2 AL
ENA SS-B AB SER IA-ACNC: <0.2 AL
EOSINOPHIL # BLD AUTO: 0.23 K/UL
EOSINOPHIL NFR BLD AUTO: 5.3 %
ERYTHROCYTE [SEDIMENTATION RATE] IN BLOOD BY WESTERGREN METHOD: 8 MM/HR
GAMMA GLOB FLD ELPH-MCNC: 1.1 G/DL
GAMMA GLOB MFR SERPL ELPH: 15.1 %
HCT VFR BLD CALC: 42.2 %
HGB BLD-MCNC: 13.6 G/DL
IMM GRANULOCYTES NFR BLD AUTO: 0.2 %
INTERPRETATION SERPL IEP-IMP: NORMAL
LDH SERPL-CCNC: 160 U/L
LYMPHOCYTES # BLD AUTO: 1.46 K/UL
LYMPHOCYTES NFR BLD AUTO: 33.3 %
M PROTEIN SPEC IFE-MCNC: NORMAL
MAN DIFF?: NORMAL
MCHC RBC-ENTMCNC: 29.9 PG
MCHC RBC-ENTMCNC: 32.2 G/DL
MCV RBC AUTO: 92.7 FL
MONOCYTES # BLD AUTO: 0.34 K/UL
MONOCYTES NFR BLD AUTO: 7.8 %
NEUTROPHILS # BLD AUTO: 2.31 K/UL
NEUTROPHILS NFR BLD AUTO: 52.7 %
PLATELET # BLD AUTO: 227 K/UL
PROT SERPL-MCNC: 7.1 G/DL
PROT SERPL-MCNC: 7.1 G/DL
RBC # BLD: 4.55 M/UL
RBC # FLD: 12.7 %
T PALLIDUM AB SER QL IA: NEGATIVE
WBC # FLD AUTO: 4.38 K/UL

## 2025-03-10 ENCOUNTER — RX RENEWAL (OUTPATIENT)
Age: 70
End: 2025-03-10

## 2025-03-26 ENCOUNTER — APPOINTMENT (OUTPATIENT)
Dept: ENDOCRINOLOGY | Facility: CLINIC | Age: 70
End: 2025-03-26
Payer: COMMERCIAL

## 2025-03-26 VITALS
HEART RATE: 75 BPM | BODY MASS INDEX: 22.5 KG/M2 | OXYGEN SATURATION: 99 % | HEIGHT: 66 IN | DIASTOLIC BLOOD PRESSURE: 70 MMHG | SYSTOLIC BLOOD PRESSURE: 118 MMHG | WEIGHT: 140 LBS

## 2025-03-26 DIAGNOSIS — E11.9 TYPE 2 DIABETES MELLITUS W/OUT COMPLICATIONS: ICD-10-CM

## 2025-03-26 DIAGNOSIS — E78.5 HYPERLIPIDEMIA, UNSPECIFIED: ICD-10-CM

## 2025-03-26 PROCEDURE — 99214 OFFICE O/P EST MOD 30 MIN: CPT

## 2025-03-27 ENCOUNTER — APPOINTMENT (OUTPATIENT)
Dept: ULTRASOUND IMAGING | Facility: CLINIC | Age: 70
End: 2025-03-27
Payer: COMMERCIAL

## 2025-03-27 ENCOUNTER — OUTPATIENT (OUTPATIENT)
Dept: OUTPATIENT SERVICES | Facility: HOSPITAL | Age: 70
LOS: 1 days | End: 2025-03-27
Payer: COMMERCIAL

## 2025-03-27 DIAGNOSIS — Z00.8 ENCOUNTER FOR OTHER GENERAL EXAMINATION: ICD-10-CM

## 2025-03-27 DIAGNOSIS — Z98.89 OTHER SPECIFIED POSTPROCEDURAL STATES: Chronic | ICD-10-CM

## 2025-03-27 PROCEDURE — 76870 US EXAM SCROTUM: CPT | Mod: 26

## 2025-03-27 PROCEDURE — 76870 US EXAM SCROTUM: CPT

## 2025-04-10 ENCOUNTER — NON-APPOINTMENT (OUTPATIENT)
Age: 70
End: 2025-04-10

## 2025-04-16 ENCOUNTER — NON-APPOINTMENT (OUTPATIENT)
Age: 70
End: 2025-04-16

## 2025-04-16 ENCOUNTER — APPOINTMENT (OUTPATIENT)
Dept: OTOLARYNGOLOGY | Facility: CLINIC | Age: 70
End: 2025-04-16
Payer: COMMERCIAL

## 2025-04-16 VITALS
DIASTOLIC BLOOD PRESSURE: 75 MMHG | WEIGHT: 140 LBS | HEIGHT: 66 IN | SYSTOLIC BLOOD PRESSURE: 125 MMHG | HEART RATE: 72 BPM | BODY MASS INDEX: 22.5 KG/M2

## 2025-04-16 DIAGNOSIS — J30.9 ALLERGIC RHINITIS, UNSPECIFIED: ICD-10-CM

## 2025-04-16 DIAGNOSIS — K21.9 GASTRO-ESOPHAGEAL REFLUX DISEASE W/OUT ESOPHAGITIS: ICD-10-CM

## 2025-04-16 DIAGNOSIS — H61.20 IMPACTED CERUMEN, UNSPECIFIED EAR: ICD-10-CM

## 2025-04-16 PROCEDURE — 69210 REMOVE IMPACTED EAR WAX UNI: CPT

## 2025-04-16 PROCEDURE — 99213 OFFICE O/P EST LOW 20 MIN: CPT | Mod: 25

## 2025-04-23 ENCOUNTER — APPOINTMENT (OUTPATIENT)
Dept: CARDIOLOGY | Facility: CLINIC | Age: 70
End: 2025-04-23
Payer: COMMERCIAL

## 2025-04-23 ENCOUNTER — NON-APPOINTMENT (OUTPATIENT)
Age: 70
End: 2025-04-23

## 2025-04-23 ENCOUNTER — OUTPATIENT (OUTPATIENT)
Dept: OUTPATIENT SERVICES | Facility: HOSPITAL | Age: 70
LOS: 1 days | End: 2025-04-23
Payer: COMMERCIAL

## 2025-04-23 ENCOUNTER — APPOINTMENT (OUTPATIENT)
Dept: MRI IMAGING | Facility: CLINIC | Age: 70
End: 2025-04-23
Payer: COMMERCIAL

## 2025-04-23 VITALS
SYSTOLIC BLOOD PRESSURE: 110 MMHG | HEART RATE: 76 BPM | HEIGHT: 66 IN | DIASTOLIC BLOOD PRESSURE: 67 MMHG | BODY MASS INDEX: 22.82 KG/M2 | OXYGEN SATURATION: 100 % | WEIGHT: 142 LBS

## 2025-04-23 DIAGNOSIS — I10 ESSENTIAL (PRIMARY) HYPERTENSION: ICD-10-CM

## 2025-04-23 DIAGNOSIS — Z98.89 OTHER SPECIFIED POSTPROCEDURAL STATES: Chronic | ICD-10-CM

## 2025-04-23 DIAGNOSIS — Z00.8 ENCOUNTER FOR OTHER GENERAL EXAMINATION: ICD-10-CM

## 2025-04-23 DIAGNOSIS — R20.0 ANESTHESIA OF SKIN: ICD-10-CM

## 2025-04-23 DIAGNOSIS — I25.10 ATHEROSCLEROTIC HEART DISEASE OF NATIVE CORONARY ARTERY W/OUT ANGINA PECTORIS: ICD-10-CM

## 2025-04-23 DIAGNOSIS — E78.5 HYPERLIPIDEMIA, UNSPECIFIED: ICD-10-CM

## 2025-04-23 PROCEDURE — 70551 MRI BRAIN STEM W/O DYE: CPT | Mod: 26

## 2025-04-23 PROCEDURE — 70551 MRI BRAIN STEM W/O DYE: CPT

## 2025-04-23 PROCEDURE — 99156 MOD SED OTH PHYS/QHP 5/>YRS: CPT

## 2025-04-23 PROCEDURE — 99214 OFFICE O/P EST MOD 30 MIN: CPT

## 2025-04-23 PROCEDURE — 99157 MOD SED OTHER PHYS/QHP EA: CPT

## 2025-04-23 PROCEDURE — 93000 ELECTROCARDIOGRAM COMPLETE: CPT

## 2025-04-23 PROCEDURE — G2211 COMPLEX E/M VISIT ADD ON: CPT

## 2025-05-21 NOTE — H&P PST ADULT - ENMT
What Type Of Note Output Would You Prefer (Optional)?: Standard Output Hpi Title: Evaluation of Skin Lesions details… No oral lesions; no gross abnormalities

## 2025-06-06 ENCOUNTER — EMERGENCY (EMERGENCY)
Facility: HOSPITAL | Age: 70
LOS: 1 days | End: 2025-06-06
Attending: EMERGENCY MEDICINE
Payer: OTHER MISCELLANEOUS

## 2025-06-06 VITALS
RESPIRATION RATE: 18 BRPM | WEIGHT: 136.91 LBS | HEART RATE: 88 BPM | TEMPERATURE: 99 F | DIASTOLIC BLOOD PRESSURE: 96 MMHG | SYSTOLIC BLOOD PRESSURE: 165 MMHG | OXYGEN SATURATION: 98 %

## 2025-06-06 DIAGNOSIS — Z98.89 OTHER SPECIFIED POSTPROCEDURAL STATES: Chronic | ICD-10-CM

## 2025-06-06 PROCEDURE — 99284 EMERGENCY DEPT VISIT MOD MDM: CPT | Mod: 25

## 2025-06-06 PROCEDURE — 99284 EMERGENCY DEPT VISIT MOD MDM: CPT

## 2025-06-06 PROCEDURE — 99053 MED SERV 10PM-8AM 24 HR FAC: CPT

## 2025-06-06 PROCEDURE — 71046 X-RAY EXAM CHEST 2 VIEWS: CPT

## 2025-06-06 PROCEDURE — 72100 X-RAY EXAM L-S SPINE 2/3 VWS: CPT

## 2025-06-06 PROCEDURE — 72170 X-RAY EXAM OF PELVIS: CPT

## 2025-06-06 RX ORDER — ACETAMINOPHEN 500 MG/5ML
650 LIQUID (ML) ORAL ONCE
Refills: 0 | Status: COMPLETED | OUTPATIENT
Start: 2025-06-06 | End: 2025-06-06

## 2025-06-06 RX ADMIN — Medication 650 MILLIGRAM(S): at 23:25

## 2025-06-06 NOTE — ED ADULT NURSE NOTE - NSFALLUNIVINTERV_ED_ALL_ED
Bed/Stretcher in lowest position, wheels locked, appropriate side rails in place/Call bell, personal items and telephone in reach/Instruct patient to call for assistance before getting out of bed/chair/stretcher/Non-slip footwear applied when patient is off stretcher/[place] to call system/Physically safe environment - no spills, clutter or unnecessary equipment/Purposeful proactive rounding/Room/bathroom lighting operational, light cord in reach

## 2025-06-06 NOTE — ED ADULT NURSE NOTE - OBJECTIVE STATEMENT
patient states that he fell and hit his back. patient states that he was going to sit in his chair and the chair moved and he fell on the floor. patient denies hitting his head. patient denies dizziness/lightheadedness

## 2025-06-07 PROCEDURE — 72170 X-RAY EXAM OF PELVIS: CPT | Mod: 26

## 2025-06-07 PROCEDURE — 71046 X-RAY EXAM CHEST 2 VIEWS: CPT | Mod: 26

## 2025-06-07 PROCEDURE — 72100 X-RAY EXAM L-S SPINE 2/3 VWS: CPT | Mod: 26

## 2025-06-07 NOTE — ED PROVIDER NOTE - ATTENDING APP SHARED VISIT CONTRIBUTION OF CARE
Patient with no past medical history presenting after trip and fall onto his bottom at work.  Is working in the lab downstairs.  Complaining of lumbar pain.  No point tenderness possibly paraspinal.  X-rays negative for acute fracture.  Patient to return to the follow-up with PMD.

## 2025-06-07 NOTE — ED PROVIDER NOTE - PATIENT PORTAL LINK FT
You can access the FollowMyHealth Patient Portal offered by [facility] by registering at the following website: [email or web address]. By joining [employer]’s FollowMyHealth portal, you will also be able to view your health information using other applications (apps) compatible with our system.

## 2025-06-10 NOTE — REVIEW OF SYSTEMS
Medication failed protocol.    Medication: carvedilol (COREG) 25 MG tablet   Medication Refill Protocol Failed.  order sent to provider for review  Last office visit date: 03/06/2025  Next appointment scheduled?: Yes 09/11/2025    On 05/20/2025 90 qty was sent to the local pharmacy.   This requested is for a mail order pharmacy.    [Negative] : Heme/Lymph [FreeTextEntry2] : BMI 22

## 2025-06-23 ENCOUNTER — APPOINTMENT (OUTPATIENT)
Dept: ORTHOPEDIC SURGERY | Facility: CLINIC | Age: 70
End: 2025-06-23
Payer: OTHER MISCELLANEOUS

## 2025-06-23 PROBLEM — M54.50 ACUTE MIDLINE LOW BACK PAIN WITHOUT SCIATICA: Status: ACTIVE | Noted: 2025-06-23

## 2025-06-23 PROCEDURE — 99204 OFFICE O/P NEW MOD 45 MIN: CPT

## 2025-06-23 RX ORDER — TIZANIDINE 4 MG/1
4 TABLET ORAL TWICE DAILY
Qty: 20 | Refills: 0 | Status: ACTIVE | COMMUNITY
Start: 2025-06-23 | End: 1900-01-01

## 2025-06-23 RX ORDER — IBUPROFEN 600 MG/1
600 TABLET, FILM COATED ORAL 3 TIMES DAILY
Qty: 90 | Refills: 2 | Status: ACTIVE | COMMUNITY
Start: 2025-06-23 | End: 1900-01-01

## 2025-07-17 ENCOUNTER — APPOINTMENT (OUTPATIENT)
Dept: OTOLARYNGOLOGY | Facility: CLINIC | Age: 70
End: 2025-07-17
Payer: COMMERCIAL

## 2025-07-17 VITALS
WEIGHT: 140 LBS | HEART RATE: 76 BPM | BODY MASS INDEX: 22.5 KG/M2 | DIASTOLIC BLOOD PRESSURE: 61 MMHG | HEIGHT: 66 IN | SYSTOLIC BLOOD PRESSURE: 97 MMHG

## 2025-07-17 PROBLEM — H69.90 EUSTACHIAN TUBE DYSFUNCTION: Status: ACTIVE | Noted: 2025-07-17

## 2025-07-17 PROCEDURE — 99213 OFFICE O/P EST LOW 20 MIN: CPT

## 2025-07-17 PROCEDURE — 92567 TYMPANOMETRY: CPT

## 2025-08-04 ENCOUNTER — APPOINTMENT (OUTPATIENT)
Dept: ORTHOPEDIC SURGERY | Facility: CLINIC | Age: 70
End: 2025-08-04
Payer: OTHER MISCELLANEOUS

## 2025-08-04 DIAGNOSIS — M54.9 DORSALGIA, UNSPECIFIED: ICD-10-CM

## 2025-08-04 PROCEDURE — 99213 OFFICE O/P EST LOW 20 MIN: CPT

## 2025-08-08 DIAGNOSIS — I25.10 ATHEROSCLEROTIC HEART DISEASE OF NATIVE CORONARY ARTERY W/OUT ANGINA PECTORIS: ICD-10-CM

## 2025-08-08 DIAGNOSIS — E11.9 TYPE 2 DIABETES MELLITUS W/OUT COMPLICATIONS: ICD-10-CM

## 2025-08-08 DIAGNOSIS — I10 ESSENTIAL (PRIMARY) HYPERTENSION: ICD-10-CM

## 2025-08-08 DIAGNOSIS — K21.9 GASTRO-ESOPHAGEAL REFLUX DISEASE W/OUT ESOPHAGITIS: ICD-10-CM

## 2025-08-08 DIAGNOSIS — E78.5 HYPERLIPIDEMIA, UNSPECIFIED: ICD-10-CM

## 2025-08-11 ENCOUNTER — APPOINTMENT (OUTPATIENT)
Dept: RADIOLOGY | Facility: CLINIC | Age: 70
End: 2025-08-11
Payer: COMMERCIAL

## 2025-08-11 ENCOUNTER — OUTPATIENT (OUTPATIENT)
Dept: OUTPATIENT SERVICES | Facility: HOSPITAL | Age: 70
LOS: 1 days | End: 2025-08-11
Payer: COMMERCIAL

## 2025-08-11 ENCOUNTER — APPOINTMENT (OUTPATIENT)
Age: 70
End: 2025-08-11
Payer: COMMERCIAL

## 2025-08-11 VITALS
TEMPERATURE: 98.2 F | WEIGHT: 137 LBS | OXYGEN SATURATION: 98 % | BODY MASS INDEX: 22.02 KG/M2 | HEIGHT: 66 IN | HEART RATE: 80 BPM

## 2025-08-11 DIAGNOSIS — R07.81 PLEURODYNIA: ICD-10-CM

## 2025-08-11 DIAGNOSIS — Z98.89 OTHER SPECIFIED POSTPROCEDURAL STATES: Chronic | ICD-10-CM

## 2025-08-11 PROCEDURE — G2211 COMPLEX E/M VISIT ADD ON: CPT

## 2025-08-11 PROCEDURE — 99213 OFFICE O/P EST LOW 20 MIN: CPT

## 2025-08-11 PROCEDURE — 71100 X-RAY EXAM RIBS UNI 2 VIEWS: CPT

## 2025-08-11 PROCEDURE — 71100 X-RAY EXAM RIBS UNI 2 VIEWS: CPT | Mod: 26,LT

## 2025-08-11 RX ORDER — DICLOFENAC SODIUM 75 MG/1
75 TABLET, DELAYED RELEASE ORAL
Qty: 20 | Refills: 2 | Status: ACTIVE | COMMUNITY
Start: 2025-08-11 | End: 1900-01-01

## 2025-08-11 RX ORDER — LIDOCAINE 5% 700 MG/1
5 PATCH TOPICAL
Qty: 20 | Refills: 7 | Status: ACTIVE | COMMUNITY
Start: 2025-08-11 | End: 1900-01-01

## 2025-08-18 LAB
ALBUMIN SERPL ELPH-MCNC: 4.4 G/DL
ALBUMIN, RANDOM URINE: <1.2 MG/DL
ALP BLD-CCNC: 74 U/L
ALT SERPL-CCNC: 21 U/L
ANION GAP SERPL CALC-SCNC: 12 MMOL/L
AST SERPL-CCNC: 18 U/L
BASOPHILS # BLD AUTO: 0.02 K/UL
BASOPHILS NFR BLD AUTO: 0.3 %
BILIRUB SERPL-MCNC: 0.7 MG/DL
BUN SERPL-MCNC: 19 MG/DL
CALCIUM SERPL-MCNC: 9.3 MG/DL
CHLORIDE SERPL-SCNC: 102 MMOL/L
CHOLEST SERPL-MCNC: 136 MG/DL
CO2 SERPL-SCNC: 25 MMOL/L
CREAT SERPL-MCNC: 0.91 MG/DL
CREAT SPEC-SCNC: 97 MG/DL
EGFRCR SERPLBLD CKD-EPI 2021: 92 ML/MIN/1.73M2
EOSINOPHIL # BLD AUTO: 0.24 K/UL
EOSINOPHIL NFR BLD AUTO: 3.9 %
ESTIMATED AVERAGE GLUCOSE: 154 MG/DL
GLUCOSE SERPL-MCNC: 130 MG/DL
HBA1C MFR BLD HPLC: 7 %
HCT VFR BLD CALC: 42 %
HDLC SERPL-MCNC: 45 MG/DL
HGB BLD-MCNC: 13.4 G/DL
IMM GRANULOCYTES NFR BLD AUTO: 0.3 %
LDLC SERPL-MCNC: 75 MG/DL
LYMPHOCYTES # BLD AUTO: 1.49 K/UL
LYMPHOCYTES NFR BLD AUTO: 24.2 %
MAN DIFF?: NORMAL
MCHC RBC-ENTMCNC: 29.7 PG
MCHC RBC-ENTMCNC: 31.9 G/DL
MCV RBC AUTO: 93.1 FL
MICROALBUMIN/CREAT 24H UR-RTO: NORMAL MG/G
MONOCYTES # BLD AUTO: 0.5 K/UL
MONOCYTES NFR BLD AUTO: 8.1 %
NEUTROPHILS # BLD AUTO: 3.89 K/UL
NEUTROPHILS NFR BLD AUTO: 63.2 %
NONHDLC SERPL-MCNC: 91 MG/DL
PLATELET # BLD AUTO: 239 K/UL
POTASSIUM SERPL-SCNC: 4.3 MMOL/L
PROT SERPL-MCNC: 6.8 G/DL
RBC # BLD: 4.51 M/UL
RBC # FLD: 12.3 %
SODIUM SERPL-SCNC: 139 MMOL/L
TRIGL SERPL-MCNC: 86 MG/DL
TSH SERPL-ACNC: 1.49 UIU/ML
WBC # FLD AUTO: 6.16 K/UL

## 2025-08-25 ENCOUNTER — APPOINTMENT (OUTPATIENT)
Dept: ENDOCRINOLOGY | Facility: CLINIC | Age: 70
End: 2025-08-25
Payer: COMMERCIAL

## 2025-08-25 VITALS
HEART RATE: 81 BPM | HEIGHT: 66 IN | DIASTOLIC BLOOD PRESSURE: 60 MMHG | BODY MASS INDEX: 22.5 KG/M2 | SYSTOLIC BLOOD PRESSURE: 120 MMHG | WEIGHT: 140 LBS | OXYGEN SATURATION: 98 %

## 2025-08-25 DIAGNOSIS — E11.9 TYPE 2 DIABETES MELLITUS W/OUT COMPLICATIONS: ICD-10-CM

## 2025-08-25 DIAGNOSIS — E78.5 HYPERLIPIDEMIA, UNSPECIFIED: ICD-10-CM

## 2025-08-25 PROCEDURE — 99214 OFFICE O/P EST MOD 30 MIN: CPT

## 2025-08-25 PROCEDURE — G2211 COMPLEX E/M VISIT ADD ON: CPT

## (undated) DEVICE — CATH IV SAFE BC 22G X 1" (BLUE)

## (undated) DEVICE — PACK IV START WITH CHG

## (undated) DEVICE — TUBING SUCTION 20FT

## (undated) DEVICE — SUCTION YANKAUER NO CONTROL VENT

## (undated) DEVICE — SENSOR O2 FINGER ADULT

## (undated) DEVICE — CLAMP BX HOT RAD JAW 3

## (undated) DEVICE — TUBING CAP SET ENDO 24HR USE GI

## (undated) DEVICE — TUBING IV SET GRAVITY 3Y 100" MACRO

## (undated) DEVICE — SYR ALLIANCE II INFLATION 60ML

## (undated) DEVICE — BIOPSY FORCEP RADIAL JAW 4 STANDARD WITH NEEDLE

## (undated) DEVICE — FOLEY HOLDER STATLOCK 2 WAY ADULT

## (undated) DEVICE — ELCTR GROUNDING PAD ADULT COVIDIEN

## (undated) DEVICE — BRUSH COLONOSCOPY CYTOLOGY

## (undated) DEVICE — IRRIGATOR BIO SHIELD

## (undated) DEVICE — TUBING SUCTION CONN 6FT STERILE

## (undated) DEVICE — BITE BLOCK ADULT 20 X 27MM (GREEN)

## (undated) DEVICE — BALLOON US ENDO

## (undated) DEVICE — CATH IV SAFE BC 20G X 1.16" (PINK)

## (undated) DEVICE — SYR LUER LOK 50CC

## (undated) DEVICE — POLY TRAP ETRAP

## (undated) DEVICE — SOL INJ NS 0.9% 500ML 2 PORT

## (undated) DEVICE — Device

## (undated) DEVICE — FORCEP RADIAL JAW 4 JUMBO 2.8MM 3.2MM 240CM ORANGE DISP